# Patient Record
Sex: FEMALE | Race: WHITE | Employment: OTHER | URBAN - NONMETROPOLITAN AREA
[De-identification: names, ages, dates, MRNs, and addresses within clinical notes are randomized per-mention and may not be internally consistent; named-entity substitution may affect disease eponyms.]

---

## 2017-01-03 ENCOUNTER — PATIENT MESSAGE (OUTPATIENT)
Dept: FAMILY MEDICINE CLINIC | Age: 67
End: 2017-01-03

## 2017-01-03 RX ORDER — CLINDAMYCIN HYDROCHLORIDE 300 MG/1
300 CAPSULE ORAL 3 TIMES DAILY
Qty: 30 CAPSULE | Refills: 0 | Status: SHIPPED | OUTPATIENT
Start: 2017-01-03 | End: 2017-01-03 | Stop reason: SDUPTHER

## 2017-01-03 RX ORDER — CLINDAMYCIN HYDROCHLORIDE 300 MG/1
300 CAPSULE ORAL 3 TIMES DAILY
Qty: 30 CAPSULE | Refills: 0 | Status: SHIPPED | OUTPATIENT
Start: 2017-01-03 | End: 2017-01-13

## 2017-01-17 RX ORDER — POTASSIUM CHLORIDE 20 MEQ/1
TABLET, EXTENDED RELEASE ORAL
Qty: 540 TABLET | Refills: 1 | Status: SHIPPED | OUTPATIENT
Start: 2017-01-17 | End: 2017-08-25 | Stop reason: SDUPTHER

## 2017-01-23 RX ORDER — TRIAMTERENE AND HYDROCHLOROTHIAZIDE 37.5; 25 MG/1; MG/1
1 CAPSULE ORAL EVERY MORNING
Qty: 90 CAPSULE | Refills: 1 | Status: SHIPPED | OUTPATIENT
Start: 2017-01-23 | End: 2017-08-25 | Stop reason: SDUPTHER

## 2017-02-07 ENCOUNTER — PATIENT MESSAGE (OUTPATIENT)
Dept: FAMILY MEDICINE CLINIC | Age: 67
End: 2017-02-07

## 2017-02-07 DIAGNOSIS — M54.6 ACUTE THORACIC BACK PAIN, UNSPECIFIED BACK PAIN LATERALITY: ICD-10-CM

## 2017-02-07 DIAGNOSIS — M54.5 ACUTE LOW BACK PAIN, UNSPECIFIED BACK PAIN LATERALITY, WITH SCIATICA PRESENCE UNSPECIFIED: Primary | ICD-10-CM

## 2017-02-10 RX ORDER — ALENDRONATE SODIUM 70 MG/1
TABLET ORAL
Qty: 12 TABLET | Refills: 1 | Status: SHIPPED | OUTPATIENT
Start: 2017-02-10 | End: 2017-05-30

## 2017-03-30 ENCOUNTER — ANTI-COAG VISIT (OUTPATIENT)
Dept: FAMILY MEDICINE CLINIC | Age: 67
End: 2017-03-30

## 2017-03-30 DIAGNOSIS — I48.0 PAROXYSMAL ATRIAL FIBRILLATION (HCC): Primary | ICD-10-CM

## 2017-03-30 DIAGNOSIS — I48.20 CHRONIC ATRIAL FIBRILLATION (HCC): ICD-10-CM

## 2017-04-04 RX ORDER — PROPAFENONE HYDROCHLORIDE 150 MG/1
TABLET, FILM COATED ORAL
Qty: 90 TABLET | Refills: 1 | Status: SHIPPED | OUTPATIENT
Start: 2017-04-04 | End: 2018-06-04 | Stop reason: DRUGHIGH

## 2017-04-04 RX ORDER — ALPRAZOLAM 0.25 MG/1
0.25 TABLET ORAL EVERY 8 HOURS PRN
Qty: 270 TABLET | Refills: 0 | Status: SHIPPED | OUTPATIENT
Start: 2017-04-04 | End: 2017-08-25 | Stop reason: SDUPTHER

## 2017-04-04 RX ORDER — FUROSEMIDE 20 MG/1
20 TABLET ORAL DAILY
Qty: 90 TABLET | Refills: 1 | Status: SHIPPED | OUTPATIENT
Start: 2017-04-04 | End: 2017-08-25 | Stop reason: SDUPTHER

## 2017-04-05 LAB
INR BLD: 3.2
PROTIME: NORMAL SECONDS

## 2017-04-18 ENCOUNTER — ANTI-COAG VISIT (OUTPATIENT)
Dept: FAMILY MEDICINE CLINIC | Age: 67
End: 2017-04-18

## 2017-05-25 ENCOUNTER — ANTI-COAG VISIT (OUTPATIENT)
Dept: FAMILY MEDICINE CLINIC | Age: 67
End: 2017-05-25

## 2017-05-30 ENCOUNTER — OFFICE VISIT (OUTPATIENT)
Dept: FAMILY MEDICINE CLINIC | Age: 67
End: 2017-05-30
Payer: MEDICARE

## 2017-05-30 VITALS
HEART RATE: 64 BPM | HEIGHT: 63 IN | BODY MASS INDEX: 20.38 KG/M2 | SYSTOLIC BLOOD PRESSURE: 120 MMHG | WEIGHT: 115 LBS | RESPIRATION RATE: 16 BRPM | DIASTOLIC BLOOD PRESSURE: 60 MMHG

## 2017-05-30 DIAGNOSIS — R73.01 IMPAIRED FASTING GLUCOSE: ICD-10-CM

## 2017-05-30 DIAGNOSIS — E87.8 HYPOCHLOREMIA: ICD-10-CM

## 2017-05-30 DIAGNOSIS — M54.6 CHRONIC MIDLINE THORACIC BACK PAIN: ICD-10-CM

## 2017-05-30 DIAGNOSIS — E78.2 MIXED HYPERLIPIDEMIA: Primary | ICD-10-CM

## 2017-05-30 DIAGNOSIS — I10 ESSENTIAL HYPERTENSION: ICD-10-CM

## 2017-05-30 DIAGNOSIS — E87.6 HYPOKALEMIA: ICD-10-CM

## 2017-05-30 DIAGNOSIS — G89.29 CHRONIC MIDLINE THORACIC BACK PAIN: ICD-10-CM

## 2017-05-30 DIAGNOSIS — Z12.31 ENCOUNTER FOR SCREENING MAMMOGRAM FOR BREAST CANCER: ICD-10-CM

## 2017-05-30 DIAGNOSIS — Z12.11 SCREENING FOR COLON CANCER: ICD-10-CM

## 2017-05-30 DIAGNOSIS — I48.0 PAROXYSMAL ATRIAL FIBRILLATION (HCC): ICD-10-CM

## 2017-05-30 DIAGNOSIS — E83.42 HYPOMAGNESEMIA: ICD-10-CM

## 2017-05-30 PROCEDURE — 1090F PRES/ABSN URINE INCON ASSESS: CPT | Performed by: FAMILY MEDICINE

## 2017-05-30 PROCEDURE — 3017F COLORECTAL CA SCREEN DOC REV: CPT | Performed by: FAMILY MEDICINE

## 2017-05-30 PROCEDURE — 1123F ACP DISCUSS/DSCN MKR DOCD: CPT | Performed by: FAMILY MEDICINE

## 2017-05-30 PROCEDURE — 1036F TOBACCO NON-USER: CPT | Performed by: FAMILY MEDICINE

## 2017-05-30 PROCEDURE — 4040F PNEUMOC VAC/ADMIN/RCVD: CPT | Performed by: FAMILY MEDICINE

## 2017-05-30 PROCEDURE — 3014F SCREEN MAMMO DOC REV: CPT | Performed by: FAMILY MEDICINE

## 2017-05-30 PROCEDURE — 99214 OFFICE O/P EST MOD 30 MIN: CPT | Performed by: FAMILY MEDICINE

## 2017-05-30 PROCEDURE — G8419 CALC BMI OUT NRM PARAM NOF/U: HCPCS | Performed by: FAMILY MEDICINE

## 2017-05-30 PROCEDURE — G8427 DOCREV CUR MEDS BY ELIG CLIN: HCPCS | Performed by: FAMILY MEDICINE

## 2017-05-30 PROCEDURE — G8399 PT W/DXA RESULTS DOCUMENT: HCPCS | Performed by: FAMILY MEDICINE

## 2017-05-30 RX ORDER — ACETAMINOPHEN AND CODEINE PHOSPHATE 300; 15 MG/1; MG/1
1 TABLET ORAL EVERY 6 HOURS PRN
Qty: 30 TABLET | Refills: 0 | Status: SHIPPED | OUTPATIENT
Start: 2017-05-30 | End: 2017-12-01

## 2017-05-30 RX ORDER — DILTIAZEM HYDROCHLORIDE 240 MG/1
240 CAPSULE, COATED, EXTENDED RELEASE ORAL 2 TIMES DAILY
COMMUNITY
End: 2017-12-01 | Stop reason: SDUPTHER

## 2017-05-30 ASSESSMENT — ENCOUNTER SYMPTOMS
EYE DISCHARGE: 0
DIARRHEA: 0
VOMITING: 0
NAUSEA: 0
SINUS PRESSURE: 0
SORE THROAT: 0
SHORTNESS OF BREATH: 0
COUGH: 0
ABDOMINAL PAIN: 0
RHINORRHEA: 0
WHEEZING: 0
EYE REDNESS: 0
TROUBLE SWALLOWING: 0
CONSTIPATION: 0
BACK PAIN: 1

## 2017-05-30 ASSESSMENT — PATIENT HEALTH QUESTIONNAIRE - PHQ9
2. FEELING DOWN, DEPRESSED OR HOPELESS: 1
SUM OF ALL RESPONSES TO PHQ9 QUESTIONS 1 & 2: 1
1. LITTLE INTEREST OR PLEASURE IN DOING THINGS: 0
SUM OF ALL RESPONSES TO PHQ QUESTIONS 1-9: 1

## 2017-06-15 RX ORDER — DILTIAZEM HYDROCHLORIDE 60 MG/1
TABLET, FILM COATED ORAL
Qty: 270 TABLET | Refills: 1 | Status: SHIPPED | OUTPATIENT
Start: 2017-06-15 | End: 2018-07-17 | Stop reason: SDUPTHER

## 2017-07-20 LAB
INR BLD: NORMAL
PROTIME: 3 SECONDS

## 2017-07-27 LAB
INR BLD: 2.9
PROTIME: NORMAL SECONDS

## 2017-08-02 ENCOUNTER — TELEPHONE (OUTPATIENT)
Dept: PRIMARY CARE CLINIC | Age: 67
End: 2017-08-02

## 2017-08-25 RX ORDER — POTASSIUM CHLORIDE 20 MEQ/1
TABLET, EXTENDED RELEASE ORAL
Qty: 540 TABLET | Refills: 1 | Status: SHIPPED | OUTPATIENT
Start: 2017-08-25 | End: 2017-12-01 | Stop reason: SDUPTHER

## 2017-08-25 RX ORDER — WARFARIN SODIUM 1 MG/1
TABLET ORAL
Qty: 270 TABLET | Refills: 3 | Status: SHIPPED | OUTPATIENT
Start: 2017-08-25 | End: 2017-12-01 | Stop reason: SDUPTHER

## 2017-08-25 RX ORDER — ALPRAZOLAM 0.25 MG/1
0.25 TABLET ORAL EVERY 8 HOURS PRN
Qty: 270 TABLET | Refills: 0 | Status: SHIPPED | OUTPATIENT
Start: 2017-08-25 | End: 2017-12-01 | Stop reason: SDUPTHER

## 2017-08-25 RX ORDER — TRIAMTERENE AND HYDROCHLOROTHIAZIDE 37.5; 25 MG/1; MG/1
CAPSULE ORAL
Qty: 90 CAPSULE | Refills: 1 | Status: SHIPPED | OUTPATIENT
Start: 2017-08-25 | End: 2017-12-01 | Stop reason: SDUPTHER

## 2017-08-25 RX ORDER — FUROSEMIDE 20 MG/1
TABLET ORAL
Qty: 90 TABLET | Refills: 1 | Status: SHIPPED | OUTPATIENT
Start: 2017-08-25 | End: 2017-12-01 | Stop reason: SDUPTHER

## 2017-08-26 ENCOUNTER — OFFICE VISIT (OUTPATIENT)
Dept: PRIMARY CARE CLINIC | Age: 67
End: 2017-08-26
Payer: MEDICARE

## 2017-08-26 ENCOUNTER — HOSPITAL ENCOUNTER (OUTPATIENT)
Dept: GENERAL RADIOLOGY | Age: 67
Discharge: HOME | End: 2017-08-26
Payer: MEDICARE

## 2017-08-26 ENCOUNTER — HOSPITAL ENCOUNTER (OUTPATIENT)
Age: 67
Discharge: HOME | End: 2017-08-26
Payer: MEDICARE

## 2017-08-26 VITALS
OXYGEN SATURATION: 97 % | WEIGHT: 115.8 LBS | BODY MASS INDEX: 20.52 KG/M2 | HEART RATE: 65 BPM | DIASTOLIC BLOOD PRESSURE: 72 MMHG | RESPIRATION RATE: 14 BRPM | SYSTOLIC BLOOD PRESSURE: 134 MMHG | TEMPERATURE: 97 F | HEIGHT: 63 IN

## 2017-08-26 DIAGNOSIS — M79.671 RIGHT FOOT PAIN: Primary | ICD-10-CM

## 2017-08-26 DIAGNOSIS — M79.671 RIGHT FOOT PAIN: ICD-10-CM

## 2017-08-26 PROCEDURE — G8399 PT W/DXA RESULTS DOCUMENT: HCPCS | Performed by: FAMILY MEDICINE

## 2017-08-26 PROCEDURE — 1036F TOBACCO NON-USER: CPT | Performed by: FAMILY MEDICINE

## 2017-08-26 PROCEDURE — G8420 CALC BMI NORM PARAMETERS: HCPCS | Performed by: FAMILY MEDICINE

## 2017-08-26 PROCEDURE — 4040F PNEUMOC VAC/ADMIN/RCVD: CPT | Performed by: FAMILY MEDICINE

## 2017-08-26 PROCEDURE — G8427 DOCREV CUR MEDS BY ELIG CLIN: HCPCS | Performed by: FAMILY MEDICINE

## 2017-08-26 PROCEDURE — 99213 OFFICE O/P EST LOW 20 MIN: CPT | Performed by: FAMILY MEDICINE

## 2017-08-26 PROCEDURE — 73630 X-RAY EXAM OF FOOT: CPT

## 2017-08-26 PROCEDURE — 3017F COLORECTAL CA SCREEN DOC REV: CPT | Performed by: FAMILY MEDICINE

## 2017-08-26 PROCEDURE — 1123F ACP DISCUSS/DSCN MKR DOCD: CPT | Performed by: FAMILY MEDICINE

## 2017-08-26 PROCEDURE — 3014F SCREEN MAMMO DOC REV: CPT | Performed by: FAMILY MEDICINE

## 2017-08-26 PROCEDURE — 1090F PRES/ABSN URINE INCON ASSESS: CPT | Performed by: FAMILY MEDICINE

## 2017-08-26 ASSESSMENT — ENCOUNTER SYMPTOMS
RESPIRATORY NEGATIVE: 1
EYES NEGATIVE: 1
GASTROINTESTINAL NEGATIVE: 1
ALLERGIC/IMMUNOLOGIC NEGATIVE: 1

## 2017-08-31 LAB
INR BLD: 3
PROTIME: NORMAL SECONDS

## 2017-10-12 LAB
INR BLD: 3.5
PROTIME: NORMAL SECONDS

## 2017-10-19 ENCOUNTER — PATIENT MESSAGE (OUTPATIENT)
Dept: FAMILY MEDICINE CLINIC | Age: 67
End: 2017-10-19

## 2017-10-19 LAB
INR BLD: 3.2
PROTIME: NORMAL SECONDS

## 2017-10-20 ENCOUNTER — PATIENT MESSAGE (OUTPATIENT)
Dept: FAMILY MEDICINE CLINIC | Age: 67
End: 2017-10-20

## 2017-10-20 RX ORDER — VALACYCLOVIR HYDROCHLORIDE 1 G/1
TABLET, FILM COATED ORAL
Qty: 4 TABLET | Refills: 2 | Status: SHIPPED | OUTPATIENT
Start: 2017-10-20 | End: 2018-03-02 | Stop reason: ALTCHOICE

## 2017-10-20 NOTE — TELEPHONE ENCOUNTER
Per Epic medication history patient was previously on Valtrex 1 G 2 tab BID for 1 day, last fill 10/7/15. Please advise.

## 2017-11-02 LAB
INR BLD: NORMAL
PROTIME: 3.4 SECONDS

## 2017-11-17 ENCOUNTER — HOSPITAL ENCOUNTER (OUTPATIENT)
Dept: LAB | Age: 67
Setting detail: SPECIMEN
Discharge: HOME | End: 2017-11-17
Payer: MEDICARE

## 2017-11-17 DIAGNOSIS — E83.42 HYPOMAGNESEMIA: ICD-10-CM

## 2017-11-17 DIAGNOSIS — I10 ESSENTIAL HYPERTENSION: ICD-10-CM

## 2017-11-17 DIAGNOSIS — E78.2 MIXED HYPERLIPIDEMIA: ICD-10-CM

## 2017-11-17 DIAGNOSIS — R73.01 IMPAIRED FASTING GLUCOSE: ICD-10-CM

## 2017-11-17 LAB
ABSOLUTE EOS #: 0.2 K/UL (ref 0–0.4)
ABSOLUTE IMMATURE GRANULOCYTE: NORMAL K/UL (ref 0–0.3)
ABSOLUTE LYMPH #: 1.9 K/UL (ref 1–4.8)
ABSOLUTE MONO #: 0.7 K/UL (ref 0.1–1.2)
ALBUMIN SERPL-MCNC: 4.5 G/DL (ref 3.5–5.2)
ALBUMIN/GLOBULIN RATIO: 1.5 (ref 1–2.5)
ALP BLD-CCNC: 81 U/L (ref 35–104)
ALT SERPL-CCNC: 41 U/L (ref 5–33)
ANION GAP SERPL CALCULATED.3IONS-SCNC: 13 MMOL/L (ref 9–17)
AST SERPL-CCNC: 24 U/L
BASOPHILS # BLD: 1 % (ref 0–1)
BASOPHILS ABSOLUTE: 0.1 K/UL (ref 0–0.2)
BILIRUB SERPL-MCNC: 0.42 MG/DL (ref 0.3–1.2)
BUN BLDV-MCNC: 15 MG/DL (ref 8–23)
BUN/CREAT BLD: 20 (ref 9–20)
CALCIUM SERPL-MCNC: 9.5 MG/DL (ref 8.6–10.4)
CHLORIDE BLD-SCNC: 99 MMOL/L (ref 98–107)
CHOLESTEROL/HDL RATIO: 4.2
CHOLESTEROL: 216 MG/DL
CO2: 29 MMOL/L (ref 20–31)
CREAT SERPL-MCNC: 0.74 MG/DL (ref 0.5–0.9)
DIFFERENTIAL TYPE: NORMAL
EOSINOPHILS RELATIVE PERCENT: 3 % (ref 1–7)
GFR AFRICAN AMERICAN: >60 ML/MIN
GFR NON-AFRICAN AMERICAN: >60 ML/MIN
GFR SERPL CREATININE-BSD FRML MDRD: ABNORMAL ML/MIN/{1.73_M2}
GFR SERPL CREATININE-BSD FRML MDRD: ABNORMAL ML/MIN/{1.73_M2}
GLUCOSE BLD-MCNC: 97 MG/DL (ref 70–99)
HCT VFR BLD CALC: 38.7 % (ref 36–46)
HDLC SERPL-MCNC: 51 MG/DL
HEMOGLOBIN: 13 G/DL (ref 12–16)
IMMATURE GRANULOCYTES: NORMAL %
LDL CHOLESTEROL: 138 MG/DL (ref 0–130)
LYMPHOCYTES # BLD: 21 % (ref 16–46)
MAGNESIUM: 2.2 MG/DL (ref 1.6–2.6)
MCH RBC QN AUTO: 31.3 PG (ref 26–34)
MCHC RBC AUTO-ENTMCNC: 33.7 G/DL (ref 31–37)
MCV RBC AUTO: 92.9 FL (ref 80–100)
MONOCYTES # BLD: 8 % (ref 4–11)
PDW BLD-RTO: 12.1 % (ref 11–14.5)
PLATELET # BLD: 333 K/UL (ref 140–450)
PLATELET ESTIMATE: NORMAL
PMV BLD AUTO: 7.5 FL (ref 6–12)
POTASSIUM SERPL-SCNC: 4.6 MMOL/L (ref 3.7–5.3)
RBC # BLD: 4.16 M/UL (ref 4–5.2)
RBC # BLD: NORMAL 10*6/UL
SEG NEUTROPHILS: 67 % (ref 43–77)
SEGMENTED NEUTROPHILS ABSOLUTE COUNT: 6 K/UL (ref 1.8–7.7)
SODIUM BLD-SCNC: 141 MMOL/L (ref 135–144)
TOTAL PROTEIN: 7.6 G/DL (ref 6.4–8.3)
TRIGL SERPL-MCNC: 135 MG/DL
TSH SERPL DL<=0.05 MIU/L-ACNC: 2.47 MIU/L (ref 0.3–5)
VLDLC SERPL CALC-MCNC: ABNORMAL MG/DL (ref 1–30)
WBC # BLD: 8.9 K/UL (ref 3.5–11)
WBC # BLD: NORMAL 10*3/UL

## 2017-11-17 PROCEDURE — 83735 ASSAY OF MAGNESIUM: CPT

## 2017-11-17 PROCEDURE — 80061 LIPID PANEL: CPT

## 2017-11-17 PROCEDURE — 80053 COMPREHEN METABOLIC PANEL: CPT

## 2017-11-17 PROCEDURE — 84443 ASSAY THYROID STIM HORMONE: CPT

## 2017-11-17 PROCEDURE — 36415 COLL VENOUS BLD VENIPUNCTURE: CPT

## 2017-11-17 PROCEDURE — 85025 COMPLETE CBC W/AUTO DIFF WBC: CPT

## 2017-11-24 LAB
INR BLD: NORMAL
PROTIME: 3.5 SECONDS

## 2017-12-01 ENCOUNTER — OFFICE VISIT (OUTPATIENT)
Dept: FAMILY MEDICINE CLINIC | Age: 67
End: 2017-12-01
Payer: MEDICARE

## 2017-12-01 VITALS
WEIGHT: 120 LBS | BODY MASS INDEX: 21.26 KG/M2 | RESPIRATION RATE: 16 BRPM | HEART RATE: 64 BPM | DIASTOLIC BLOOD PRESSURE: 70 MMHG | HEIGHT: 63 IN | SYSTOLIC BLOOD PRESSURE: 120 MMHG | OXYGEN SATURATION: 97 %

## 2017-12-01 DIAGNOSIS — I10 ESSENTIAL HYPERTENSION: ICD-10-CM

## 2017-12-01 DIAGNOSIS — E78.2 MIXED HYPERLIPIDEMIA: ICD-10-CM

## 2017-12-01 DIAGNOSIS — R79.89 ABNORMAL LFTS: ICD-10-CM

## 2017-12-01 DIAGNOSIS — Z00.00 MEDICARE ANNUAL WELLNESS VISIT, INITIAL: ICD-10-CM

## 2017-12-01 DIAGNOSIS — E87.6 HYPOKALEMIA: ICD-10-CM

## 2017-12-01 DIAGNOSIS — I48.0 PAROXYSMAL ATRIAL FIBRILLATION (HCC): ICD-10-CM

## 2017-12-01 DIAGNOSIS — R73.01 IMPAIRED FASTING GLUCOSE: Primary | ICD-10-CM

## 2017-12-01 DIAGNOSIS — E83.42 HYPOMAGNESEMIA: ICD-10-CM

## 2017-12-01 PROCEDURE — 99214 OFFICE O/P EST MOD 30 MIN: CPT | Performed by: FAMILY MEDICINE

## 2017-12-01 PROCEDURE — 1036F TOBACCO NON-USER: CPT | Performed by: FAMILY MEDICINE

## 2017-12-01 PROCEDURE — 1090F PRES/ABSN URINE INCON ASSESS: CPT | Performed by: FAMILY MEDICINE

## 2017-12-01 PROCEDURE — 3014F SCREEN MAMMO DOC REV: CPT | Performed by: FAMILY MEDICINE

## 2017-12-01 PROCEDURE — G8484 FLU IMMUNIZE NO ADMIN: HCPCS | Performed by: FAMILY MEDICINE

## 2017-12-01 PROCEDURE — 4040F PNEUMOC VAC/ADMIN/RCVD: CPT | Performed by: FAMILY MEDICINE

## 2017-12-01 PROCEDURE — G0438 PPPS, INITIAL VISIT: HCPCS | Performed by: FAMILY MEDICINE

## 2017-12-01 PROCEDURE — G8399 PT W/DXA RESULTS DOCUMENT: HCPCS | Performed by: FAMILY MEDICINE

## 2017-12-01 PROCEDURE — G8427 DOCREV CUR MEDS BY ELIG CLIN: HCPCS | Performed by: FAMILY MEDICINE

## 2017-12-01 PROCEDURE — 1123F ACP DISCUSS/DSCN MKR DOCD: CPT | Performed by: FAMILY MEDICINE

## 2017-12-01 PROCEDURE — 3017F COLORECTAL CA SCREEN DOC REV: CPT | Performed by: FAMILY MEDICINE

## 2017-12-01 PROCEDURE — G8420 CALC BMI NORM PARAMETERS: HCPCS | Performed by: FAMILY MEDICINE

## 2017-12-01 RX ORDER — DILTIAZEM HYDROCHLORIDE 240 MG/1
240 CAPSULE, COATED, EXTENDED RELEASE ORAL 2 TIMES DAILY
Qty: 180 CAPSULE | Refills: 1 | Status: SHIPPED | OUTPATIENT
Start: 2017-12-01 | End: 2018-04-25 | Stop reason: DRUGHIGH

## 2017-12-01 RX ORDER — WARFARIN SODIUM 5 MG/1
TABLET ORAL
Qty: 100 TABLET | Refills: 3 | Status: SHIPPED | OUTPATIENT
Start: 2017-12-01 | End: 2018-11-18 | Stop reason: SDUPTHER

## 2017-12-01 RX ORDER — FUROSEMIDE 20 MG/1
TABLET ORAL
Qty: 90 TABLET | Refills: 1 | Status: SHIPPED | OUTPATIENT
Start: 2017-12-01 | End: 2018-12-06 | Stop reason: ALTCHOICE

## 2017-12-01 RX ORDER — TRIAMTERENE AND HYDROCHLOROTHIAZIDE 37.5; 25 MG/1; MG/1
CAPSULE ORAL
Qty: 90 CAPSULE | Refills: 1 | Status: SHIPPED | OUTPATIENT
Start: 2017-12-01 | End: 2018-01-18

## 2017-12-01 RX ORDER — POTASSIUM CHLORIDE 20 MEQ/1
TABLET, EXTENDED RELEASE ORAL
Qty: 540 TABLET | Refills: 1 | Status: SHIPPED | OUTPATIENT
Start: 2017-12-01 | End: 2018-12-06 | Stop reason: ALTCHOICE

## 2017-12-01 RX ORDER — WARFARIN SODIUM 1 MG/1
TABLET ORAL
Qty: 270 TABLET | Refills: 3 | Status: SHIPPED | OUTPATIENT
Start: 2017-12-01 | End: 2019-03-28 | Stop reason: SDUPTHER

## 2017-12-01 RX ORDER — METRONIDAZOLE 10 MG/G
GEL TOPICAL
Refills: 2 | COMMUNITY
Start: 2017-10-03 | End: 2018-12-06

## 2017-12-01 RX ORDER — ALPRAZOLAM 0.25 MG/1
0.25 TABLET ORAL EVERY 8 HOURS PRN
Qty: 270 TABLET | Refills: 0 | Status: SHIPPED | OUTPATIENT
Start: 2017-12-01 | End: 2018-03-15 | Stop reason: SDUPTHER

## 2017-12-01 ASSESSMENT — ENCOUNTER SYMPTOMS
TROUBLE SWALLOWING: 0
EYE REDNESS: 0
NAUSEA: 0
RHINORRHEA: 0
COUGH: 0
VOMITING: 0
SHORTNESS OF BREATH: 0
SINUS PRESSURE: 0
EYE DISCHARGE: 0
WHEEZING: 0
ABDOMINAL PAIN: 0
DIARRHEA: 0
SORE THROAT: 0
CONSTIPATION: 0

## 2017-12-01 ASSESSMENT — PATIENT HEALTH QUESTIONNAIRE - PHQ9: SUM OF ALL RESPONSES TO PHQ QUESTIONS 1-9: 0

## 2017-12-01 ASSESSMENT — ANXIETY QUESTIONNAIRES: GAD7 TOTAL SCORE: 5

## 2017-12-01 NOTE — PROGRESS NOTES
 aspirin 81 MG tablet Take 81 mg by mouth daily      warfarin (COUMADIN) 2 MG tablet 1 po daily as directed 90 tablet 3    magnesium oxide (MAG-OX) 400 MG tablet Take 400 mg by mouth 3 times daily. Past Medical History:   Diagnosis Date    Allergic rhinitis     Anxiety     Atrial fibrillation ([address]     Heart murmur     Valvuloplasty 6/2012    Histoplasmosis     By xray criteria.  History of cardioversion 10 times total: last Dec 2012    Hyperlipidemia     Hypertension     Hypokalemia     Hyponatremia     Impaired fasting glucose     Mass     Benign of neck/chin, status postop,now resolved. 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 Cleatrice Argue    Dr. Sangeetha Mari     Past Surgical History:   Procedure Laterality Date    CARDIAC VALVE SURGERY  June 19,2012    For rheumatic mitral stenosis.  CARDIOVERSION  10 times in past, last 12/12    CYST REMOVAL      Neck    DILATION AND CURETTAGE OF UTERUS  April 13, 1978    Dr. Jose L Beck OTHER SURGICAL HISTORY  Jan. 2013    Cardiac ablation    SALPINGECTOMY Bilateral July 8,1977    Laparoscopic partial(elective sterilization. )The mass was an epidermal inclusion cyst that was ruptured/inflamed.  TUBAL LIGATION      VENTRICULAR ABLATION SURGERY  1/8/2015    cardiac ablation; [facility]; for SVT    WRIST GANGLION EXCISION Right April 24,2002    Rafa     Family History   Problem Relation Age of Onset    Other Mother      Total abdominal hysterectomy.     Kidney Disease Mother     Hypertension Mother     Hypertension Father     Other Father      Prostate disease    Heart Defect Sister      Murmur    Allergies Daughter     Allergies Daughter     Hypertension Brother     Hypertension Sister     Cancer Father     High Blood Pressure Mother     High Blood Pressure Father     High Blood Pressure Brother     High Blood Pressure Sister     High Cholesterol Mother      History   Smoking Status    Former Smoker    Packs/day: 0.25    Years: 20.00    Types: Cigarettes    Quit date: 5/15/2009   Smokeless Tobacco    Never Used     History   Alcohol Use No       HRA is completed and reviewed today. See scanned HRA for review. Consultants:   Patient Care Team:  Madhavi Clemens DO as PCP - General (Family Medicine)  Dr. Echeverria , Cardiology (Valve specialist)  Dr. Matt Jaime, Electrophysiologist  Dermatology [address] [place] [address],     Fall Risk Assessment  Not at risk for falls. Hearing Assessment normal to conversation  Functional Assessment Patient is independent with mobility/ambulation, transfers, ADL's, IADL's. Cognitive Assessment Orientation to: oriented to person, place, and time, remote and recent memory is intact. No significant cognitive deficits are noted. There is no change in cognitive status in the last year duration. Wt Readings from Last 3 Encounters:   12/01/17 120 lb (54.4 kg)   08/26/17 115 lb 12.8 oz (52.5 kg)   05/30/17 115 lb (52.2 kg)     BP Readings from Last 3 Encounters:   12/01/17 120/70   08/26/17 134/72   05/30/17 120/60         Current Health Maintenance Status  Health Maintenance   Topic Date Due    Hepatitis C screen  Recommended, patient declined      DTaP/Tdap/Td vaccine (1 - Tdap) Recommended, patient declined      Pneumococcal low/med risk (1 of 2 - PCV13) Recommended, patient declined      Flu vaccine (1) Recommended, patient declined      Breast cancer screen  Recommended, patient declined      Colon cancer screen fecal DNA test (Cologuard)  08/11/2020    Lipid screen  11/17/2022    Zostavax vaccine  Recommended, patient declined      DEXA (modify frequency per FRAX score)  Addressed         Personalized Preventive Plan   This plan is provided to the patient in written form. Review of Systems   Constitutional: Negative for chills, fatigue and fever.    HENT: Negative Encounter Diagnoses   Name Primary?  Impaired fasting glucose Yes    Mixed hyperlipidemia     Essential hypertension     Paroxysmal atrial fibrillation (HCC)     Hypokalemia     Hypomagnesemia     Abnormal LFTs     Medicare annual wellness visit, initial            Plan:      Orders Placed This Encounter   Medications    warfarin (COUMADIN) 5 MG tablet     Si po daily or as directed     Dispense:  100 tablet     Refill:  3    diltiazem (CARTIA XT) 240 MG extended release capsule     Sig: Take 1 capsule by mouth 2 times daily     Dispense:  180 capsule     Refill:  1    ALPRAZolam (XANAX) 0.25 MG tablet     Sig: Take 1 tablet by mouth every 8 hours as needed for Anxiety . Dispense:  270 tablet     Refill:  0    triamterene-hydrochlorothiazide (DYAZIDE) 37.5-25 MG per capsule     Sig: TAKE 1 CAPSULE EVERY MORNING     Dispense:  90 capsule     Refill:  1    furosemide (LASIX) 20 MG tablet     Sig: TAKE 1 TABLET EVERY DAY     Dispense:  90 tablet     Refill:  1    potassium chloride (KLOR-CON M) 20 MEQ extended release tablet     Sig: TAKE 3 TABLETS EVERY MORNING AND 3 TABLETS EVERY EVENING     Dispense:  540 tablet     Refill:  1    warfarin (COUMADIN) 1 MG tablet     Sig: TAKE 2 TO 3 TABLETS EVERY DAY AS DIRECTED  BY  INR     Dispense:  270 tablet     Refill:  3     Orders Placed This Encounter   Procedures    Comprehensive Metabolic Panel     To be done in 3 months     Standing Status:   Future     Standing Expiration Date:   2018    Magnesium     Standing Status:   Future     Standing Expiration Date:   2018    [place] PPPS, INITIAL VISIT     Preventative measures are reviewed as noted above. Patient is to continue on her current medical regimen. No changes are made. Patient is to follow a low fat/high fiber diet and increase exercise for continued improved cholesterol control.     Patient is to have repeat labs in 3 months to check her LFTs and to make sure that her

## 2017-12-01 NOTE — PROGRESS NOTES
Patient declines hep c screening; flu, pneumonia, and tdap vaccines; mammogram and pap screening at this time.

## 2017-12-07 LAB
INR BLD: NORMAL
PROTIME: 2.5 SECONDS

## 2017-12-14 ENCOUNTER — TELEPHONE (OUTPATIENT)
Dept: FAMILY MEDICINE CLINIC | Age: 67
End: 2017-12-14

## 2017-12-14 LAB
INR BLD: NORMAL
PROTIME: 1.6 SECONDS

## 2017-12-21 ENCOUNTER — TELEPHONE (OUTPATIENT)
Dept: FAMILY MEDICINE CLINIC | Age: 67
End: 2017-12-21

## 2017-12-26 ENCOUNTER — TELEPHONE (OUTPATIENT)
Dept: FAMILY MEDICINE CLINIC | Age: 67
End: 2017-12-26

## 2017-12-26 NOTE — TELEPHONE ENCOUNTER
Called patient to see if I could schedule mammogram that was ordered for her back on 5/30/2017 but patient declined. States she just had a pacemaker implanted and does not want to persue a mammogram anytime soon. She will call us back if/when she wants to schedule.

## 2018-01-18 ENCOUNTER — OFFICE VISIT (OUTPATIENT)
Dept: FAMILY MEDICINE CLINIC | Age: 68
End: 2018-01-18
Payer: MEDICARE

## 2018-01-18 VITALS
SYSTOLIC BLOOD PRESSURE: 136 MMHG | HEART RATE: 64 BPM | BODY MASS INDEX: 21.62 KG/M2 | WEIGHT: 122 LBS | DIASTOLIC BLOOD PRESSURE: 80 MMHG | RESPIRATION RATE: 16 BRPM | HEIGHT: 63 IN

## 2018-01-18 DIAGNOSIS — R59.0 LYMPHADENOPATHY, AXILLARY: Primary | ICD-10-CM

## 2018-01-18 DIAGNOSIS — N64.89 OTHER SPECIFIED DISORDERS OF BREAST (CODE): ICD-10-CM

## 2018-01-18 PROCEDURE — G8420 CALC BMI NORM PARAMETERS: HCPCS | Performed by: FAMILY MEDICINE

## 2018-01-18 PROCEDURE — 3014F SCREEN MAMMO DOC REV: CPT | Performed by: FAMILY MEDICINE

## 2018-01-18 PROCEDURE — 3017F COLORECTAL CA SCREEN DOC REV: CPT | Performed by: FAMILY MEDICINE

## 2018-01-18 PROCEDURE — 4040F PNEUMOC VAC/ADMIN/RCVD: CPT | Performed by: FAMILY MEDICINE

## 2018-01-18 PROCEDURE — 1036F TOBACCO NON-USER: CPT | Performed by: FAMILY MEDICINE

## 2018-01-18 PROCEDURE — G8399 PT W/DXA RESULTS DOCUMENT: HCPCS | Performed by: FAMILY MEDICINE

## 2018-01-18 PROCEDURE — 1090F PRES/ABSN URINE INCON ASSESS: CPT | Performed by: FAMILY MEDICINE

## 2018-01-18 PROCEDURE — G8484 FLU IMMUNIZE NO ADMIN: HCPCS | Performed by: FAMILY MEDICINE

## 2018-01-18 PROCEDURE — 1123F ACP DISCUSS/DSCN MKR DOCD: CPT | Performed by: FAMILY MEDICINE

## 2018-01-18 PROCEDURE — 99214 OFFICE O/P EST MOD 30 MIN: CPT | Performed by: FAMILY MEDICINE

## 2018-01-18 PROCEDURE — G8427 DOCREV CUR MEDS BY ELIG CLIN: HCPCS | Performed by: FAMILY MEDICINE

## 2018-01-18 RX ORDER — CEPHALEXIN 500 MG/1
500 CAPSULE ORAL 3 TIMES DAILY
Qty: 30 CAPSULE | Refills: 0 | Status: SHIPPED | OUTPATIENT
Start: 2018-01-18 | End: 2018-03-02 | Stop reason: ALTCHOICE

## 2018-01-18 ASSESSMENT — ENCOUNTER SYMPTOMS
GASTROINTESTINAL NEGATIVE: 1
RESPIRATORY NEGATIVE: 1
EYES NEGATIVE: 1
BACK PAIN: 0

## 2018-01-18 NOTE — PROGRESS NOTES
Subjective:      Patient ID: Sam Bowman is a 78 y.o. female. Other   This is a new problem. The current episode started 1 to 4 weeks ago (had pacemaker place on 12/20/17 and post op had palpable lymph nodes to the left axilla). The problem occurs constantly. The problem has been gradually worsening (perhaps the largest lymph node has gotten larger). Associated symptoms include chest pain (some discomfort around the incision site), myalgias and neck pain (has had some tension to the neck). Pertinent negatives include no arthralgias, chills, fatigue, fever or joint swelling. Associated symptoms comments: Lymph node developed after surgery. . Treatments tried: was on clindamycin after surgery, but didn't notice any change in her symptoms. Did review patient's med list, allergies, social history,pmhx and pshx today as noted in the record. Review of Systems   Constitutional: Negative for chills, fatigue and fever. HENT: Negative. Eyes: Negative. Respiratory: Negative. Cardiovascular: Positive for chest pain (some discomfort around the incision site). Gastrointestinal: Negative. Musculoskeletal: Positive for myalgias and neck pain (has had some tension to the neck). Negative for arthralgias, back pain, gait problem, joint swelling and neck stiffness. Skin: Negative. Hematological: Positive for adenopathy. Objective:   Physical Exam   Constitutional: She is oriented to person, place, and time. She appears well-developed and well-nourished. No distress. HENT:   Head: Normocephalic and atraumatic. Eyes: Conjunctivae are normal.   Neck: Normal range of motion. Pulmonary/Chest: Effort normal.   Slight pink coloration to the skin surrounding the incision site of the pacemaker. Wound appears appropriately healed. Skin is warm to the touch in comparison to the rest of the chest wall. No drainage noted. Lymphadenopathy:     She has no cervical adenopathy.      She has axillary

## 2018-01-19 ENCOUNTER — HOSPITAL ENCOUNTER (OUTPATIENT)
Dept: ULTRASOUND IMAGING | Age: 68
Discharge: HOME | End: 2018-01-19
Payer: MEDICARE

## 2018-01-19 DIAGNOSIS — R59.0 LYMPHADENOPATHY, AXILLARY: ICD-10-CM

## 2018-01-19 DIAGNOSIS — N64.89 OTHER SPECIFIED DISORDERS OF BREAST (CODE): ICD-10-CM

## 2018-01-19 PROCEDURE — 76642 ULTRASOUND BREAST LIMITED: CPT

## 2018-01-23 ENCOUNTER — PATIENT MESSAGE (OUTPATIENT)
Dept: FAMILY MEDICINE CLINIC | Age: 68
End: 2018-01-23

## 2018-02-01 LAB
INR BLD: NORMAL
PROTIME: 2.7 SECONDS

## 2018-02-08 LAB
INR BLD: NORMAL
PROTIME: 2.9 SECONDS

## 2018-02-15 LAB
INR BLD: NORMAL
PROTIME: 2.7 SECONDS

## 2018-02-18 ENCOUNTER — APPOINTMENT (OUTPATIENT)
Dept: GENERAL RADIOLOGY | Age: 68
End: 2018-02-18
Payer: MEDICARE

## 2018-02-18 ENCOUNTER — HOSPITAL ENCOUNTER (EMERGENCY)
Age: 68
Discharge: HOME | End: 2018-02-18
Attending: EMERGENCY MEDICINE
Payer: MEDICARE

## 2018-02-18 VITALS
HEIGHT: 64 IN | SYSTOLIC BLOOD PRESSURE: 154 MMHG | BODY MASS INDEX: 20.14 KG/M2 | RESPIRATION RATE: 14 BRPM | WEIGHT: 118 LBS | DIASTOLIC BLOOD PRESSURE: 68 MMHG | HEART RATE: 72 BPM | OXYGEN SATURATION: 94 %

## 2018-02-18 DIAGNOSIS — I48.0 PAROXYSMAL ATRIAL FIBRILLATION (HCC): Primary | ICD-10-CM

## 2018-02-18 LAB
ABSOLUTE EOS #: 0.3 K/UL (ref 0–0.4)
ABSOLUTE IMMATURE GRANULOCYTE: NORMAL K/UL (ref 0–0.3)
ABSOLUTE LYMPH #: 3.2 K/UL (ref 1–4.8)
ABSOLUTE MONO #: 0.9 K/UL (ref 0.1–1.2)
ALBUMIN SERPL-MCNC: 4.6 G/DL (ref 3.5–5.2)
ALBUMIN/GLOBULIN RATIO: 1.5 (ref 1–2.5)
ALP BLD-CCNC: 65 U/L (ref 35–104)
ALT SERPL-CCNC: 18 U/L (ref 5–33)
ANION GAP SERPL CALCULATED.3IONS-SCNC: 17 MMOL/L (ref 9–17)
AST SERPL-CCNC: 21 U/L
BASOPHILS # BLD: 1 % (ref 0–1)
BASOPHILS ABSOLUTE: 0.1 K/UL (ref 0–0.2)
BILIRUB SERPL-MCNC: 0.11 MG/DL (ref 0.3–1.2)
BUN BLDV-MCNC: 29 MG/DL (ref 8–23)
BUN/CREAT BLD: 33 (ref 9–20)
CALCIUM SERPL-MCNC: 9.3 MG/DL (ref 8.6–10.4)
CHLORIDE BLD-SCNC: 96 MMOL/L (ref 98–107)
CO2: 25 MMOL/L (ref 20–31)
CREAT SERPL-MCNC: 0.87 MG/DL (ref 0.5–0.9)
DIFFERENTIAL TYPE: NORMAL
EOSINOPHILS RELATIVE PERCENT: 3 % (ref 1–7)
GFR AFRICAN AMERICAN: >60 ML/MIN
GFR NON-AFRICAN AMERICAN: >60 ML/MIN
GFR SERPL CREATININE-BSD FRML MDRD: ABNORMAL ML/MIN/{1.73_M2}
GFR SERPL CREATININE-BSD FRML MDRD: ABNORMAL ML/MIN/{1.73_M2}
GLUCOSE BLD-MCNC: 173 MG/DL (ref 70–99)
HCT VFR BLD CALC: 39.4 % (ref 36–46)
HEMOGLOBIN: 13.4 G/DL (ref 12–16)
IMMATURE GRANULOCYTES: NORMAL %
INR BLD: 2.3
LYMPHOCYTES # BLD: 34 % (ref 16–46)
MAGNESIUM: 2 MG/DL (ref 1.6–2.6)
MCH RBC QN AUTO: 31.8 PG (ref 26–34)
MCHC RBC AUTO-ENTMCNC: 34.1 G/DL (ref 31–37)
MCV RBC AUTO: 93.3 FL (ref 80–100)
MONOCYTES # BLD: 10 % (ref 4–11)
NRBC AUTOMATED: NORMAL PER 100 WBC
PARTIAL THROMBOPLASTIN TIME: 37.9 SEC (ref 27–35)
PDW BLD-RTO: 12.8 % (ref 11–14.5)
PLATELET # BLD: 303 K/UL (ref 140–450)
PLATELET ESTIMATE: NORMAL
PMV BLD AUTO: 7.7 FL (ref 6–12)
POTASSIUM SERPL-SCNC: 4.4 MMOL/L (ref 3.7–5.3)
PROTHROMBIN TIME: 24.8 SEC (ref 9.4–11.3)
RBC # BLD: 4.22 M/UL (ref 4–5.2)
RBC # BLD: NORMAL 10*6/UL
SEG NEUTROPHILS: 52 % (ref 43–77)
SEGMENTED NEUTROPHILS ABSOLUTE COUNT: 4.8 K/UL (ref 1.8–7.7)
SODIUM BLD-SCNC: 138 MMOL/L (ref 135–144)
TOTAL PROTEIN: 7.7 G/DL (ref 6.4–8.3)
TROPONIN INTERP: NORMAL
TROPONIN INTERP: NORMAL
TROPONIN T: <0.03 NG/ML
TROPONIN T: <0.03 NG/ML
WBC # BLD: 9.3 K/UL (ref 3.5–11)
WBC # BLD: NORMAL 10*3/UL

## 2018-02-18 PROCEDURE — 2580000003 HC RX 258: Performed by: EMERGENCY MEDICINE

## 2018-02-18 PROCEDURE — 85730 THROMBOPLASTIN TIME PARTIAL: CPT

## 2018-02-18 PROCEDURE — 36415 COLL VENOUS BLD VENIPUNCTURE: CPT

## 2018-02-18 PROCEDURE — 83735 ASSAY OF MAGNESIUM: CPT

## 2018-02-18 PROCEDURE — 84484 ASSAY OF TROPONIN QUANT: CPT

## 2018-02-18 PROCEDURE — 71046 X-RAY EXAM CHEST 2 VIEWS: CPT

## 2018-02-18 PROCEDURE — 85610 PROTHROMBIN TIME: CPT

## 2018-02-18 PROCEDURE — 93005 ELECTROCARDIOGRAM TRACING: CPT

## 2018-02-18 PROCEDURE — 80053 COMPREHEN METABOLIC PANEL: CPT

## 2018-02-18 PROCEDURE — 99285 EMERGENCY DEPT VISIT HI MDM: CPT

## 2018-02-18 PROCEDURE — 85025 COMPLETE CBC W/AUTO DIFF WBC: CPT

## 2018-02-18 RX ORDER — 0.9 % SODIUM CHLORIDE 0.9 %
1000 INTRAVENOUS SOLUTION INTRAVENOUS ONCE
Status: COMPLETED | OUTPATIENT
Start: 2018-02-18 | End: 2018-02-18

## 2018-02-18 RX ADMIN — SODIUM CHLORIDE 1000 ML: 9 INJECTION, SOLUTION INTRAVENOUS at 20:45

## 2018-02-18 ASSESSMENT — ENCOUNTER SYMPTOMS
EYE PAIN: 0
VOMITING: 0
ABDOMINAL PAIN: 0
RHINORRHEA: 0
BACK PAIN: 0
DIARRHEA: 0
SORE THROAT: 0
SHORTNESS OF BREATH: 0
COUGH: 0
NAUSEA: 0

## 2018-02-19 ENCOUNTER — PATIENT MESSAGE (OUTPATIENT)
Dept: FAMILY MEDICINE CLINIC | Age: 68
End: 2018-02-19

## 2018-02-19 LAB
EKG ATRIAL RATE: 67 BPM
EKG ATRIAL RATE: 91 BPM
EKG P AXIS: 68 DEGREES
EKG P AXIS: 70 DEGREES
EKG P-R INTERVAL: 184 MS
EKG P-R INTERVAL: 202 MS
EKG Q-T INTERVAL: 356 MS
EKG Q-T INTERVAL: 418 MS
EKG QRS DURATION: 80 MS
EKG QRS DURATION: 90 MS
EKG QTC CALCULATION (BAZETT): 437 MS
EKG QTC CALCULATION (BAZETT): 441 MS
EKG R AXIS: 11 DEGREES
EKG R AXIS: 13 DEGREES
EKG T AXIS: -167 DEGREES
EKG T AXIS: 138 DEGREES
EKG VENTRICULAR RATE: 67 BPM
EKG VENTRICULAR RATE: 91 BPM

## 2018-02-19 NOTE — ED PROVIDER NOTES
[facility] ED  [address]  Phone: [phone]    eMERGENCY dEPARTMENT eNCOUnter          Pt Name: iMller Mane  MRN: [id number]  [place] 1950  Date of evaluation: 2/18/2018      CHIEF COMPLAINT       Chief Complaint   Patient presents with    Tachycardia     started around 1800 this evening. Pt states she has been running in the 160's since 1800. HISTORY OF PRESENT ILLNESS              Miller Mane is a 78 y.o. female who presents with palpitations. Patient states this has been a chronic problem for her and is known to have atrial fibrillation. States she sometimes goes into a 2 fibrillation but usually is in sinus rhythm. She does have a pacemaker implanted somewhat recently because she had been having sinus pauses. She denies any chest pain or pressure. Denies any difficulty breathing. States she took her oral Cardizem and it began at 6 PM this evening and has not yet resolved. Patient went into sinus rhythm shortly after arrival and just before the initial ECG was obtained however we did see the atrial fibrillation on the cardiac monitor and her heart rate was initially in the 150s. She tells me she had a shorter episode this morning. The episode before that was February 7 and 14th. States he usually do not last very long and break after she takes her oral Cardizem. Denies any trauma. Has had ablations in the past for this. She denies other concerns. States her cardiologist [facility]. REVIEW OF SYSTEMS         Review of Systems   Constitutional: Negative for chills, fatigue and fever. HENT: Negative for congestion, rhinorrhea and sore throat. Eyes: Negative for pain. Respiratory: Negative for cough and shortness of breath. Cardiovascular: Positive for palpitations. Negative for chest pain. Gastrointestinal: Negative for abdominal pain, diarrhea, nausea and vomiting.    Genitourinary: score is 15. Skin: Skin is warm and dry. She is not diaphoretic. DIFFERENTIAL DIAGNOSIS/ MDM:     Plan of this week to initiate a cardiac workup. Clinically she is nontoxic-appearing. She has broken out of the nature of fibrillation on her own. Denies any symptoms at this time. DIAGNOSTIC RESULTS     EKG: All EKG's are interpreted by the Emergency Department Physician who either signs or Co-signs this chart in the absence of a cardiologist.    Interpreted by Erwin Ricketts DO     Rhythm: normal sinus   Rate: normal  Axis: normal  Ectopy: none  Conduction: Sinus  ST Segments: nonspecific findings  T Waves: nonspecific findings    Clinical Impression: normal sinus rhythm with no acute changes. Nonspecific EKG. No acute infarction/ischemia noted. No obvious acute changes from prior ECGs in our system. RADIOLOGY:   I directly visualized the following  images and reviewed the radiologist interpretations:  XR CHEST STANDARD (2 VW)   Final Result   No acute cardiopulmonary process. Xr Chest Standard (2 Vw)    Result Date: 2/18/2018  EXAMINATION: TWO VIEWS OF THE CHEST 2/18/2018 COMPARISON: Chest 02/11/2011 HISTORY: ORDERING SYSTEM PROVIDED HISTORY: tachycardia TECHNOLOGIST PROVIDED HISTORY: Reason for exam:->tachycardia Ordering Physician Provided Reason for Exam: Tachycardia, hx of pacemaker, cardiac ablation and balloon insertion Acuity: Acute Type of Exam: Initial Relevant Medical/Surgical History: hx of pacemaker, cardiac ablation and balloon insertion FINDINGS: New left internal jugular dual chamber pacemaker with intact leads. Numerous calcified granulomas in both lungs. Otherwise clear lungs. No findings of pneumothorax or pleural effusion. Normal mediastinal and cardiac contours. Mildly prominent hilar contours. Atherosclerotic calcification in the aorta.   Diffuse osseous demineralization consistent with osteoporosis given chronic compression deformities near the

## 2018-03-01 LAB
INR BLD: NORMAL
PROTIME: 2.5 SECONDS

## 2018-03-02 ENCOUNTER — OFFICE VISIT (OUTPATIENT)
Dept: PRIMARY CARE CLINIC | Age: 68
End: 2018-03-02
Payer: MEDICARE

## 2018-03-02 ENCOUNTER — HOSPITAL ENCOUNTER (OUTPATIENT)
Dept: GENERAL RADIOLOGY | Age: 68
Discharge: HOME | End: 2018-03-04
Payer: MEDICARE

## 2018-03-02 VITALS
DIASTOLIC BLOOD PRESSURE: 62 MMHG | TEMPERATURE: 99.6 F | HEART RATE: 74 BPM | SYSTOLIC BLOOD PRESSURE: 120 MMHG | HEIGHT: 63 IN | BODY MASS INDEX: 21.4 KG/M2 | WEIGHT: 120.8 LBS

## 2018-03-02 DIAGNOSIS — I10 ESSENTIAL HYPERTENSION: ICD-10-CM

## 2018-03-02 DIAGNOSIS — J20.9 ACUTE BRONCHITIS, UNSPECIFIED ORGANISM: Primary | ICD-10-CM

## 2018-03-02 DIAGNOSIS — J20.9 ACUTE BRONCHITIS, UNSPECIFIED ORGANISM: ICD-10-CM

## 2018-03-02 PROCEDURE — 1123F ACP DISCUSS/DSCN MKR DOCD: CPT | Performed by: FAMILY MEDICINE

## 2018-03-02 PROCEDURE — G8484 FLU IMMUNIZE NO ADMIN: HCPCS | Performed by: FAMILY MEDICINE

## 2018-03-02 PROCEDURE — 71046 X-RAY EXAM CHEST 2 VIEWS: CPT

## 2018-03-02 PROCEDURE — G8420 CALC BMI NORM PARAMETERS: HCPCS | Performed by: FAMILY MEDICINE

## 2018-03-02 PROCEDURE — 1090F PRES/ABSN URINE INCON ASSESS: CPT | Performed by: FAMILY MEDICINE

## 2018-03-02 PROCEDURE — 1036F TOBACCO NON-USER: CPT | Performed by: FAMILY MEDICINE

## 2018-03-02 PROCEDURE — 3014F SCREEN MAMMO DOC REV: CPT | Performed by: FAMILY MEDICINE

## 2018-03-02 PROCEDURE — 4040F PNEUMOC VAC/ADMIN/RCVD: CPT | Performed by: FAMILY MEDICINE

## 2018-03-02 PROCEDURE — 3017F COLORECTAL CA SCREEN DOC REV: CPT | Performed by: FAMILY MEDICINE

## 2018-03-02 PROCEDURE — 99213 OFFICE O/P EST LOW 20 MIN: CPT | Performed by: FAMILY MEDICINE

## 2018-03-02 PROCEDURE — G8399 PT W/DXA RESULTS DOCUMENT: HCPCS | Performed by: FAMILY MEDICINE

## 2018-03-02 PROCEDURE — G8427 DOCREV CUR MEDS BY ELIG CLIN: HCPCS | Performed by: FAMILY MEDICINE

## 2018-03-02 RX ORDER — LEVOFLOXACIN 500 MG/1
500 TABLET, FILM COATED ORAL DAILY
Qty: 7 TABLET | Refills: 0 | Status: SHIPPED | OUTPATIENT
Start: 2018-03-02 | End: 2018-03-12

## 2018-03-02 RX ORDER — BENZONATATE 100 MG/1
100 CAPSULE ORAL 3 TIMES DAILY PRN
Qty: 30 CAPSULE | Refills: 0 | Status: SHIPPED | OUTPATIENT
Start: 2018-03-02 | End: 2018-03-09

## 2018-03-04 ENCOUNTER — PATIENT MESSAGE (OUTPATIENT)
Dept: FAMILY MEDICINE CLINIC | Age: 68
End: 2018-03-04

## 2018-03-05 ENCOUNTER — TELEPHONE (OUTPATIENT)
Dept: FAMILY MEDICINE CLINIC | Age: 68
End: 2018-03-05

## 2018-03-05 NOTE — TELEPHONE ENCOUNTER
Pt calling stating she was seen in  on Friday and a chest xray was ordered, pt calling for results today, please advise at [phone]

## 2018-03-08 LAB
INR BLD: NORMAL
PROTIME: 3.5 SECONDS

## 2018-03-15 DIAGNOSIS — F41.9 ANXIETY: Primary | ICD-10-CM

## 2018-03-15 LAB
INR BLD: NORMAL
PROTIME: 3.5 SECONDS

## 2018-03-15 RX ORDER — ALPRAZOLAM 0.25 MG/1
0.25 TABLET ORAL EVERY 8 HOURS PRN
Qty: 270 TABLET | Refills: 0 | Status: SHIPPED | OUTPATIENT
Start: 2018-03-15 | End: 2018-06-04 | Stop reason: SDUPTHER

## 2018-03-15 NOTE — TELEPHONE ENCOUNTER
From: Fermin Mcclain  Sent: 3/14/2018 5:48 PM EDT  Subject: Medication Renewal Request    Fermin Mcclain would like a refill of the following medications:     ALPRAZolam Norman Miranda) 0.25 MG tablet GABRIEL Petty    [facility] [address] [facility] [phone] - F [phone]    Comment:

## 2018-03-15 NOTE — TELEPHONE ENCOUNTER
Roger Iraheta called requesting a refill of the below medication which has been pended for you: last filled 12/06/2017 #270    Requested Prescriptions     Pending Prescriptions Disp Refills    ALPRAZolam (XANAX) 0.25 MG tablet 270 tablet 0     Sig: Take 1 tablet by mouth every 8 hours as needed for Anxiety.        Last Appointment Date: 1/18/2018  Next Appointment Date: 6/4/2018    Allergies   Allergen Reactions    Amiodarone Shortness Of Breath     Authoring Clinician or Source System: Irl Furnish    Amoxicillin Anaphylaxis    Beta Adrenergic Blockers Shortness Of Breath    Metoprolol Shortness Of Breath     \"Beta Blockers    Penicillin V Potassium Anaphylaxis    Dronedarone Other (See Comments)     Irregular Heart Beats  Authoring Clinician or Source System: Nehal Schmitt  (Multaq)    Epinephrine Other (See Comments)     dental admin gave her afib    Lisinopril Other (See Comments)     Eyes were beating along with her heart beat    Morphine Nausea Only    Statins     Nexium [Esomeprazole Magnesium] Anxiety     Heart flutters more than normal.

## 2018-03-21 ENCOUNTER — HOSPITAL ENCOUNTER (OUTPATIENT)
Dept: LAB | Age: 68
Setting detail: SPECIMEN
Discharge: HOME | End: 2018-03-21
Payer: MEDICARE

## 2018-03-21 DIAGNOSIS — E83.42 HYPOMAGNESEMIA: ICD-10-CM

## 2018-03-21 DIAGNOSIS — E87.6 HYPOKALEMIA: ICD-10-CM

## 2018-03-21 DIAGNOSIS — R79.89 ABNORMAL LFTS: ICD-10-CM

## 2018-03-21 LAB
ALBUMIN SERPL-MCNC: 4.4 G/DL (ref 3.5–5.2)
ALBUMIN/GLOBULIN RATIO: 1.4 (ref 1–2.5)
ALP BLD-CCNC: 57 U/L (ref 35–104)
ALT SERPL-CCNC: 11 U/L (ref 5–33)
ANION GAP SERPL CALCULATED.3IONS-SCNC: 11 MMOL/L (ref 9–17)
AST SERPL-CCNC: 16 U/L
BILIRUB SERPL-MCNC: 0.34 MG/DL (ref 0.3–1.2)
BUN BLDV-MCNC: 17 MG/DL (ref 8–23)
BUN/CREAT BLD: 25 (ref 9–20)
CALCIUM SERPL-MCNC: 9.3 MG/DL (ref 8.6–10.4)
CHLORIDE BLD-SCNC: 100 MMOL/L (ref 98–107)
CO2: 29 MMOL/L (ref 20–31)
CREAT SERPL-MCNC: 0.68 MG/DL (ref 0.5–0.9)
GFR AFRICAN AMERICAN: >60 ML/MIN
GFR NON-AFRICAN AMERICAN: >60 ML/MIN
GFR SERPL CREATININE-BSD FRML MDRD: ABNORMAL ML/MIN/{1.73_M2}
GFR SERPL CREATININE-BSD FRML MDRD: ABNORMAL ML/MIN/{1.73_M2}
GLUCOSE BLD-MCNC: 92 MG/DL (ref 70–99)
MAGNESIUM: 2.2 MG/DL (ref 1.6–2.6)
POTASSIUM SERPL-SCNC: 5.1 MMOL/L (ref 3.7–5.3)
SODIUM BLD-SCNC: 140 MMOL/L (ref 135–144)
TOTAL PROTEIN: 7.5 G/DL (ref 6.4–8.3)

## 2018-03-21 PROCEDURE — 36415 COLL VENOUS BLD VENIPUNCTURE: CPT

## 2018-03-21 PROCEDURE — 80053 COMPREHEN METABOLIC PANEL: CPT

## 2018-03-21 PROCEDURE — 83735 ASSAY OF MAGNESIUM: CPT

## 2018-03-23 LAB
INR BLD: 2
PROTIME: NORMAL SECONDS

## 2018-04-05 LAB
INR BLD: 3.2
PROTIME: NORMAL SECONDS

## 2018-04-12 PROBLEM — Z00.00 MEDICARE ANNUAL WELLNESS VISIT, INITIAL: Status: RESOLVED | Noted: 2017-12-01 | Resolved: 2018-04-12

## 2018-04-25 ENCOUNTER — PATIENT MESSAGE (OUTPATIENT)
Dept: FAMILY MEDICINE CLINIC | Age: 68
End: 2018-04-25

## 2018-04-25 RX ORDER — DILTIAZEM HYDROCHLORIDE 240 MG/1
240 CAPSULE, COATED, EXTENDED RELEASE ORAL DAILY
Qty: 90 CAPSULE | Refills: 1 | Status: SHIPPED | OUTPATIENT
Start: 2018-04-25 | End: 2018-06-04 | Stop reason: DRUGHIGH

## 2018-04-25 RX ORDER — DILTIAZEM HYDROCHLORIDE 180 MG/1
180 CAPSULE, COATED, EXTENDED RELEASE ORAL DAILY
Qty: 90 CAPSULE | Refills: 1 | Status: SHIPPED | OUTPATIENT
Start: 2018-04-25 | End: 2018-06-04 | Stop reason: DRUGHIGH

## 2018-04-25 RX ORDER — CLINDAMYCIN HYDROCHLORIDE 300 MG/1
CAPSULE ORAL
Qty: 6 CAPSULE | Refills: 0 | Status: SHIPPED | OUTPATIENT
Start: 2018-04-25 | End: 2018-07-10 | Stop reason: SDUPTHER

## 2018-04-25 RX ORDER — DILTIAZEM HYDROCHLORIDE 240 MG/1
240 CAPSULE, COATED, EXTENDED RELEASE ORAL DAILY
COMMUNITY
End: 2018-04-25 | Stop reason: SDUPTHER

## 2018-04-25 RX ORDER — DILTIAZEM HYDROCHLORIDE 180 MG/1
180 CAPSULE, COATED, EXTENDED RELEASE ORAL DAILY
COMMUNITY
End: 2018-04-25 | Stop reason: SDUPTHER

## 2018-04-25 RX ORDER — DILTIAZEM HYDROCHLORIDE 240 MG/1
240 CAPSULE, COATED, EXTENDED RELEASE ORAL 2 TIMES DAILY
Qty: 180 CAPSULE | Refills: 1 | Status: CANCELLED | OUTPATIENT
Start: 2018-04-25

## 2018-05-10 LAB
INR BLD: NORMAL
PROTIME: 2.3 SECONDS

## 2018-05-17 LAB
INR BLD: NORMAL
PROTIME: 2.5 SECONDS

## 2018-05-31 LAB
INR BLD: NORMAL
PROTIME: 2.7 SECONDS

## 2018-06-04 ENCOUNTER — OFFICE VISIT (OUTPATIENT)
Dept: FAMILY MEDICINE CLINIC | Age: 68
End: 2018-06-04
Payer: MEDICARE

## 2018-06-04 VITALS
RESPIRATION RATE: 16 BRPM | SYSTOLIC BLOOD PRESSURE: 120 MMHG | BODY MASS INDEX: 22.82 KG/M2 | HEIGHT: 62 IN | HEART RATE: 88 BPM | DIASTOLIC BLOOD PRESSURE: 70 MMHG | WEIGHT: 124 LBS

## 2018-06-04 DIAGNOSIS — R73.01 IMPAIRED FASTING GLUCOSE: ICD-10-CM

## 2018-06-04 DIAGNOSIS — I48.0 PAROXYSMAL ATRIAL FIBRILLATION (HCC): Primary | ICD-10-CM

## 2018-06-04 DIAGNOSIS — I10 ESSENTIAL HYPERTENSION: ICD-10-CM

## 2018-06-04 DIAGNOSIS — E83.42 HYPOMAGNESEMIA: ICD-10-CM

## 2018-06-04 DIAGNOSIS — F41.9 ANXIETY: ICD-10-CM

## 2018-06-04 DIAGNOSIS — E78.2 MIXED HYPERLIPIDEMIA: ICD-10-CM

## 2018-06-04 DIAGNOSIS — E87.6 HYPOKALEMIA: ICD-10-CM

## 2018-06-04 PROCEDURE — G8399 PT W/DXA RESULTS DOCUMENT: HCPCS | Performed by: FAMILY MEDICINE

## 2018-06-04 PROCEDURE — 1036F TOBACCO NON-USER: CPT | Performed by: FAMILY MEDICINE

## 2018-06-04 PROCEDURE — 1090F PRES/ABSN URINE INCON ASSESS: CPT | Performed by: FAMILY MEDICINE

## 2018-06-04 PROCEDURE — 99214 OFFICE O/P EST MOD 30 MIN: CPT | Performed by: FAMILY MEDICINE

## 2018-06-04 PROCEDURE — G8427 DOCREV CUR MEDS BY ELIG CLIN: HCPCS | Performed by: FAMILY MEDICINE

## 2018-06-04 PROCEDURE — G8420 CALC BMI NORM PARAMETERS: HCPCS | Performed by: FAMILY MEDICINE

## 2018-06-04 PROCEDURE — 1123F ACP DISCUSS/DSCN MKR DOCD: CPT | Performed by: FAMILY MEDICINE

## 2018-06-04 PROCEDURE — 3017F COLORECTAL CA SCREEN DOC REV: CPT | Performed by: FAMILY MEDICINE

## 2018-06-04 PROCEDURE — 4040F PNEUMOC VAC/ADMIN/RCVD: CPT | Performed by: FAMILY MEDICINE

## 2018-06-04 RX ORDER — ALPRAZOLAM 0.25 MG/1
0.25 TABLET ORAL EVERY 8 HOURS PRN
Qty: 270 TABLET | Refills: 0 | Status: SHIPPED | OUTPATIENT
Start: 2018-06-18 | End: 2018-09-06 | Stop reason: SDUPTHER

## 2018-06-04 RX ORDER — DILTIAZEM HYDROCHLORIDE 240 MG/1
240 CAPSULE, COATED, EXTENDED RELEASE ORAL DAILY
COMMUNITY
End: 2018-09-06

## 2018-06-04 RX ORDER — DILTIAZEM HYDROCHLORIDE 240 MG/1
240 CAPSULE, COATED, EXTENDED RELEASE ORAL
COMMUNITY
Start: 2018-04-05 | End: 2018-06-04 | Stop reason: DRUGHIGH

## 2018-06-04 RX ORDER — PROPAFENONE HYDROCHLORIDE 225 MG/1
225 CAPSULE, EXTENDED RELEASE ORAL 2 TIMES DAILY
Qty: 60 CAPSULE | Refills: 5 | Status: SHIPPED | OUTPATIENT
Start: 2018-06-04 | End: 2018-06-29 | Stop reason: DRUGHIGH

## 2018-06-04 RX ORDER — LOSARTAN POTASSIUM 25 MG/1
12.5 TABLET ORAL DAILY
Qty: 30 TABLET | Refills: 5 | Status: SHIPPED | OUTPATIENT
Start: 2018-06-04 | End: 2018-08-06 | Stop reason: SINTOL

## 2018-06-04 RX ORDER — PROPAFENONE HYDROCHLORIDE 225 MG/1
225 CAPSULE, EXTENDED RELEASE ORAL 2 TIMES DAILY
COMMUNITY
End: 2018-06-04 | Stop reason: SDUPTHER

## 2018-06-04 ASSESSMENT — ENCOUNTER SYMPTOMS
SINUS PRESSURE: 0
NAUSEA: 0
RHINORRHEA: 0
EYE DISCHARGE: 0
VOMITING: 0
EYE REDNESS: 0
SHORTNESS OF BREATH: 0
ABDOMINAL PAIN: 0
COUGH: 0
CONSTIPATION: 0
WHEEZING: 0
DIARRHEA: 0
TROUBLE SWALLOWING: 0
SORE THROAT: 0

## 2018-06-21 LAB
INR BLD: NORMAL
PROTIME: 2.7 SECONDS

## 2018-06-29 RX ORDER — PROPAFENONE HYDROCHLORIDE 150 MG/1
75 TABLET, FILM COATED ORAL 3 TIMES DAILY
Qty: 135 TABLET | Refills: 1 | Status: SHIPPED | OUTPATIENT
Start: 2018-06-29 | End: 2019-12-13 | Stop reason: ALTCHOICE

## 2018-07-05 ENCOUNTER — ANTI-COAG VISIT (OUTPATIENT)
Dept: FAMILY MEDICINE CLINIC | Age: 68
End: 2018-07-05

## 2018-07-10 RX ORDER — CLINDAMYCIN HYDROCHLORIDE 300 MG/1
CAPSULE ORAL
Qty: 6 CAPSULE | Refills: 0 | Status: SHIPPED | OUTPATIENT
Start: 2018-07-10 | End: 2019-06-06 | Stop reason: SDUPTHER

## 2018-07-10 NOTE — TELEPHONE ENCOUNTER
Hoyt Romberg called requesting a refill of the below medication which has been pended for you:     Requested Prescriptions     Pending Prescriptions Disp Refills    clindamycin (CLEOCIN) 300 MG capsule 6 capsule 0     Sig: Take 600 mg 1 hr prior to dental procedures       Last Appointment Date: 6/4/2018  Next Appointment Date: 12/6/2018    Allergies   Allergen Reactions    Amiodarone Shortness Of Breath     Authoring Clinician or Source System: Yoana Grewal    Amoxicillin Anaphylaxis    Beta Adrenergic Blockers Shortness Of Breath    Metoprolol Shortness Of Breath     \"Beta Blockers    Penicillin V Potassium Anaphylaxis    Dronedarone Other (See Comments)     Irregular Heart Beats  Authoring Clinician or Source System: Kyara Schmitt  (Multaq)    Epinephrine Other (See Comments)     dental admin gave her afib    Lisinopril Other (See Comments)     Eyes were beating along with her heart beat    Morphine Nausea Only    Statins     Nexium [Esomeprazole Magnesium] Anxiety     Heart flutters more than normal.

## 2018-07-12 LAB
INR BLD: NORMAL
PROTIME: 3.6 SECONDS

## 2018-07-17 RX ORDER — DILTIAZEM HYDROCHLORIDE 60 MG/1
TABLET, FILM COATED ORAL
Qty: 270 TABLET | Refills: 0 | Status: SHIPPED | OUTPATIENT
Start: 2018-07-17 | End: 2019-02-04 | Stop reason: SDUPTHER

## 2018-07-19 ENCOUNTER — TELEPHONE (OUTPATIENT)
Dept: FAMILY MEDICINE CLINIC | Age: 68
End: 2018-07-19

## 2018-07-24 ENCOUNTER — HOSPITAL ENCOUNTER (OUTPATIENT)
Dept: LAB | Age: 68
Setting detail: SPECIMEN
Discharge: HOME | End: 2018-07-24
Payer: MEDICARE

## 2018-07-24 DIAGNOSIS — E83.42 HYPOMAGNESEMIA: ICD-10-CM

## 2018-07-24 DIAGNOSIS — E87.6 HYPOKALEMIA: ICD-10-CM

## 2018-07-24 LAB
ANION GAP SERPL CALCULATED.3IONS-SCNC: 11 MMOL/L (ref 9–17)
BUN BLDV-MCNC: 13 MG/DL (ref 8–23)
BUN/CREAT BLD: 16 (ref 9–20)
CALCIUM SERPL-MCNC: 9.8 MG/DL (ref 8.6–10.4)
CHLORIDE BLD-SCNC: 100 MMOL/L (ref 98–107)
CO2: 30 MMOL/L (ref 20–31)
CREAT SERPL-MCNC: 0.83 MG/DL (ref 0.5–0.9)
GFR AFRICAN AMERICAN: >60 ML/MIN
GFR NON-AFRICAN AMERICAN: >60 ML/MIN
GFR SERPL CREATININE-BSD FRML MDRD: NORMAL ML/MIN/{1.73_M2}
GFR SERPL CREATININE-BSD FRML MDRD: NORMAL ML/MIN/{1.73_M2}
GLUCOSE BLD-MCNC: 95 MG/DL (ref 70–99)
MAGNESIUM: 2 MG/DL (ref 1.6–2.6)
POTASSIUM SERPL-SCNC: 4.7 MMOL/L (ref 3.7–5.3)
SODIUM BLD-SCNC: 141 MMOL/L (ref 135–144)

## 2018-07-24 PROCEDURE — 83735 ASSAY OF MAGNESIUM: CPT

## 2018-07-24 PROCEDURE — 36415 COLL VENOUS BLD VENIPUNCTURE: CPT

## 2018-07-24 PROCEDURE — 80048 BASIC METABOLIC PNL TOTAL CA: CPT

## 2018-08-02 LAB
INR BLD: 2.9
PROTIME: NORMAL SECONDS

## 2018-08-03 ENCOUNTER — HOSPITAL ENCOUNTER (OUTPATIENT)
Dept: LAB | Age: 68
Setting detail: SPECIMEN
Discharge: HOME | End: 2018-08-03
Payer: MEDICARE

## 2018-08-03 LAB
INR BLD: 2.7
PROTHROMBIN TIME: 26.8 SEC (ref 9.4–11.3)

## 2018-08-03 PROCEDURE — 85610 PROTHROMBIN TIME: CPT

## 2018-08-03 PROCEDURE — 36415 COLL VENOUS BLD VENIPUNCTURE: CPT

## 2018-08-05 ENCOUNTER — PATIENT MESSAGE (OUTPATIENT)
Dept: FAMILY MEDICINE CLINIC | Age: 68
End: 2018-08-05

## 2018-08-06 ENCOUNTER — PATIENT MESSAGE (OUTPATIENT)
Dept: FAMILY MEDICINE CLINIC | Age: 68
End: 2018-08-06

## 2018-08-06 RX ORDER — LISINOPRIL 5 MG/1
5 TABLET ORAL DAILY
Qty: 90 TABLET | Refills: 1 | Status: SHIPPED | OUTPATIENT
Start: 2018-08-06 | End: 2018-10-02

## 2018-08-08 ENCOUNTER — HOSPITAL ENCOUNTER (OUTPATIENT)
Dept: LAB | Age: 68
Setting detail: SPECIMEN
Discharge: HOME | End: 2018-08-08
Payer: MEDICARE

## 2018-08-08 ENCOUNTER — PATIENT MESSAGE (OUTPATIENT)
Dept: FAMILY MEDICINE CLINIC | Age: 68
End: 2018-08-08

## 2018-08-08 LAB
ABSOLUTE EOS #: 0.2 K/UL (ref 0–0.4)
ABSOLUTE IMMATURE GRANULOCYTE: NORMAL K/UL (ref 0–0.3)
ABSOLUTE LYMPH #: 1.7 K/UL (ref 1–4.8)
ABSOLUTE MONO #: 0.7 K/UL (ref 0.1–1.2)
ANION GAP SERPL CALCULATED.3IONS-SCNC: 10 MMOL/L (ref 9–17)
BASOPHILS # BLD: 1 % (ref 0–1)
BASOPHILS ABSOLUTE: 0 K/UL (ref 0–0.2)
BUN BLDV-MCNC: 15 MG/DL (ref 8–23)
BUN/CREAT BLD: 18 (ref 9–20)
CALCIUM SERPL-MCNC: 9.4 MG/DL (ref 8.6–10.4)
CHLORIDE BLD-SCNC: 100 MMOL/L (ref 98–107)
CO2: 27 MMOL/L (ref 20–31)
CREAT SERPL-MCNC: 0.82 MG/DL (ref 0.5–0.9)
DIFFERENTIAL TYPE: NORMAL
EOSINOPHILS RELATIVE PERCENT: 3 % (ref 1–7)
GFR AFRICAN AMERICAN: >60 ML/MIN
GFR NON-AFRICAN AMERICAN: >60 ML/MIN
GFR SERPL CREATININE-BSD FRML MDRD: ABNORMAL ML/MIN/{1.73_M2}
GFR SERPL CREATININE-BSD FRML MDRD: ABNORMAL ML/MIN/{1.73_M2}
GLUCOSE BLD-MCNC: 101 MG/DL (ref 70–99)
HCT VFR BLD CALC: 42 % (ref 36–46)
HEMOGLOBIN: 14.2 G/DL (ref 12–16)
IMMATURE GRANULOCYTES: NORMAL %
INR BLD: 2.8
LYMPHOCYTES # BLD: 27 % (ref 16–46)
MCH RBC QN AUTO: 32.3 PG (ref 26–34)
MCHC RBC AUTO-ENTMCNC: 33.7 G/DL (ref 31–37)
MCV RBC AUTO: 95.7 FL (ref 80–100)
MONOCYTES # BLD: 10 % (ref 4–11)
NRBC AUTOMATED: NORMAL PER 100 WBC
PDW BLD-RTO: 12.2 % (ref 11–14.5)
PLATELET # BLD: 296 K/UL (ref 140–450)
PLATELET ESTIMATE: NORMAL
PMV BLD AUTO: 8 FL (ref 6–12)
POTASSIUM SERPL-SCNC: 4.4 MMOL/L (ref 3.7–5.3)
PROTHROMBIN TIME: 28 SEC (ref 9.4–11.3)
RBC # BLD: 4.39 M/UL (ref 4–5.2)
RBC # BLD: NORMAL 10*6/UL
SEG NEUTROPHILS: 59 % (ref 43–77)
SEGMENTED NEUTROPHILS ABSOLUTE COUNT: 3.7 K/UL (ref 1.8–7.7)
SODIUM BLD-SCNC: 137 MMOL/L (ref 135–144)
WBC # BLD: 6.3 K/UL (ref 3.5–11)
WBC # BLD: NORMAL 10*3/UL

## 2018-08-08 PROCEDURE — 36415 COLL VENOUS BLD VENIPUNCTURE: CPT

## 2018-08-08 PROCEDURE — 80048 BASIC METABOLIC PNL TOTAL CA: CPT

## 2018-08-08 PROCEDURE — 85610 PROTHROMBIN TIME: CPT

## 2018-08-08 PROCEDURE — 85025 COMPLETE CBC W/AUTO DIFF WBC: CPT

## 2018-08-08 NOTE — TELEPHONE ENCOUNTER
From: Chiki May  To: Adam Cheek DO  Sent: 8/8/2018 12:53 PM EDT  Subject: Test Results Question    Hi Dr. Yasmany Freeman,     If you have, or can access the results from my blood work on 8/3, (INR), and from today, 8/8, would you please post them on my chart? No one has called me from [facility], I am having an ablation Friday, and need to know what the blood work results are, especially my INR. The only thing I can think of as to why I have not heard from them is, there is a particular nurse in charge this week that chooses to make my life a living hell. She even went so far as to delete one of my appointments up there, and I caught it. Thank you very much!   Cynthia Moralez

## 2018-08-09 LAB
INR BLD: NORMAL
PROTIME: 3 SECONDS

## 2018-08-16 LAB
INR BLD: 2.8
PROTIME: NORMAL SECONDS

## 2018-09-06 ENCOUNTER — PATIENT MESSAGE (OUTPATIENT)
Dept: FAMILY MEDICINE CLINIC | Age: 68
End: 2018-09-06

## 2018-09-06 DIAGNOSIS — F41.9 ANXIETY: ICD-10-CM

## 2018-09-06 LAB
INR BLD: 3.1
PROTIME: NORMAL SECONDS

## 2018-09-06 RX ORDER — DILTIAZEM HYDROCHLORIDE 240 MG/1
240 CAPSULE, COATED, EXTENDED RELEASE ORAL 2 TIMES DAILY
Qty: 180 CAPSULE | Refills: 1 | Status: SHIPPED | OUTPATIENT
Start: 2018-09-06 | End: 2019-02-04 | Stop reason: SDUPTHER

## 2018-09-06 RX ORDER — ALPRAZOLAM 0.25 MG/1
0.25 TABLET ORAL EVERY 8 HOURS PRN
Qty: 270 TABLET | Refills: 0 | Status: SHIPPED | OUTPATIENT
Start: 2018-09-06 | End: 2019-01-07 | Stop reason: SDUPTHER

## 2018-09-06 NOTE — TELEPHONE ENCOUNTER
From: Eric Roberts  To: Rose Goldman DO  Sent: 9/6/2018 1:15 PM EDT  Subject: Prescription Question    Hello,     I need a refill on 'Diltiazem 240 MG extended release capsule, CARTIA  mg', which I take 2x's a day, 3 month supply, 180 caps. Please send that into [facility] in [address]. I cannot request a refill on my chart as it is not listed in the request refill section for some reason.     Thank you very much,   Ilda Ochoa

## 2018-09-06 NOTE — TELEPHONE ENCOUNTER
From: Morris Brizuela  Sent: 9/6/2018 1:03 PM EDT  Subject: Medication Renewal Request    Morris Brizuela would like a refill of the following medications:     ALPRAZolam Clydjeff Carrera) 0.25 MG tablet Nu Peralta DO]    Preferred pharmacy: [address] [place] [address] [phone] Abbyteresa Lau [phone]

## 2018-09-25 ENCOUNTER — HOSPITAL ENCOUNTER (OUTPATIENT)
Dept: LAB | Age: 68
Setting detail: SPECIMEN
Discharge: HOME | End: 2018-09-25
Payer: MEDICARE

## 2018-09-25 DIAGNOSIS — E83.42 HYPOMAGNESEMIA: ICD-10-CM

## 2018-09-25 DIAGNOSIS — E87.6 HYPOKALEMIA: ICD-10-CM

## 2018-09-25 LAB
ANION GAP SERPL CALCULATED.3IONS-SCNC: 13 MMOL/L (ref 9–17)
BUN BLDV-MCNC: 10 MG/DL (ref 8–23)
BUN/CREAT BLD: 14 (ref 9–20)
CALCIUM SERPL-MCNC: 9.7 MG/DL (ref 8.6–10.4)
CHLORIDE BLD-SCNC: 99 MMOL/L (ref 98–107)
CO2: 27 MMOL/L (ref 20–31)
CREAT SERPL-MCNC: 0.7 MG/DL (ref 0.5–0.9)
GFR AFRICAN AMERICAN: >60 ML/MIN
GFR NON-AFRICAN AMERICAN: >60 ML/MIN
GFR SERPL CREATININE-BSD FRML MDRD: NORMAL ML/MIN/{1.73_M2}
GFR SERPL CREATININE-BSD FRML MDRD: NORMAL ML/MIN/{1.73_M2}
GLUCOSE BLD-MCNC: 98 MG/DL (ref 70–99)
MAGNESIUM: 2.1 MG/DL (ref 1.6–2.6)
POTASSIUM SERPL-SCNC: 4.4 MMOL/L (ref 3.7–5.3)
SODIUM BLD-SCNC: 139 MMOL/L (ref 135–144)

## 2018-09-25 PROCEDURE — 83735 ASSAY OF MAGNESIUM: CPT

## 2018-09-25 PROCEDURE — 80048 BASIC METABOLIC PNL TOTAL CA: CPT

## 2018-09-25 PROCEDURE — 36415 COLL VENOUS BLD VENIPUNCTURE: CPT

## 2018-09-27 LAB
INR BLD: NORMAL
PROTIME: 3.5 SECONDS

## 2018-10-01 ENCOUNTER — PATIENT MESSAGE (OUTPATIENT)
Dept: FAMILY MEDICINE CLINIC | Age: 68
End: 2018-10-01

## 2018-10-01 DIAGNOSIS — I48.0 PAROXYSMAL ATRIAL FIBRILLATION (HCC): Primary | ICD-10-CM

## 2018-10-02 RX ORDER — LISINOPRIL 5 MG/1
5 TABLET ORAL DAILY
Qty: 90 TABLET | Refills: 1 | Status: CANCELLED | OUTPATIENT
Start: 2018-10-02

## 2018-10-02 RX ORDER — LISINOPRIL 10 MG/1
10 TABLET ORAL 2 TIMES DAILY
Qty: 180 TABLET | Refills: 1 | Status: SHIPPED | OUTPATIENT
Start: 2018-10-02 | End: 2018-12-06 | Stop reason: ALTCHOICE

## 2018-10-02 NOTE — TELEPHONE ENCOUNTER
From: Karolina Arriaga  Sent: 10/2/2018 1:18 PM EDT  Subject: Medication Renewal Request    Karolina Arriaga would like a refill of the following medications:     lisinopril (PRINIVIL;ZESTRIL) 5 MG tablet Isabelle Joaquin, DO]   Patient Comment: Cardiologist said to take up to 15-20mg which I have been doing and it seems to help. I tried to refill this and its too early. May I please have a new script for 10mg, 2x's a day, that way I can cut tabs for 15mg if needed. Thank you.      Preferred pharmacy: [facility] [address] - [place], [address] - F [phone]

## 2018-10-04 LAB
INR BLD: 2.7
PROTIME: NORMAL SECONDS

## 2018-10-11 LAB
INR BLD: 2.9
PROTIME: NORMAL SECONDS

## 2018-10-18 LAB
INR BLD: 3.2
PROTIME: NORMAL SECONDS

## 2018-10-19 ENCOUNTER — TELEPHONE (OUTPATIENT)
Dept: FAMILY MEDICINE CLINIC | Age: 68
End: 2018-10-19

## 2018-10-19 ENCOUNTER — HOSPITAL ENCOUNTER (OUTPATIENT)
Dept: LAB | Age: 68
Discharge: HOME | End: 2018-10-19
Payer: MEDICARE

## 2018-10-19 ENCOUNTER — ANTI-COAG VISIT (OUTPATIENT)
Dept: FAMILY MEDICINE CLINIC | Age: 68
End: 2018-10-19

## 2018-10-19 DIAGNOSIS — I48.0 PAROXYSMAL ATRIAL FIBRILLATION (HCC): ICD-10-CM

## 2018-10-19 DIAGNOSIS — E87.6 HYPOKALEMIA: ICD-10-CM

## 2018-10-19 DIAGNOSIS — E83.42 HYPOMAGNESEMIA: ICD-10-CM

## 2018-10-19 LAB
ANION GAP SERPL CALCULATED.3IONS-SCNC: 10 MMOL/L (ref 9–17)
BUN BLDV-MCNC: 13 MG/DL (ref 8–23)
BUN/CREAT BLD: 17 (ref 9–20)
CALCIUM SERPL-MCNC: 9.7 MG/DL (ref 8.6–10.4)
CHLORIDE BLD-SCNC: 100 MMOL/L (ref 98–107)
CO2: 27 MMOL/L (ref 20–31)
CREAT SERPL-MCNC: 0.78 MG/DL (ref 0.5–0.9)
GFR AFRICAN AMERICAN: >60 ML/MIN
GFR NON-AFRICAN AMERICAN: >60 ML/MIN
GFR SERPL CREATININE-BSD FRML MDRD: NORMAL ML/MIN/{1.73_M2}
GFR SERPL CREATININE-BSD FRML MDRD: NORMAL ML/MIN/{1.73_M2}
GLUCOSE BLD-MCNC: 94 MG/DL (ref 70–99)
INR BLD: 3
MAGNESIUM: 2.2 MG/DL (ref 1.6–2.6)
POTASSIUM SERPL-SCNC: 4.6 MMOL/L (ref 3.7–5.3)
PROTHROMBIN TIME: 29.7 SEC (ref 9.4–11.3)
SODIUM BLD-SCNC: 137 MMOL/L (ref 135–144)

## 2018-10-19 PROCEDURE — 83735 ASSAY OF MAGNESIUM: CPT

## 2018-10-19 PROCEDURE — 36415 COLL VENOUS BLD VENIPUNCTURE: CPT

## 2018-10-19 PROCEDURE — 85610 PROTHROMBIN TIME: CPT

## 2018-10-19 PROCEDURE — 80048 BASIC METABOLIC PNL TOTAL CA: CPT

## 2018-10-19 NOTE — PROGRESS NOTES
Patient had INR drawn at clinic which showed 3.0. 10/18/19 had done a home test and was 3.2. Company had told patient that the test strips were defective but would not send new strips until she was due.  Patient aware of results and is being managed by cardiologist.

## 2018-11-07 ENCOUNTER — PATIENT MESSAGE (OUTPATIENT)
Dept: FAMILY MEDICINE CLINIC | Age: 68
End: 2018-11-07

## 2018-11-07 RX ORDER — VALACYCLOVIR HYDROCHLORIDE 1 G/1
TABLET, FILM COATED ORAL
Qty: 4 TABLET | Refills: 0 | Status: SHIPPED | OUTPATIENT
Start: 2018-11-07 | End: 2019-02-13 | Stop reason: SDUPTHER

## 2018-11-08 LAB
INR BLD: 2.7
PROTIME: NORMAL SECONDS

## 2018-11-15 ENCOUNTER — HOSPITAL ENCOUNTER (EMERGENCY)
Age: 68
Discharge: HOME | End: 2018-11-15
Attending: EMERGENCY MEDICINE
Payer: MEDICARE

## 2018-11-15 ENCOUNTER — PATIENT MESSAGE (OUTPATIENT)
Dept: FAMILY MEDICINE CLINIC | Age: 68
End: 2018-11-15

## 2018-11-15 ENCOUNTER — APPOINTMENT (OUTPATIENT)
Dept: GENERAL RADIOLOGY | Age: 68
End: 2018-11-15
Payer: MEDICARE

## 2018-11-15 VITALS
TEMPERATURE: 97.6 F | HEART RATE: 63 BPM | SYSTOLIC BLOOD PRESSURE: 147 MMHG | DIASTOLIC BLOOD PRESSURE: 67 MMHG | RESPIRATION RATE: 16 BRPM | WEIGHT: 124 LBS | BODY MASS INDEX: 22.68 KG/M2 | OXYGEN SATURATION: 93 %

## 2018-11-15 DIAGNOSIS — I10 HYPERTENSION, UNSPECIFIED TYPE: Primary | ICD-10-CM

## 2018-11-15 LAB
ABSOLUTE EOS #: 0 K/UL (ref 0–0.4)
ABSOLUTE IMMATURE GRANULOCYTE: ABNORMAL K/UL (ref 0–0.3)
ABSOLUTE LYMPH #: 1 K/UL (ref 1–4.8)
ABSOLUTE MONO #: 0.6 K/UL (ref 0.1–1.2)
ALBUMIN SERPL-MCNC: 4.4 G/DL (ref 3.5–5.2)
ALBUMIN/GLOBULIN RATIO: 1.2 (ref 1–2.5)
ALP BLD-CCNC: 69 U/L (ref 35–104)
ALT SERPL-CCNC: 30 U/L (ref 5–33)
AMYLASE: 34 U/L (ref 28–100)
ANION GAP SERPL CALCULATED.3IONS-SCNC: 10 MMOL/L (ref 9–17)
AST SERPL-CCNC: 30 U/L
BASOPHILS # BLD: 0 % (ref 0–1)
BASOPHILS ABSOLUTE: 0 K/UL (ref 0–0.2)
BILIRUB SERPL-MCNC: 0.39 MG/DL (ref 0.3–1.2)
BILIRUBIN DIRECT: 0.09 MG/DL
BILIRUBIN, INDIRECT: 0.3 MG/DL (ref 0–1)
BUN BLDV-MCNC: 14 MG/DL (ref 8–23)
BUN/CREAT BLD: 15 (ref 9–20)
CALCIUM SERPL-MCNC: 9.9 MG/DL (ref 8.6–10.4)
CHLORIDE BLD-SCNC: 99 MMOL/L (ref 98–107)
CO2: 29 MMOL/L (ref 20–31)
CREAT SERPL-MCNC: 0.95 MG/DL (ref 0.5–0.9)
DIFFERENTIAL TYPE: ABNORMAL
EKG ATRIAL RATE: 65 BPM
EKG P AXIS: 63 DEGREES
EKG P-R INTERVAL: 222 MS
EKG Q-T INTERVAL: 428 MS
EKG QRS DURATION: 86 MS
EKG QTC CALCULATION (BAZETT): 445 MS
EKG R AXIS: 21 DEGREES
EKG T AXIS: -130 DEGREES
EKG VENTRICULAR RATE: 65 BPM
EOSINOPHILS RELATIVE PERCENT: 1 % (ref 1–7)
GFR AFRICAN AMERICAN: >60 ML/MIN
GFR NON-AFRICAN AMERICAN: 59 ML/MIN
GFR SERPL CREATININE-BSD FRML MDRD: ABNORMAL ML/MIN/{1.73_M2}
GFR SERPL CREATININE-BSD FRML MDRD: ABNORMAL ML/MIN/{1.73_M2}
GLOBULIN: 3.8 G/DL (ref 1.5–3.8)
GLUCOSE BLD-MCNC: 108 MG/DL (ref 70–99)
HCT VFR BLD CALC: 40.2 % (ref 36–46)
HEMOGLOBIN: 13.3 G/DL (ref 12–16)
IMMATURE GRANULOCYTES: ABNORMAL %
LIPASE: 40 U/L (ref 13–60)
LYMPHOCYTES # BLD: 13 % (ref 16–46)
MCH RBC QN AUTO: 31.2 PG (ref 26–34)
MCHC RBC AUTO-ENTMCNC: 33.2 G/DL (ref 31–37)
MCV RBC AUTO: 94.1 FL (ref 80–100)
MONOCYTES # BLD: 7 % (ref 4–11)
NRBC AUTOMATED: ABNORMAL PER 100 WBC
PDW BLD-RTO: 12.5 % (ref 11–14.5)
PLATELET # BLD: 359 K/UL (ref 140–450)
PLATELET ESTIMATE: ABNORMAL
PMV BLD AUTO: 7.4 FL (ref 6–12)
POTASSIUM SERPL-SCNC: 4.6 MMOL/L (ref 3.7–5.3)
RBC # BLD: 4.27 M/UL (ref 4–5.2)
RBC # BLD: ABNORMAL 10*6/UL
SEG NEUTROPHILS: 79 % (ref 43–77)
SEGMENTED NEUTROPHILS ABSOLUTE COUNT: 6.3 K/UL (ref 1.8–7.7)
SODIUM BLD-SCNC: 138 MMOL/L (ref 135–144)
TOTAL PROTEIN: 8.2 G/DL (ref 6.4–8.3)
TROPONIN INTERP: NORMAL
TROPONIN T: <0.03 NG/ML
WBC # BLD: 8 K/UL (ref 3.5–11)
WBC # BLD: ABNORMAL 10*3/UL

## 2018-11-15 PROCEDURE — 82150 ASSAY OF AMYLASE: CPT

## 2018-11-15 PROCEDURE — 99284 EMERGENCY DEPT VISIT MOD MDM: CPT

## 2018-11-15 PROCEDURE — 80048 BASIC METABOLIC PNL TOTAL CA: CPT

## 2018-11-15 PROCEDURE — 84484 ASSAY OF TROPONIN QUANT: CPT

## 2018-11-15 PROCEDURE — 93005 ELECTROCARDIOGRAM TRACING: CPT

## 2018-11-15 PROCEDURE — 83690 ASSAY OF LIPASE: CPT

## 2018-11-15 PROCEDURE — 85025 COMPLETE CBC W/AUTO DIFF WBC: CPT

## 2018-11-15 PROCEDURE — 71045 X-RAY EXAM CHEST 1 VIEW: CPT

## 2018-11-15 PROCEDURE — 80076 HEPATIC FUNCTION PANEL: CPT

## 2018-11-15 ASSESSMENT — PAIN SCALES - GENERAL
PAINLEVEL_OUTOF10: 5
PAINLEVEL_OUTOF10: 2

## 2018-11-15 ASSESSMENT — PAIN DESCRIPTION - LOCATION: LOCATION: HEAD

## 2018-11-19 ENCOUNTER — PATIENT MESSAGE (OUTPATIENT)
Dept: FAMILY MEDICINE CLINIC | Age: 68
End: 2018-11-19

## 2018-11-19 RX ORDER — WARFARIN SODIUM 5 MG/1
TABLET ORAL
Qty: 90 TABLET | Refills: 3 | Status: SHIPPED | OUTPATIENT
Start: 2018-11-19 | End: 2020-06-15 | Stop reason: ALTCHOICE

## 2018-11-23 ENCOUNTER — HOSPITAL ENCOUNTER (OUTPATIENT)
Dept: LAB | Age: 68
Discharge: HOME | End: 2018-11-23
Payer: MEDICARE

## 2018-11-23 LAB
ANION GAP SERPL CALCULATED.3IONS-SCNC: 11 MMOL/L (ref 9–17)
BUN BLDV-MCNC: 21 MG/DL (ref 8–23)
BUN/CREAT BLD: 26 (ref 9–20)
CALCIUM SERPL-MCNC: 9.3 MG/DL (ref 8.6–10.4)
CHLORIDE BLD-SCNC: 101 MMOL/L (ref 98–107)
CO2: 27 MMOL/L (ref 20–31)
CREAT SERPL-MCNC: 0.81 MG/DL (ref 0.5–0.9)
GFR AFRICAN AMERICAN: >60 ML/MIN
GFR NON-AFRICAN AMERICAN: >60 ML/MIN
GFR SERPL CREATININE-BSD FRML MDRD: ABNORMAL ML/MIN/{1.73_M2}
GFR SERPL CREATININE-BSD FRML MDRD: ABNORMAL ML/MIN/{1.73_M2}
GLUCOSE BLD-MCNC: 94 MG/DL (ref 70–99)
POTASSIUM SERPL-SCNC: 4.7 MMOL/L (ref 3.7–5.3)
SODIUM BLD-SCNC: 139 MMOL/L (ref 135–144)

## 2018-11-23 PROCEDURE — 36415 COLL VENOUS BLD VENIPUNCTURE: CPT

## 2018-11-23 PROCEDURE — 80048 BASIC METABOLIC PNL TOTAL CA: CPT

## 2018-11-26 ENCOUNTER — PATIENT MESSAGE (OUTPATIENT)
Dept: FAMILY MEDICINE CLINIC | Age: 68
End: 2018-11-26

## 2018-11-26 NOTE — TELEPHONE ENCOUNTER
From: Xiomara Echavarria  To: Armando Cordero DO  Sent: 11/26/2018 12:27 PM EST  Subject: Test Results Question    Elly Jones,    My cardiologist at [facility] ordered the [facility] that I had done on Friday. Lab was supposed to fax the results to him. I am glad you called to let me know what they were because his nurse never checks the portal nor uses it. It will be interesting to see if they call me about it or not. Cardio is trying to help control my high b/p and thought my electrolytes were off that's why he faxed the order. I have to repeat it this friday. He asked me why my PCP was writing my scripts for B/P meds, my response was, \"because she is a lot easier to get ahold of than you are. \"     Thanks again, hope you had a good Thanksgiving!   Bryon Zaidi

## 2018-11-30 ENCOUNTER — HOSPITAL ENCOUNTER (OUTPATIENT)
Dept: LAB | Age: 68
Discharge: HOME | End: 2018-11-30
Payer: MEDICARE

## 2018-11-30 LAB
ANION GAP SERPL CALCULATED.3IONS-SCNC: 12 MMOL/L (ref 9–17)
BUN BLDV-MCNC: 18 MG/DL (ref 8–23)
BUN/CREAT BLD: 25 (ref 9–20)
CALCIUM SERPL-MCNC: 9.5 MG/DL (ref 8.6–10.4)
CHLORIDE BLD-SCNC: 99 MMOL/L (ref 98–107)
CO2: 27 MMOL/L (ref 20–31)
CREAT SERPL-MCNC: 0.72 MG/DL (ref 0.5–0.9)
GFR AFRICAN AMERICAN: >60 ML/MIN
GFR NON-AFRICAN AMERICAN: >60 ML/MIN
GFR SERPL CREATININE-BSD FRML MDRD: ABNORMAL ML/MIN/{1.73_M2}
GFR SERPL CREATININE-BSD FRML MDRD: ABNORMAL ML/MIN/{1.73_M2}
GLUCOSE BLD-MCNC: 100 MG/DL (ref 70–99)
POTASSIUM SERPL-SCNC: 4.6 MMOL/L (ref 3.7–5.3)
SODIUM BLD-SCNC: 138 MMOL/L (ref 135–144)

## 2018-11-30 PROCEDURE — 36415 COLL VENOUS BLD VENIPUNCTURE: CPT

## 2018-11-30 PROCEDURE — 80048 BASIC METABOLIC PNL TOTAL CA: CPT

## 2018-12-05 ENCOUNTER — HOSPITAL ENCOUNTER (OUTPATIENT)
Dept: LAB | Age: 68
Setting detail: SPECIMEN
Discharge: HOME | End: 2018-12-05
Payer: MEDICARE

## 2018-12-05 DIAGNOSIS — E83.42 HYPOMAGNESEMIA: ICD-10-CM

## 2018-12-05 DIAGNOSIS — I10 ESSENTIAL HYPERTENSION: ICD-10-CM

## 2018-12-05 DIAGNOSIS — R73.01 IMPAIRED FASTING GLUCOSE: ICD-10-CM

## 2018-12-05 DIAGNOSIS — E78.2 MIXED HYPERLIPIDEMIA: ICD-10-CM

## 2018-12-05 LAB
ABSOLUTE EOS #: 0.2 K/UL (ref 0–0.4)
ABSOLUTE IMMATURE GRANULOCYTE: NORMAL K/UL (ref 0–0.3)
ABSOLUTE LYMPH #: 1.7 K/UL (ref 1–4.8)
ABSOLUTE MONO #: 0.8 K/UL (ref 0.1–1.2)
ALBUMIN SERPL-MCNC: 4.8 G/DL (ref 3.5–5.2)
ALBUMIN/GLOBULIN RATIO: 1.4 (ref 1–2.5)
ALP BLD-CCNC: 70 U/L (ref 35–104)
ALT SERPL-CCNC: 12 U/L (ref 5–33)
ANION GAP SERPL CALCULATED.3IONS-SCNC: 12 MMOL/L (ref 9–17)
AST SERPL-CCNC: 19 U/L
BASOPHILS # BLD: 1 % (ref 0–1)
BASOPHILS ABSOLUTE: 0 K/UL (ref 0–0.2)
BILIRUB SERPL-MCNC: 0.52 MG/DL (ref 0.3–1.2)
BUN BLDV-MCNC: 12 MG/DL (ref 8–23)
BUN/CREAT BLD: 17 (ref 9–20)
CALCIUM SERPL-MCNC: 9.7 MG/DL (ref 8.6–10.4)
CHLORIDE BLD-SCNC: 100 MMOL/L (ref 98–107)
CHOLESTEROL/HDL RATIO: 4.5
CHOLESTEROL: 247 MG/DL
CO2: 26 MMOL/L (ref 20–31)
CREAT SERPL-MCNC: 0.7 MG/DL (ref 0.5–0.9)
DIFFERENTIAL TYPE: NORMAL
EOSINOPHILS RELATIVE PERCENT: 2 % (ref 1–7)
ESTIMATED AVERAGE GLUCOSE: 111 MG/DL
GFR AFRICAN AMERICAN: >60 ML/MIN
GFR NON-AFRICAN AMERICAN: >60 ML/MIN
GFR SERPL CREATININE-BSD FRML MDRD: ABNORMAL ML/MIN/{1.73_M2}
GFR SERPL CREATININE-BSD FRML MDRD: ABNORMAL ML/MIN/{1.73_M2}
GLUCOSE BLD-MCNC: 109 MG/DL (ref 70–99)
HBA1C MFR BLD: 5.5 % (ref 4.8–5.9)
HCT VFR BLD CALC: 40.4 % (ref 36–46)
HDLC SERPL-MCNC: 55 MG/DL
HEMOGLOBIN: 13.5 G/DL (ref 12–16)
IMMATURE GRANULOCYTES: NORMAL %
LDL CHOLESTEROL: 162 MG/DL (ref 0–130)
LYMPHOCYTES # BLD: 21 % (ref 16–46)
MAGNESIUM: 2 MG/DL (ref 1.6–2.6)
MCH RBC QN AUTO: 31.6 PG (ref 26–34)
MCHC RBC AUTO-ENTMCNC: 33.5 G/DL (ref 31–37)
MCV RBC AUTO: 94.3 FL (ref 80–100)
MONOCYTES # BLD: 9 % (ref 4–11)
NRBC AUTOMATED: NORMAL PER 100 WBC
PDW BLD-RTO: 12.4 % (ref 11–14.5)
PLATELET # BLD: 346 K/UL (ref 140–450)
PLATELET ESTIMATE: NORMAL
PMV BLD AUTO: 7.7 FL (ref 6–12)
POTASSIUM SERPL-SCNC: 4.5 MMOL/L (ref 3.7–5.3)
RBC # BLD: 4.28 M/UL (ref 4–5.2)
RBC # BLD: NORMAL 10*6/UL
SEG NEUTROPHILS: 67 % (ref 43–77)
SEGMENTED NEUTROPHILS ABSOLUTE COUNT: 5.4 K/UL (ref 1.8–7.7)
SODIUM BLD-SCNC: 138 MMOL/L (ref 135–144)
TOTAL PROTEIN: 8.2 G/DL (ref 6.4–8.3)
TRIGL SERPL-MCNC: 151 MG/DL
VLDLC SERPL CALC-MCNC: ABNORMAL MG/DL (ref 1–30)
WBC # BLD: 8.1 K/UL (ref 3.5–11)
WBC # BLD: NORMAL 10*3/UL

## 2018-12-05 PROCEDURE — 85025 COMPLETE CBC W/AUTO DIFF WBC: CPT

## 2018-12-05 PROCEDURE — 36415 COLL VENOUS BLD VENIPUNCTURE: CPT

## 2018-12-05 PROCEDURE — 83735 ASSAY OF MAGNESIUM: CPT

## 2018-12-05 PROCEDURE — 83036 HEMOGLOBIN GLYCOSYLATED A1C: CPT

## 2018-12-05 PROCEDURE — 80053 COMPREHEN METABOLIC PANEL: CPT

## 2018-12-05 PROCEDURE — 80061 LIPID PANEL: CPT

## 2018-12-06 ENCOUNTER — OFFICE VISIT (OUTPATIENT)
Dept: FAMILY MEDICINE CLINIC | Age: 68
End: 2018-12-06
Payer: MEDICARE

## 2018-12-06 VITALS
HEIGHT: 63 IN | BODY MASS INDEX: 22.32 KG/M2 | HEART RATE: 60 BPM | SYSTOLIC BLOOD PRESSURE: 130 MMHG | RESPIRATION RATE: 16 BRPM | WEIGHT: 126 LBS | DIASTOLIC BLOOD PRESSURE: 70 MMHG

## 2018-12-06 DIAGNOSIS — R73.01 IMPAIRED FASTING GLUCOSE: ICD-10-CM

## 2018-12-06 DIAGNOSIS — E87.6 HYPOKALEMIA: ICD-10-CM

## 2018-12-06 DIAGNOSIS — I48.0 PAROXYSMAL ATRIAL FIBRILLATION (HCC): ICD-10-CM

## 2018-12-06 DIAGNOSIS — F41.9 ANXIETY: ICD-10-CM

## 2018-12-06 DIAGNOSIS — E83.42 HYPOMAGNESEMIA: ICD-10-CM

## 2018-12-06 DIAGNOSIS — I10 ESSENTIAL HYPERTENSION: Primary | ICD-10-CM

## 2018-12-06 DIAGNOSIS — E78.2 MIXED HYPERLIPIDEMIA: ICD-10-CM

## 2018-12-06 LAB
INR BLD: 2.6
PROTIME: NORMAL SECONDS

## 2018-12-06 PROCEDURE — 1101F PT FALLS ASSESS-DOCD LE1/YR: CPT | Performed by: FAMILY MEDICINE

## 2018-12-06 PROCEDURE — G8484 FLU IMMUNIZE NO ADMIN: HCPCS | Performed by: FAMILY MEDICINE

## 2018-12-06 PROCEDURE — 1090F PRES/ABSN URINE INCON ASSESS: CPT | Performed by: FAMILY MEDICINE

## 2018-12-06 PROCEDURE — G8420 CALC BMI NORM PARAMETERS: HCPCS | Performed by: FAMILY MEDICINE

## 2018-12-06 PROCEDURE — G8399 PT W/DXA RESULTS DOCUMENT: HCPCS | Performed by: FAMILY MEDICINE

## 2018-12-06 PROCEDURE — 1123F ACP DISCUSS/DSCN MKR DOCD: CPT | Performed by: FAMILY MEDICINE

## 2018-12-06 PROCEDURE — 1036F TOBACCO NON-USER: CPT | Performed by: FAMILY MEDICINE

## 2018-12-06 PROCEDURE — 99214 OFFICE O/P EST MOD 30 MIN: CPT | Performed by: FAMILY MEDICINE

## 2018-12-06 PROCEDURE — G8427 DOCREV CUR MEDS BY ELIG CLIN: HCPCS | Performed by: FAMILY MEDICINE

## 2018-12-06 PROCEDURE — 4040F PNEUMOC VAC/ADMIN/RCVD: CPT | Performed by: FAMILY MEDICINE

## 2018-12-06 PROCEDURE — 3017F COLORECTAL CA SCREEN DOC REV: CPT | Performed by: FAMILY MEDICINE

## 2018-12-06 RX ORDER — PAROXETINE 10 MG/1
10 TABLET, FILM COATED ORAL DAILY
Qty: 90 TABLET | Refills: 1 | Status: SHIPPED | OUTPATIENT
Start: 2018-12-06 | End: 2019-01-07

## 2018-12-06 RX ORDER — DILTIAZEM HYDROCHLORIDE 180 MG/1
180 CAPSULE, COATED, EXTENDED RELEASE ORAL DAILY
COMMUNITY
End: 2018-12-06 | Stop reason: SDUPTHER

## 2018-12-06 RX ORDER — DILTIAZEM HYDROCHLORIDE 180 MG/1
180 CAPSULE, COATED, EXTENDED RELEASE ORAL DAILY
Qty: 90 CAPSULE | Refills: 1 | Status: SHIPPED | OUTPATIENT
Start: 2018-12-06 | End: 2019-06-06 | Stop reason: SDUPTHER

## 2018-12-06 ASSESSMENT — PATIENT HEALTH QUESTIONNAIRE - PHQ9
SUM OF ALL RESPONSES TO PHQ QUESTIONS 1-9: 0
SUM OF ALL RESPONSES TO PHQ9 QUESTIONS 1 & 2: 0
SUM OF ALL RESPONSES TO PHQ QUESTIONS 1-9: 0
2. FEELING DOWN, DEPRESSED OR HOPELESS: 0
1. LITTLE INTEREST OR PLEASURE IN DOING THINGS: 0

## 2018-12-11 ASSESSMENT — ENCOUNTER SYMPTOMS
ABDOMINAL PAIN: 0
WHEEZING: 0
CONSTIPATION: 0
VOMITING: 0
SORE THROAT: 0
RHINORRHEA: 0
TROUBLE SWALLOWING: 0
COUGH: 0
SINUS PRESSURE: 0
NAUSEA: 0
SHORTNESS OF BREATH: 0
EYE REDNESS: 0
DIARRHEA: 0
EYE DISCHARGE: 0

## 2018-12-11 NOTE — PROGRESS NOTES
2018     Brendon Thompson (:  1950) is a 80 y.o. female, here for evaluation of the following medical concerns:    HPI  Patient comes in today for follow up of chronic health issues   Chief Complaint   Patient presents with    Medicare AWV     too early last 17; patient declines further mawv's    Hypertension     6 mo f/u    Hyperlipidemia     6 mo f/u    Blood Sugar Problem     IFG; 6 mo f/u    Discuss Labs     drawn 18   . Patient Has had increased situational stress dealing with her 's ongoing chronic health issues. She feels very stressed and anxious. She has also had her own health issues noting that her blood pressure has been elevating quite a bit she's been trying to work with the cardiologist at [facility] in order to stabilize her blood pressure. We did talk about adding medication to help with her anxiety and situational stress and she seems agreeable with this plan. I do feel Paxil would be beneficial for her. She seems agreeable. Blood pressure today is stable and controlled with her current medical regimen. She does have a known history of hyperlipidemia and her cholesterol levels remain elevated but she does note that she is not going to take statin medication due to side effects and potential interactions with her medications. She is working on continued dietary efforts and regular exercise in order to keep this reasonably well-controlled. Has a known history of paroxysmal atrial fibrillation and continues to have intermittent issues with this. Again is working with the cardiologist try to get this under better control and does note that they have made adjustments to her Cardizem dose to try to keep her rate stable. She notes she may consider going to the [facility] for second opinion regarding further medical recommendations to help control her symptoms.   She does have a known history of impaired fasting glucose and her blood sugar AST 19 <32 U/L    Total Bilirubin 0.52 0.3 - 1.2 mg/dL    Total Protein 8.2 6.4 - 8.3 g/dL    Alb 4.8 3.5 - 5.2 g/dL    Albumin/Globulin Ratio 1.4 1.0 - 2.5    GFR Non-African American >60 >60 mL/min    GFR African American >60 >60 mL/min    GFR Comment          GFR Staging NOT REPORTED    Lipid Panel   Result Value Ref Range    Cholesterol 247 (H) <200 mg/dL    HDL 55 >40 mg/dL    LDL Cholesterol 162 (H) 0 - 130 mg/dL    Chol/HDL Ratio 4.5 <5    Triglycerides 151 (H) <150 mg/dL    VLDL NOT REPORTED 1 - 30 mg/dL   Magnesium   Result Value Ref Range    Magnesium 2.0 1.6 - 2.6 mg/dL   Hemoglobin A1C   Result Value Ref Range    Hemoglobin A1C 5.5 4.8 - 5.9 %    Estimated Avg Glucose 111 mg/dL     Did discuss dietary modification and increased exercise to keep cholesterol and blood sugar under good control. Other review of systems are as noted below.;    Did review patient's med list, allergies, social history, fam history, pmhx and pshx today as noted in the record. Preventative measures are reviewed today. See health maintenance section for complete preventative plan of care. Review of Systems   Constitutional: Negative for chills, fatigue and fever. HENT: Negative for congestion, ear pain, postnasal drip, rhinorrhea, sinus pressure, sore throat and trouble swallowing. Eyes: Negative for discharge and redness. Respiratory: Negative for cough, shortness of breath and wheezing. Cardiovascular: Positive for palpitations (at times). Negative for chest pain. Gastrointestinal: Negative for abdominal pain, constipation, diarrhea, nausea and vomiting. Musculoskeletal: Negative for arthralgias, myalgias and neck pain. Skin: Negative for rash and wound. Allergic/Immunologic: Negative for environmental allergies. Neurological: Negative for dizziness, weakness, light-headedness and headaches. Hematological: Negative for adenopathy. Psychiatric/Behavioral: Negative for dysphoric mood.  The patient is

## 2018-12-20 LAB
INR BLD: 3.5
PROTIME: NORMAL SECONDS

## 2018-12-28 LAB
INR BLD: 2.7
PROTIME: NORMAL SECONDS

## 2019-01-05 LAB
INR BLD: 2.8
PROTIME: NORMAL SECONDS

## 2019-01-06 ENCOUNTER — PATIENT MESSAGE (OUTPATIENT)
Dept: FAMILY MEDICINE CLINIC | Age: 69
End: 2019-01-06

## 2019-01-06 DIAGNOSIS — F41.9 ANXIETY: ICD-10-CM

## 2019-01-07 RX ORDER — ALPRAZOLAM 0.25 MG/1
0.25 TABLET ORAL EVERY 8 HOURS PRN
Qty: 270 TABLET | Refills: 0 | Status: SHIPPED | OUTPATIENT
Start: 2019-01-07 | End: 2019-06-06 | Stop reason: SDUPTHER

## 2019-01-10 LAB
INR BLD: 2.9
PROTIME: NORMAL SECONDS

## 2019-01-24 LAB
INR BLD: 2.7
PROTIME: NORMAL SECONDS

## 2019-01-31 LAB
INR BLD: 3.1
PROTIME: NORMAL SECONDS

## 2019-02-04 RX ORDER — DILTIAZEM HYDROCHLORIDE 240 MG/1
240 CAPSULE, COATED, EXTENDED RELEASE ORAL NIGHTLY
COMMUNITY
End: 2019-02-05 | Stop reason: SDUPTHER

## 2019-02-04 RX ORDER — DILTIAZEM HYDROCHLORIDE 60 MG/1
TABLET, FILM COATED ORAL
Qty: 270 TABLET | Refills: 0 | Status: SHIPPED | OUTPATIENT
Start: 2019-02-04 | End: 2019-05-06 | Stop reason: SDUPTHER

## 2019-02-05 RX ORDER — DILTIAZEM HYDROCHLORIDE 240 MG/1
240 CAPSULE, COATED, EXTENDED RELEASE ORAL NIGHTLY
Qty: 90 CAPSULE | Refills: 1 | Status: SHIPPED | OUTPATIENT
Start: 2019-02-05 | End: 2019-08-12 | Stop reason: SDUPTHER

## 2019-02-13 ENCOUNTER — TELEPHONE (OUTPATIENT)
Dept: FAMILY MEDICINE CLINIC | Age: 69
End: 2019-02-13

## 2019-02-13 RX ORDER — VALACYCLOVIR HYDROCHLORIDE 500 MG/1
TABLET, FILM COATED ORAL
Qty: 6 TABLET | Refills: 2 | Status: SHIPPED | OUTPATIENT
Start: 2019-02-13 | End: 2021-02-26 | Stop reason: SDUPTHER

## 2019-02-13 RX ORDER — VALACYCLOVIR HYDROCHLORIDE 1 G/1
TABLET, FILM COATED ORAL
Qty: 4 TABLET | Refills: 0 | Status: CANCELLED | OUTPATIENT
Start: 2019-02-13

## 2019-02-28 LAB
INR BLD: 2.5
PROTIME: NORMAL SECONDS

## 2019-03-14 LAB
INR BLD: 2.5
PROTIME: NORMAL SECONDS

## 2019-03-21 LAB
INR BLD: 2.6
PROTIME: NORMAL SECONDS

## 2019-03-28 LAB
INR BLD: 2.3
PROTIME: NORMAL SECONDS

## 2019-03-28 RX ORDER — WARFARIN SODIUM 1 MG/1
TABLET ORAL
Qty: 270 TABLET | Refills: 0 | Status: SHIPPED | OUTPATIENT
Start: 2019-03-28 | End: 2019-04-01 | Stop reason: SDUPTHER

## 2019-03-31 ENCOUNTER — PATIENT MESSAGE (OUTPATIENT)
Dept: FAMILY MEDICINE CLINIC | Age: 69
End: 2019-03-31

## 2019-04-01 RX ORDER — WARFARIN SODIUM 1 MG/1
TABLET ORAL
Qty: 270 TABLET | Refills: 3 | Status: SHIPPED | OUTPATIENT
Start: 2019-04-01 | End: 2020-06-15 | Stop reason: ALTCHOICE

## 2019-04-01 NOTE — TELEPHONE ENCOUNTER
Kathy Srivastava called requesting a refill of the below medication which has been pended for you:     Requested Prescriptions     Pending Prescriptions Disp Refills    warfarin (COUMADIN) 1 MG tablet 270 tablet 3     Sig: TAKE 2 TO 3 TABLETS EVERY DAY AS DIRECTED  BY  INR (rotates with 5 mg)       Last Appointment Date: 12/6/2018  Next Appointment Date: 6/6/2019    Allergies   Allergen Reactions    Amiodarone Shortness Of Breath     Authoring Clinician or Source System: Ulises Ramonly    Amoxicillin Anaphylaxis    Beta Adrenergic Blockers Shortness Of Breath    Metoprolol Shortness Of Breath     \"Beta Blockers    Penicillin V Potassium Anaphylaxis    Dronedarone Other (See Comments)     Irregular Heart Beats  Authoring Clinician or Source System: Kyara Schmitt  (Multaq)    Epinephrine Other (See Comments)     dental admin gave her afib    Lisinopril Other (See Comments)     Eyes were beating along with her heart beat    Morphine Nausea Only    Statins     Nexium [Esomeprazole Magnesium] Anxiety     Heart flutters more than normal.    Proton Pump Inhibitors Nausea And Vomiting

## 2019-04-01 NOTE — TELEPHONE ENCOUNTER
From: Jacky Jenkins  To: Maggy Gallegos DO  Sent: 3/31/2019 6:16 PM EDT  Subject: Prescription Question    Good morning Dr. Cindy Tejada,     I requested 1mg Coumadin script last week, which was sent to [facility]. My insurance will not cover it as I have 5mg Coumadin on file. It was $49.10, so I declined to pick it up. I called [facility] and they said because I have 5mg Coumadin, they need YOUR authorization on the 1mg Coumadin script before they will pay for it. Would you please send a new script for 1mg Coumadin, 90 days/3 daily to [address] [place] [address] Pharmacy\" for me ? Thank you very much!   Laurita Jung

## 2019-04-02 ENCOUNTER — HOSPITAL ENCOUNTER (OUTPATIENT)
Dept: LAB | Age: 69
Discharge: HOME | End: 2019-04-02
Payer: MEDICARE

## 2019-04-02 DIAGNOSIS — E87.6 HYPOKALEMIA: ICD-10-CM

## 2019-04-02 DIAGNOSIS — E83.42 HYPOMAGNESEMIA: ICD-10-CM

## 2019-04-02 LAB
ANION GAP SERPL CALCULATED.3IONS-SCNC: 9 MMOL/L (ref 9–17)
BUN BLDV-MCNC: 14 MG/DL (ref 8–23)
BUN/CREAT BLD: 20 (ref 9–20)
CALCIUM SERPL-MCNC: 9.2 MG/DL (ref 8.6–10.4)
CHLORIDE BLD-SCNC: 100 MMOL/L (ref 98–107)
CO2: 31 MMOL/L (ref 20–31)
CREAT SERPL-MCNC: 0.71 MG/DL (ref 0.5–0.9)
GFR AFRICAN AMERICAN: >60 ML/MIN
GFR NON-AFRICAN AMERICAN: >60 ML/MIN
GFR SERPL CREATININE-BSD FRML MDRD: NORMAL ML/MIN/{1.73_M2}
GFR SERPL CREATININE-BSD FRML MDRD: NORMAL ML/MIN/{1.73_M2}
GLUCOSE BLD-MCNC: 94 MG/DL (ref 70–99)
MAGNESIUM: 2 MG/DL (ref 1.6–2.6)
POTASSIUM SERPL-SCNC: 4.3 MMOL/L (ref 3.7–5.3)
SODIUM BLD-SCNC: 140 MMOL/L (ref 135–144)

## 2019-04-02 PROCEDURE — 83735 ASSAY OF MAGNESIUM: CPT

## 2019-04-02 PROCEDURE — 36415 COLL VENOUS BLD VENIPUNCTURE: CPT

## 2019-04-02 PROCEDURE — 80048 BASIC METABOLIC PNL TOTAL CA: CPT

## 2019-04-11 LAB
INR BLD: 3.3
PROTIME: NORMAL SECONDS

## 2019-04-18 LAB
INR BLD: 3.4
PROTIME: NORMAL SECONDS

## 2019-05-06 ENCOUNTER — OFFICE VISIT (OUTPATIENT)
Dept: PRIMARY CARE CLINIC | Age: 69
End: 2019-05-06
Payer: MEDICARE

## 2019-05-06 VITALS
TEMPERATURE: 98 F | BODY MASS INDEX: 23.92 KG/M2 | RESPIRATION RATE: 16 BRPM | HEIGHT: 62 IN | HEART RATE: 64 BPM | DIASTOLIC BLOOD PRESSURE: 68 MMHG | SYSTOLIC BLOOD PRESSURE: 138 MMHG | WEIGHT: 130 LBS | OXYGEN SATURATION: 96 %

## 2019-05-06 DIAGNOSIS — D49.2 NEOPLASM OF UNSPECIFIED BEHAVIOR OF BONE, SOFT TISSUE, AND SKIN: ICD-10-CM

## 2019-05-06 DIAGNOSIS — R22.32 AXILLARY MASS, LEFT: Primary | ICD-10-CM

## 2019-05-06 DIAGNOSIS — I10 ESSENTIAL HYPERTENSION: ICD-10-CM

## 2019-05-06 PROCEDURE — G8420 CALC BMI NORM PARAMETERS: HCPCS | Performed by: FAMILY MEDICINE

## 2019-05-06 PROCEDURE — 1090F PRES/ABSN URINE INCON ASSESS: CPT | Performed by: FAMILY MEDICINE

## 2019-05-06 PROCEDURE — 4040F PNEUMOC VAC/ADMIN/RCVD: CPT | Performed by: FAMILY MEDICINE

## 2019-05-06 PROCEDURE — G8399 PT W/DXA RESULTS DOCUMENT: HCPCS | Performed by: FAMILY MEDICINE

## 2019-05-06 PROCEDURE — 1123F ACP DISCUSS/DSCN MKR DOCD: CPT | Performed by: FAMILY MEDICINE

## 2019-05-06 PROCEDURE — 3017F COLORECTAL CA SCREEN DOC REV: CPT | Performed by: FAMILY MEDICINE

## 2019-05-06 PROCEDURE — 99214 OFFICE O/P EST MOD 30 MIN: CPT | Performed by: FAMILY MEDICINE

## 2019-05-06 PROCEDURE — G8427 DOCREV CUR MEDS BY ELIG CLIN: HCPCS | Performed by: FAMILY MEDICINE

## 2019-05-06 PROCEDURE — 1036F TOBACCO NON-USER: CPT | Performed by: FAMILY MEDICINE

## 2019-05-06 RX ORDER — DILTIAZEM HYDROCHLORIDE 60 MG/1
TABLET, FILM COATED ORAL
Qty: 270 TABLET | Refills: 0 | Status: ON HOLD | OUTPATIENT
Start: 2019-05-06 | End: 2019-10-01 | Stop reason: HOSPADM

## 2019-05-06 RX ORDER — TRIAMTERENE AND HYDROCHLOROTHIAZIDE 37.5; 25 MG/1; MG/1
1 CAPSULE ORAL DAILY
Qty: 30 CAPSULE | Refills: 5 | Status: ON HOLD | OUTPATIENT
Start: 2019-05-06 | End: 2019-10-01 | Stop reason: HOSPADM

## 2019-05-06 ASSESSMENT — ENCOUNTER SYMPTOMS
RESPIRATORY NEGATIVE: 1
GASTROINTESTINAL NEGATIVE: 1
EYES NEGATIVE: 1

## 2019-05-06 ASSESSMENT — PATIENT HEALTH QUESTIONNAIRE - PHQ9
SUM OF ALL RESPONSES TO PHQ QUESTIONS 1-9: 0
2. FEELING DOWN, DEPRESSED OR HOPELESS: 0
SUM OF ALL RESPONSES TO PHQ QUESTIONS 1-9: 0
1. LITTLE INTEREST OR PLEASURE IN DOING THINGS: 0
SUM OF ALL RESPONSES TO PHQ9 QUESTIONS 1 & 2: 0

## 2019-05-06 NOTE — PROGRESS NOTES
2019     Robson Meeks (:  1950) is a 77 y.o. female, here for evaluation of the following medical concerns:    Other   This is a chronic (has had a chronic lump to the left axilla since she had pacemaker placed in 2017. Had normal ultrasound of lesion in 2018, but now lesion has enlarged and is becoming more tender) problem. The current episode started more than 1 year ago. The problem occurs constantly. The problem has been gradually worsening. Pertinent negatives include no chills, fatigue or fever. Associated symptoms comments: Notes that she is getting tenderness to the left shoulder region as well and she believes this to be related to the mass. No breast lesions. No symptoms of illness. Also notes that her blood pressure  Has been running high. In the past had been on dyazide and did tolerate this. Would recommend perhaps trying this as an option for getting her blood pressure under better control. Has taken losartan and lisinopril without any improvement in her blood pressure. .     Did review patient's med list, allergies, social history,pmhx and pshx today as noted in the record. Review of Systems   Constitutional: Negative for chills, fatigue and fever. HENT: Negative. Eyes: Negative. Respiratory: Negative. Cardiovascular: Negative. Gastrointestinal: Negative. Musculoskeletal: Negative. Skin: Negative. Hematological: Positive for adenopathy. Prior to Visit Medications    Medication Sig Taking?  Authorizing Provider   triamterene-hydrochlorothiazide (DYAZIDE) 37.5-25 MG per capsule Take 1 capsule by mouth daily Yes Endy Mary DO   warfarin (COUMADIN) 1 MG tablet TAKE 2 TO 3 TABLETS EVERY DAY AS DIRECTED  BY  INR (rotates with 5 mg) Yes Endy Mary DO   diltiazem (CARDIZEM CD) 240 MG extended release capsule Take 1 capsule by mouth nightly Yes Endy Mary, DO   diltiazem (CARDIZEM CD) 180 MG extended release capsule Take 1 capsule by mouth daily  Patient taking differently: Take 180 mg by mouth every morning (before breakfast)  Yes [facility], DO   warfarin (COUMADIN) 5 MG tablet TAKE 1 TABLET EVERY DAY  OR AS DIRECTED Yes Luz Maria Purvis DO   propafenone (RYTHMOL) 150 MG tablet Take 0.5 tablets by mouth 3 times daily  Patient taking differently: Take 75 mg by mouth 3 times daily as needed  Yes [facility], DO   Handicap Placard MISC by Does not apply route Expires 5 years Yes [facility], DO   acetaminophen (TYLENOL) 500 MG tablet Take 500 mg by mouth 2 times daily as needed for Pain Yes Historical Provider, MD   aspirin 81 MG tablet Take 81 mg by mouth daily Yes Historical Provider, MD   warfarin (COUMADIN) 2 MG tablet 1 po daily as directed Yes [facility], DO   magnesium oxide (MAG-OX) 400 MG tablet Take 400 mg by mouth 3 times daily. Yes Historical Provider, MD   diltiazem (CARDIZEM) 60 MG tablet TAKE 1 TABLET BY MOUTH UP TO THREE TIMES DAILY AS NEEDED FOR A-FIB  [facility], [facility]   valACYclovir (VALTREX) 500 MG tablet 1 tab bid  Luz Maria Purvis DO   clindamycin (CLEOCIN) 300 MG capsule Take 600 mg 1 hr prior to dental procedures  [facility], DO        Social History     Tobacco Use    Smoking status: Former Smoker     Packs/day: 0.25     Years: 20.00     Pack years: 5.00     Types: Cigarettes     Last attempt to quit: 5/15/2009     Years since quittin.9    Smokeless tobacco: Never Used   Substance Use Topics    Alcohol use: No        Vitals:    19 1452   BP: 138/68   Site: Left Upper Arm   Position: Sitting   Cuff Size: Medium Adult   Pulse: 64   Resp: 16   Temp: 98 °F (36.7 °C)   TempSrc: Tympanic   SpO2: 96%   Weight: 130 lb (59 kg)   Height: 5' 2\" (1.575 m)     Estimated body mass index is 23.78 kg/m² as calculated from the following:    Height as of this encounter: 5' 2\" (1.575 m). Weight as of this encounter: 130 lb (59 kg).     Physical Exam Constitutional: She is oriented to person, place, and time. She appears well-developed and well-nourished. No distress. HENT:   Head: Normocephalic and atraumatic. Eyes: Conjunctivae are normal.   Neck: Normal range of motion. Pulmonary/Chest: Effort normal.   Neurological: She is alert and oriented to person, place, and time. Skin: Skin is warm and dry. No rash noted. She is not diaphoretic. No erythema. No pallor. Psychiatric: She has a normal mood and affect. Her behavior is normal. Judgment and thought content normal.   Nursing note and vitals reviewed. Palpable mass to the left axilla, medial aspect of the axilla. Mass is fairly large and firm. Approximately 5 cm in length and 3 cm in width. Mobile. No palpable mass into the lateral breast tissue. ASSESSMENT/PLAN:  Encounter Diagnoses   Name Primary?  Axillary mass, left Yes    Neoplasm of unspecified behavior of bone, soft tissue, and skin      Essential hypertension      Orders Placed This Encounter   Medications    triamterene-hydrochlorothiazide (DYAZIDE) 37.5-25 MG per capsule     Sig: Take 1 capsule by mouth daily     Dispense:  30 capsule     Refill:  5     Orders Placed This Encounter   Procedures    US BREAST COMPLETE LEFT     Standing Status:   Future     Standing Expiration Date:   5/6/2020     Order Specific Question:   Reason for exam:     Answer:   left axillary mass    CARMELLA DIGITAL DIAGNOSTIC W OR WO CAD BILATERAL     Standing Status:   Future     Standing Expiration Date:   7/6/2020     Order Specific Question:   Reason for exam:     Answer:   bilateral diagnostic mammogram, left axillary mass     Will order bilateral mammogram and ultrasound of the left breast for further opinion. Will make further intervention based on testing reports. Will give dyazide as she had taken this before and did well with this medical therapy. Return  if no improvement in symptoms or if any further symptoms arise.       No follow-ups on file. An electronic signature was used to authenticate this note.     --Maggy Gallegos DO on 5/6/2019 at 3:35 PM

## 2019-05-06 NOTE — TELEPHONE ENCOUNTER
Lottie Bell called requesting a refill of the below medication which has been pended for you:     Requested Prescriptions     Pending Prescriptions Disp Refills    diltiazem (CARDIZEM) 60 MG tablet [Pharmacy Med Name: DILTIAZEM 60MG TABLETS] 270 tablet 0     Sig: TAKE 1 TABLET BY MOUTH UP TO THREE TIMES DAILY AS NEEDED FOR A-FIB       Last Appointment Date: 12/6/2018  Next Appointment Date: 6/6/2019    Allergies   Allergen Reactions    Amiodarone Shortness Of Breath     Authoring Clinician or Source System: Kevin Paula    Amoxicillin Anaphylaxis    Beta Adrenergic Blockers Shortness Of Breath    Metoprolol Shortness Of Breath     \"Beta Blockers    Penicillin V Potassium Anaphylaxis    Dronedarone Other (See Comments)     Irregular Heart Beats  Authoring Clinician or Source System: Kyara Schmitt  ([employer])    Epinephrine Other (See Comments)     dental admin gave her afib    Lisinopril Other (See Comments)     Eyes were beating along with her heart beat    Morphine Nausea Only    Statins     Nexium [Esomeprazole Magnesium] Anxiety     Heart flutters more than normal.    Proton Pump Inhibitors Nausea And Vomiting

## 2019-05-10 ENCOUNTER — HOSPITAL ENCOUNTER (OUTPATIENT)
Dept: MAMMOGRAPHY | Age: 69
Discharge: HOME | End: 2019-05-12
Payer: MEDICARE

## 2019-05-10 ENCOUNTER — HOSPITAL ENCOUNTER (OUTPATIENT)
Dept: ULTRASOUND IMAGING | Age: 69
Discharge: HOME | End: 2019-05-12
Payer: MEDICARE

## 2019-05-10 DIAGNOSIS — D49.2 NEOPLASM OF UNSPECIFIED BEHAVIOR OF BONE, SOFT TISSUE, AND SKIN: ICD-10-CM

## 2019-05-10 DIAGNOSIS — R22.32 AXILLARY MASS, LEFT: ICD-10-CM

## 2019-05-10 PROCEDURE — G0279 TOMOSYNTHESIS, MAMMO: HCPCS

## 2019-05-10 PROCEDURE — 76642 ULTRASOUND BREAST LIMITED: CPT

## 2019-05-23 ENCOUNTER — PATIENT MESSAGE (OUTPATIENT)
Dept: FAMILY MEDICINE CLINIC | Age: 69
End: 2019-05-23

## 2019-05-23 NOTE — TELEPHONE ENCOUNTER
From: Nancy Martinez  To: Tato Melgoza DO  Sent: 5/23/2019 12:24 PM EDT  Subject: Non-Urgent Medical Question    Hi Dr. Emile Ortiz,     I have a question. I started taking [facility]" with plant sterols and stanols. My INR has gone up. Could this be the reason? I searched the internet for interactions before taking it and found absolutely nothing and thought since it was OTC that it wouldn't be as strong as a Statin. I have blood work in a couple of weeks and wanted to see if this had any effect on my cholesterol that is always high. Thank you and have a great Holiday Weekend !   Juan C Earl

## 2019-05-31 ENCOUNTER — PATIENT MESSAGE (OUTPATIENT)
Dept: FAMILY MEDICINE CLINIC | Age: 69
End: 2019-05-31

## 2019-05-31 PROBLEM — S39.012A LOW BACK STRAIN: Status: ACTIVE | Noted: 2019-05-31

## 2019-05-31 RX ORDER — METAXALONE 800 MG/1
800 TABLET ORAL 3 TIMES DAILY
Qty: 30 TABLET | Refills: 0 | Status: SHIPPED | OUTPATIENT
Start: 2019-05-31 | End: 2019-06-10

## 2019-05-31 NOTE — TELEPHONE ENCOUNTER
From: Fabiola Davis  To: Jaimee Quispe DO  Sent: 5/31/2019 10:07 AM EDT  Subject: Prescription Question    Good Morning,     I hurt my back last week, went to chiro 2x's and that seems to have made it worse, I am stiff as a board in the lumbar region. Since I am on coumadin, would it be ok to take advil, aleve, etc. just for a couple of days? Or would it be better to take a script, like Skelaxin which I have had before? Tramadol makes me sick as do opiates. If a script is best, please call into [facility]. Thank you!   Mikhail Broussard

## 2019-06-03 ENCOUNTER — HOSPITAL ENCOUNTER (OUTPATIENT)
Dept: LAB | Age: 69
Discharge: HOME | End: 2019-06-03
Payer: MEDICARE

## 2019-06-03 DIAGNOSIS — R73.01 IMPAIRED FASTING GLUCOSE: ICD-10-CM

## 2019-06-03 DIAGNOSIS — I10 ESSENTIAL HYPERTENSION: ICD-10-CM

## 2019-06-03 DIAGNOSIS — E78.2 MIXED HYPERLIPIDEMIA: ICD-10-CM

## 2019-06-03 DIAGNOSIS — E83.42 HYPOMAGNESEMIA: ICD-10-CM

## 2019-06-03 LAB
ABSOLUTE EOS #: 0.1 K/UL (ref 0–0.4)
ABSOLUTE IMMATURE GRANULOCYTE: NORMAL K/UL (ref 0–0.3)
ABSOLUTE LYMPH #: 1.4 K/UL (ref 1–4.8)
ABSOLUTE MONO #: 0.7 K/UL (ref 0.1–1.2)
ALBUMIN SERPL-MCNC: 4.7 G/DL (ref 3.5–5.2)
ALBUMIN/GLOBULIN RATIO: 1.3 (ref 1–2.5)
ALP BLD-CCNC: 81 U/L (ref 35–104)
ALT SERPL-CCNC: 14 U/L (ref 5–33)
ANION GAP SERPL CALCULATED.3IONS-SCNC: 11 MMOL/L (ref 9–17)
AST SERPL-CCNC: 19 U/L
BASOPHILS # BLD: 1 % (ref 0–1)
BASOPHILS ABSOLUTE: 0 K/UL (ref 0–0.2)
BILIRUB SERPL-MCNC: 0.33 MG/DL (ref 0.3–1.2)
BUN BLDV-MCNC: 9 MG/DL (ref 8–23)
BUN/CREAT BLD: 14 (ref 9–20)
CALCIUM SERPL-MCNC: 10 MG/DL (ref 8.6–10.4)
CHLORIDE BLD-SCNC: 100 MMOL/L (ref 98–107)
CHOLESTEROL/HDL RATIO: 3.6
CHOLESTEROL: 194 MG/DL
CO2: 28 MMOL/L (ref 20–31)
CREAT SERPL-MCNC: 0.63 MG/DL (ref 0.5–0.9)
DIFFERENTIAL TYPE: NORMAL
EOSINOPHILS RELATIVE PERCENT: 1 % (ref 1–7)
ESTIMATED AVERAGE GLUCOSE: 111 MG/DL
GFR AFRICAN AMERICAN: >60 ML/MIN
GFR NON-AFRICAN AMERICAN: >60 ML/MIN
GFR SERPL CREATININE-BSD FRML MDRD: ABNORMAL ML/MIN/{1.73_M2}
GFR SERPL CREATININE-BSD FRML MDRD: ABNORMAL ML/MIN/{1.73_M2}
GLUCOSE BLD-MCNC: 108 MG/DL (ref 70–99)
HBA1C MFR BLD: 5.5 % (ref 4.8–5.9)
HCT VFR BLD CALC: 42 % (ref 36–46)
HDLC SERPL-MCNC: 54 MG/DL
HEMOGLOBIN: 14.1 G/DL (ref 12–16)
IMMATURE GRANULOCYTES: NORMAL %
LDL CHOLESTEROL: 116 MG/DL (ref 0–130)
LYMPHOCYTES # BLD: 21 % (ref 16–46)
MAGNESIUM: 2 MG/DL (ref 1.6–2.6)
MCH RBC QN AUTO: 31.8 PG (ref 26–34)
MCHC RBC AUTO-ENTMCNC: 33.5 G/DL (ref 31–37)
MCV RBC AUTO: 94.8 FL (ref 80–100)
MONOCYTES # BLD: 10 % (ref 4–11)
NRBC AUTOMATED: NORMAL PER 100 WBC
PDW BLD-RTO: 12.9 % (ref 11–14.5)
PLATELET # BLD: 374 K/UL (ref 140–450)
PLATELET ESTIMATE: NORMAL
PMV BLD AUTO: 7.5 FL (ref 6–12)
POTASSIUM SERPL-SCNC: 4.4 MMOL/L (ref 3.7–5.3)
RBC # BLD: 4.43 M/UL (ref 4–5.2)
RBC # BLD: NORMAL 10*6/UL
SEG NEUTROPHILS: 67 % (ref 43–77)
SEGMENTED NEUTROPHILS ABSOLUTE COUNT: 4.5 K/UL (ref 1.8–7.7)
SODIUM BLD-SCNC: 139 MMOL/L (ref 135–144)
TOTAL PROTEIN: 8.3 G/DL (ref 6.4–8.3)
TRIGL SERPL-MCNC: 118 MG/DL
TSH SERPL DL<=0.05 MIU/L-ACNC: 2.17 MIU/L (ref 0.3–5)
VLDLC SERPL CALC-MCNC: NORMAL MG/DL (ref 1–30)
WBC # BLD: 6.7 K/UL (ref 3.5–11)
WBC # BLD: NORMAL 10*3/UL

## 2019-06-03 PROCEDURE — 84443 ASSAY THYROID STIM HORMONE: CPT

## 2019-06-03 PROCEDURE — 83036 HEMOGLOBIN GLYCOSYLATED A1C: CPT

## 2019-06-03 PROCEDURE — 85025 COMPLETE CBC W/AUTO DIFF WBC: CPT

## 2019-06-03 PROCEDURE — 80053 COMPREHEN METABOLIC PANEL: CPT

## 2019-06-03 PROCEDURE — 80061 LIPID PANEL: CPT

## 2019-06-03 PROCEDURE — 83735 ASSAY OF MAGNESIUM: CPT

## 2019-06-03 PROCEDURE — 36415 COLL VENOUS BLD VENIPUNCTURE: CPT

## 2019-06-06 ENCOUNTER — TELEPHONE (OUTPATIENT)
Dept: FAMILY MEDICINE CLINIC | Age: 69
End: 2019-06-06

## 2019-06-06 ENCOUNTER — OFFICE VISIT (OUTPATIENT)
Dept: FAMILY MEDICINE CLINIC | Age: 69
End: 2019-06-06
Payer: MEDICARE

## 2019-06-06 ENCOUNTER — HOSPITAL ENCOUNTER (OUTPATIENT)
Dept: GENERAL RADIOLOGY | Age: 69
Discharge: HOME | End: 2019-06-08
Payer: MEDICARE

## 2019-06-06 VITALS
HEIGHT: 62 IN | RESPIRATION RATE: 16 BRPM | DIASTOLIC BLOOD PRESSURE: 64 MMHG | BODY MASS INDEX: 23.74 KG/M2 | WEIGHT: 129 LBS | HEART RATE: 64 BPM | SYSTOLIC BLOOD PRESSURE: 146 MMHG

## 2019-06-06 DIAGNOSIS — R73.01 IMPAIRED FASTING GLUCOSE: ICD-10-CM

## 2019-06-06 DIAGNOSIS — Z00.00 MEDICARE ANNUAL WELLNESS VISIT, SUBSEQUENT: ICD-10-CM

## 2019-06-06 DIAGNOSIS — M79.671 RIGHT FOOT PAIN: ICD-10-CM

## 2019-06-06 DIAGNOSIS — E83.42 HYPOMAGNESEMIA: ICD-10-CM

## 2019-06-06 DIAGNOSIS — E78.2 MIXED HYPERLIPIDEMIA: ICD-10-CM

## 2019-06-06 DIAGNOSIS — M54.42 ACUTE BILATERAL LOW BACK PAIN WITH BILATERAL SCIATICA: ICD-10-CM

## 2019-06-06 DIAGNOSIS — I48.0 PAROXYSMAL ATRIAL FIBRILLATION (HCC): ICD-10-CM

## 2019-06-06 DIAGNOSIS — M54.41 ACUTE BILATERAL LOW BACK PAIN WITH BILATERAL SCIATICA: ICD-10-CM

## 2019-06-06 DIAGNOSIS — I10 ESSENTIAL HYPERTENSION: Primary | ICD-10-CM

## 2019-06-06 DIAGNOSIS — F41.9 ANXIETY: ICD-10-CM

## 2019-06-06 PROCEDURE — 1036F TOBACCO NON-USER: CPT | Performed by: FAMILY MEDICINE

## 2019-06-06 PROCEDURE — 1123F ACP DISCUSS/DSCN MKR DOCD: CPT | Performed by: FAMILY MEDICINE

## 2019-06-06 PROCEDURE — G8427 DOCREV CUR MEDS BY ELIG CLIN: HCPCS | Performed by: FAMILY MEDICINE

## 2019-06-06 PROCEDURE — 3017F COLORECTAL CA SCREEN DOC REV: CPT | Performed by: FAMILY MEDICINE

## 2019-06-06 PROCEDURE — 99214 OFFICE O/P EST MOD 30 MIN: CPT | Performed by: FAMILY MEDICINE

## 2019-06-06 PROCEDURE — G0439 PPPS, SUBSEQ VISIT: HCPCS | Performed by: FAMILY MEDICINE

## 2019-06-06 PROCEDURE — 1090F PRES/ABSN URINE INCON ASSESS: CPT | Performed by: FAMILY MEDICINE

## 2019-06-06 PROCEDURE — 4040F PNEUMOC VAC/ADMIN/RCVD: CPT | Performed by: FAMILY MEDICINE

## 2019-06-06 PROCEDURE — 73630 X-RAY EXAM OF FOOT: CPT

## 2019-06-06 PROCEDURE — G8420 CALC BMI NORM PARAMETERS: HCPCS | Performed by: FAMILY MEDICINE

## 2019-06-06 PROCEDURE — G8399 PT W/DXA RESULTS DOCUMENT: HCPCS | Performed by: FAMILY MEDICINE

## 2019-06-06 RX ORDER — ALPRAZOLAM 0.25 MG/1
0.25 TABLET ORAL EVERY 8 HOURS PRN
Qty: 90 TABLET | Refills: 2 | Status: SHIPPED | OUTPATIENT
Start: 2019-06-06 | End: 2019-10-02 | Stop reason: SDUPTHER

## 2019-06-06 RX ORDER — DILTIAZEM HYDROCHLORIDE 180 MG/1
180 CAPSULE, COATED, EXTENDED RELEASE ORAL DAILY
Qty: 90 CAPSULE | Refills: 1 | Status: ON HOLD | OUTPATIENT
Start: 2019-06-06 | End: 2019-10-01 | Stop reason: HOSPADM

## 2019-06-06 RX ORDER — CLINDAMYCIN HYDROCHLORIDE 300 MG/1
CAPSULE ORAL
Qty: 6 CAPSULE | Refills: 1 | Status: SHIPPED | OUTPATIENT
Start: 2019-06-06 | End: 2020-03-17 | Stop reason: SDUPTHER

## 2019-06-06 ASSESSMENT — LIFESTYLE VARIABLES: HOW OFTEN DO YOU HAVE A DRINK CONTAINING ALCOHOL: 0

## 2019-06-06 ASSESSMENT — ANXIETY QUESTIONNAIRES: GAD7 TOTAL SCORE: 0

## 2019-06-06 ASSESSMENT — PATIENT HEALTH QUESTIONNAIRE - PHQ9
SUM OF ALL RESPONSES TO PHQ QUESTIONS 1-9: 0
SUM OF ALL RESPONSES TO PHQ QUESTIONS 1-9: 0

## 2019-06-06 NOTE — PATIENT INSTRUCTIONS
Hospital Outpatient Visit on 06/03/2019   Component Date Value Ref Range Status    Magnesium 06/03/2019 2.0  1.6 - 2.6 mg/dL Final    TSH 06/03/2019 2.17  0.30 - 5.00 mIU/L Final    Hemoglobin A1C 06/03/2019 5.5  4.8 - 5.9 % Final    Estimated Avg Glucose 06/03/2019 111  mg/dL Final    Cholesterol 06/03/2019 194  <200 mg/dL Final    Comment:    Cholesterol Guidelines:      <200  Desirable   200-240  Borderline      >240  Undesirable         HDL 06/03/2019 54  >40 mg/dL Final    Comment:    HDL Guidelines:    <40     Undesirable   40-59    Borderline    >59     Desirable         LDL Cholesterol 06/03/2019 116  0 - 130 mg/dL Final    Comment:    LDL Guidelines:     <100    Desirable   100-129   Near to/above Desirable   130-159   Borderline      >159   Undesirable     Direct (measured) LDL and calculated LDL are not interchangeable tests.  Chol/HDL Ratio 06/03/2019 3.6  <5 Final            Triglycerides 06/03/2019 118  <150 mg/dL Final    Comment:    Triglyceride Guidelines:     <150   Desirable   150-199  Borderline   200-499  High     >499   Very high   Based on AHA Guidelines for fasting triglyceride, October 2012.          VLDL 06/03/2019 NOT REPORTED  1 - 30 mg/dL Final    Glucose 06/03/2019 108* 70 - 99 mg/dL Final    BUN 06/03/2019 9  8 - 23 mg/dL Final    CREATININE 06/03/2019 0.63  0.50 - 0.90 mg/dL Final    Bun/Cre Ratio 06/03/2019 14  9 - 20 Final    Calcium 06/03/2019 10.0  8.6 - 10.4 mg/dL Final    Sodium 06/03/2019 139  135 - 144 mmol/L Final    Potassium 06/03/2019 4.4  3.7 - 5.3 mmol/L Final    Chloride 06/03/2019 100  98 - 107 mmol/L Final    CO2 06/03/2019 28  20 - 31 mmol/L Final    Anion Gap 06/03/2019 11  9 - 17 mmol/L Final    Alkaline Phosphatase 06/03/2019 81  35 - 104 U/L Final    ALT 06/03/2019 14  5 - 33 U/L Final    AST 06/03/2019 19  <32 U/L Final    Total Bilirubin 06/03/2019 0.33  0.3 - 1.2 mg/dL Final    Total Protein 06/03/2019 8.3  6.4 - 8.3 g/dL Final    Alb 06/03/2019 4.7  3.5 - 5.2 g/dL Final    Albumin/Globulin Ratio 06/03/2019 1.3  1.0 - 2.5 Final    GFR Non- 06/03/2019 >60  >60 mL/min Final    GFR  06/03/2019 >60  >60 mL/min Final    GFR Comment 06/03/2019        Final    Comment: Average GFR for 61-76 years old:   80 mL/min/1.73sq m  Chronic Kidney Disease:   <60 mL/min/1.73sq m  Kidney failure:   <15 mL/min/1.73sq m              eGFR calculated using average adult body mass.  Additional eGFR calculator available at:        [email or web address]            GFR Staging 06/03/2019 NOT REPORTED   Final    WBC 06/03/2019 6.7  3.5 - 11.0 k/uL Final    RBC 06/03/2019 4.43  4.0 - 5.2 m/uL Final    Hemoglobin 06/03/2019 14.1  12.0 - 16.0 g/dL Final    Hematocrit 06/03/2019 42.0  36 - 46 % Final    MCV 06/03/2019 94.8  80 - 100 fL Final    MCH 06/03/2019 31.8  26 - 34 pg Final    MCHC 06/03/2019 33.5  31 - 37 g/dL Final    RDW 06/03/2019 12.9  11.0 - 14.5 % Final    Platelets 87/78/4094 374  140 - 450 k/uL Final    MPV 06/03/2019 7.5  6.0 - 12.0 fL Final    NRBC Automated 06/03/2019 NOT REPORTED  per 100 WBC Final    Differential Type 06/03/2019 NOT REPORTED   Final    Immature Granulocytes 06/03/2019 NOT REPORTED  0 % Final    Absolute Immature Granulocyte 06/03/2019 NOT REPORTED  0.00 - 0.30 k/uL Final    WBC Morphology 06/03/2019 NOT REPORTED   Final    RBC Morphology 06/03/2019 NOT REPORTED   Final    Platelet Estimate 84/04/7697 NOT REPORTED   Final    Seg Neutrophils 06/03/2019 67  43 - 77 % Final    Lymphocytes 06/03/2019 21  16 - 46 % Final    Monocytes 06/03/2019 10  4 - 11 % Final    Eosinophils % 06/03/2019 1  1 - 7 % Final    Basophils 06/03/2019 1  0 - 1 % Final    Segs Absolute 06/03/2019 4.50  1.8 - 7.7 k/uL Final    Absolute Lymph # 06/03/2019 1.40  1.0 - 4.8 k/uL Final    Absolute Mono # 06/03/2019 0.70  0.1 - 1.2 k/uL Final    Absolute Eos # 06/03/2019 0.10  0.0 -

## 2019-06-06 NOTE — PROGRESS NOTES
Medicare Annual Wellness Visit  Name: Win Chairez Date: 2019   MRN: [id number] Sex: Female   Age: 77 y.o. Ethnicity: Non-/Non    : 1950 Race: Ronen Smith is here for Medicare AWV (subsequent; last 17); Hypertension (6 mo f/u); Hyperlipidemia (6 mo f/u); Blood Sugar Problem (IFG; 6 mo f/u); Anxiety (6 mo f/u); Atrial Fibrillation (6 mo f/u); Discuss Labs (drawn 6/3/19); Lower Back Pain (really bothering-saw chiropractor then couldn't get off the table); Foot Pain (right foot-ran over it with sweeper on 19); and Shoulder Pain (left shoulder)    Screenings for behavioral, psychosocial and functional/safety risks, and cognitive dysfunction are all negative except as indicated below. These results, as well as other patient data from the [email or web address] form, are documented in Flowsheets linked to this Encounter. Allergies   Allergen Reactions    Amiodarone Shortness Of Breath     Authoring Clinician or Source System: Suma Lobstein    Amoxicillin Anaphylaxis    Beta Adrenergic Blockers Shortness Of Breath    Metoprolol Shortness Of Breath     \"Beta Blockers    Penicillin V Potassium Anaphylaxis    Dronedarone Other (See Comments)     Irregular Heart Beats  Authoring Clinician or Source System: Thom Schmitt  ([employer])    Epinephrine Other (See Comments)     dental admin gave her afib    Lisinopril Other (See Comments)     Eyes were beating along with her heart beat    Morphine Nausea Only    Statins     Nexium [Esomeprazole Magnesium] Anxiety     Heart flutters more than normal.    Proton Pump Inhibitors Nausea And Vomiting     Prior to Visit Medications    Medication Sig Taking?  Authorizing Provider   diltiazem (CARDIZEM CD) 180 MG extended release capsule Take 1 capsule by mouth daily Yes Ivory Da Silva DO   clindamycin (CLEOCIN) 300 MG capsule Take 600 mg 1 hr prior to dental procedures Yes Ivory Da Silva DO   ALPRAZolam (XANAX) 0.25 MG tablet Take 1 tablet by mouth every 8 hours as needed for Anxiety for up to 90 days. Yes Jean Carlos Morley DO   metaxalone (SKELAXIN) 800 MG tablet Take 1 tablet by mouth 3 times daily for 10 days Yes Jean Carlos Morley DO   diltiazem (CARDIZEM) 60 MG tablet TAKE 1 TABLET BY MOUTH UP TO THREE TIMES DAILY AS NEEDED FOR A-FIB Yes Jean Carlos Morley DO   triamterene-hydrochlorothiazide (DYAZIDE) 37.5-25 MG per capsule Take 1 capsule by mouth daily Yes Jean Carlos Morley DO   warfarin (COUMADIN) 1 MG tablet TAKE 2 TO 3 TABLETS EVERY DAY AS DIRECTED  BY  INR (rotates with 5 mg) Yes Jean Carlos Morley DO   diltiazem (CARDIZEM CD) 240 MG extended release capsule Take 1 capsule by mouth nightly Yes Jean Carlos Morley DO   warfarin (COUMADIN) 5 MG tablet TAKE 1 TABLET EVERY DAY  OR AS DIRECTED Yes Luz Maria Purvis DO   propafenone (RYTHMOL) 150 MG tablet Take 0.5 tablets by mouth 3 times daily  Patient taking differently: Take 75 mg by mouth 3 times daily as needed  Yes Jean Carlos Morley DO   Handicap Placard MISC by Does not apply route Expires 5 years Yes Jean Carlos Morley DO   acetaminophen (TYLENOL) 500 MG tablet Take 500 mg by mouth 2 times daily as needed for Pain Yes Historical Provider, MD   aspirin 81 MG tablet Take 81 mg by mouth daily Yes Historical Provider, MD   warfarin (COUMADIN) 2 MG tablet 1 po daily as directed Yes Jean Carlos Morley DO   magnesium oxide (MAG-OX) 400 MG tablet Take 400 mg by mouth 3 times daily. Yes Historical Provider, MD   valACYclovir (VALTREX) 500 MG tablet 1 tab bid  Jean Carlos Morley DO     Past Medical History:   Diagnosis Date    Allergic rhinitis     Anxiety     Atrial fibrillation ([address]     Heart murmur     Valvuloplasty 6/2012    Histoplasmosis     By xray criteria.     History of cardioversion 10 times total: last Dec 2012    Hyperlipidemia     Hypertension     Hypokalemia     Hyponatremia     Impaired fasting glucose     Mass     Benign of neck/chin, status postop,now resolved. 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 Jonah Miss    Dr. Johanny Pryor     Past Surgical History:   Procedure Laterality Date    CARDIAC PACEMAKER PLACEMENT  12/12/2017    dual chamber pacemaker primary implant and primary lead insertion done at [facility] Dr Inez Nelson  June 19,2012    For rheumatic mitral stenosis.  CARDIOVERSION  10 times in past, last 12/12    CYST REMOVAL      Neck    DILATION AND CURETTAGE OF UTERUS  April 13, 1978    Dr. Eliezer Swift OTHER SURGICAL HISTORY  Jan. 2013    Cardiac ablation    PACEMAKER INSERTION Left 12/2017    SALPINGECTOMY Bilateral July 8,1977    Laparoscopic partial(elective sterilization. )The mass was an epidermal inclusion cyst that was ruptured/inflamed.  TUBAL LIGATION      VENTRICULAR ABLATION SURGERY  1/8/2015    cardiac ablation; [facility]; for SVT    WRIST GANGLION EXCISION Right April 24,2002    Rafa     Family History   Problem Relation Age of Onset    Other Mother         Total abdominal hysterectomy.     Kidney Disease Mother     Hypertension Mother     High Blood Pressure Mother     High Cholesterol Mother     Hypertension Father     Other Father         Prostate disease    Cancer Father     High Blood Pressure Father     Heart Defect Sister         Murmur    High Blood Pressure Sister     Allergies Daughter     Allergies Daughter     Hypertension Brother     High Blood Pressure Brother     Hypertension Sister        CareTeam (Including outside providers/suppliers regularly involved in providing care):   Patient Care Team:  Hakeem Quispe DO as PCP - General (Family Medicine)  Hakeem Quispe DO as PCP - [facility] Empaneled Provider    Wt Readings from Last 3 Encounters:   06/06/19 129 lb (58.5 kg)   05/06/19 130 lb (59 kg)   12/06/18 126 lb (57.2 kg)     Vitals: 06/06/19 1312 06/06/19 1314   BP: (!) 150/64 (!) 146/64   Site: Left Upper Arm Left Upper Arm   Position: Sitting Sitting   Cuff Size: Medium Adult Medium Adult   Pulse: 64    Resp: 16    Weight: 129 lb (58.5 kg)    Height: 5' 2\" (1.575 m)      Body mass index is 23.59 kg/m². Based upon direct observation of the patient, evaluation of cognition reveals recent and remote memory intact. Patient's complete Health Risk Assessment and screening values have been reviewed and are found in Flowsheets. The following problems were reviewed today and where indicated follow up appointments were made and/or referrals ordered. Positive Risk Factor Screenings with Interventions:     General Health:  General  In general, how would you say your health is?: Very Good  In the past 7 days, have you experienced any of the following? New or Increased Pain, New or Increased Fatigue, Loneliness, Social Isolation, Stress or Anger?: (!) New or Increased Pain  Do you get the social and emotional support that you need?: Yes  Do you have a Living Will?: Yes  General Health Risk Interventions:  · Pain issues: discussed right foot pain, low back pain and left shoulder pain.   Have a plan for further workup    Hearing/Vision:  Hearing/Vision  Do you or your family notice any trouble with your hearing?: No  Do you have difficulty driving, watching TV, or doing any of your daily activities because of your eyesight?: No  Have you had an eye exam within the past year?: (!) No  Hearing/Vision Interventions:  · Vision concerns:  patient encouraged to make appointment with his/her eye specialist    Safety:  Safety  Do you have working smoke detectors?: Yes  Have all throw rugs been removed or fastened?: (!) No  Do you have non-slip mats in all bathtubs?: (!) No  Do all of your stairways have a railing or banister?: Yes  Are your doorways, halls and stairs free of clutter?: Yes  Do you always fasten your seatbelt when you are in a car?: Yes  Safety Interventions:  · Home safety tips provided    Personalized Preventive Plan   Current Health Maintenance Status  Immunization History   Administered Date(s) Administered    Tetanus 04/04/2000        Health Maintenance   Topic Date Due    DTaP/Tdap/Td vaccine (1 - Tdap) Recommended, patient declined      Shingles Vaccine (1 of 2) Recommended, patient declined      Pneumococcal 65+ years Vaccine (1 of 2 - PCV13) Recommended, patient declined      Flu vaccine (Season Ended) 09/01/2019    A1C test (Diabetic or Prediabetic)  06/03/2020    Potassium monitoring  06/03/2020    Creatinine monitoring  06/03/2020    Colon cancer screen fecal DNA test (Cologuard)  08/11/2020    Breast cancer screen  05/10/2021    Lipid screen  06/03/2024    DEXA (modify frequency per FRAX score)  Addressed    Hepatitis C screen  Discontinued     Recommendations for Preventive Services Due: see orders and patient instructions/AVS.  .   Recommended screening schedule for the next 5-10 years is provided to the patient in written form: see Patient Instructions/AVS.

## 2019-06-07 ENCOUNTER — PATIENT MESSAGE (OUTPATIENT)
Dept: FAMILY MEDICINE CLINIC | Age: 69
End: 2019-06-07

## 2019-06-07 DIAGNOSIS — S92.324A CLOSED NONDISPLACED FRACTURE OF SECOND METATARSAL BONE OF RIGHT FOOT, INITIAL ENCOUNTER: Primary | ICD-10-CM

## 2019-06-07 NOTE — TELEPHONE ENCOUNTER
From: Jean-Pierre Wade  To: Milagros Chicas DO  Sent: 6/7/2019 2:19 PM EDT  Subject: Visit Ricky Hinson,     I saw the XR results on my foot, and wonder if you can explain 'sclerosis' for me. Also, my foot, which is getting more swollen over time, and now my leg below the knee was swollen last night. Today the pain from my foot is radiating up next to my shin bone and my calf muscle in back feels tight almost like a cramp. I feel like I am walking around with and elephant stump for a leg. Curious as to why it keeps getting bigger. Thank you !   ELY

## 2019-06-09 ASSESSMENT — ENCOUNTER SYMPTOMS
COUGH: 0
EYE DISCHARGE: 0
SORE THROAT: 0
DIARRHEA: 0
RHINORRHEA: 0
WHEEZING: 0
CONSTIPATION: 0
BACK PAIN: 1
NAUSEA: 0
TROUBLE SWALLOWING: 0
SINUS PRESSURE: 0
VOMITING: 0
SHORTNESS OF BREATH: 0
ABDOMINAL PAIN: 0
EYE REDNESS: 0

## 2019-06-10 NOTE — PROGRESS NOTES
2019     Candice Gregory (:  1950) is a 77 y.o. female, here for evaluation of the following medical concerns:    HPI  Patient comes in today for follow up of chronic health issues   Chief Complaint   Patient presents with    Medicare AWV     subsequent; last 17    Hypertension     6 mo f/u    Hyperlipidemia     6 mo f/u    Blood Sugar Problem     IFG; 6 mo f/u    Anxiety     6 mo f/u    Atrial Fibrillation     6 mo f/u    Discuss Labs     drawn 6/3/19    Lower Back Pain     really bothering-saw chiropractor then couldn't get off the table    Foot Pain     right foot-ran over it with sweeper on 19    Shoulder Pain     left shoulder   . Patient did note that she ran a vacuum  over her right foot on May 18. Since that time she has had pain to the mid forefoot with swelling. She doesn't think that she broke it but is still having ongoing pain issues. Also notes that she has been having some pain to the lower back region. She is seen a chiropractor and after second treatment by him could hardly get off of the table. Has been using some topical muscle cream to the area which has helped slightly but she still has pain. Unable to tolerate oral steroids so this is not an option for her. I did suggest perhaps trying topical Voltaren gel as this may provide her with some benefit without interfering with her Coumadin therapy. She is aware that she should just use this sparingly. I did make her aware that this can be absorbed systemic Anise Laming she should just use the lowest effective dose in order to control her pain. Has a known history of hypertension which is stable and controlled on her current medical therapy. Has a known history of hyperlipidemia and her cholesterol levels have significantly improved. She's been unable to tolerate prescription medication but did find that plants they're all movement has actually helped with her cholesterol.   Did discuss with her continuing to follow a lipid lowering diet keep this optimally controlled. Has a known history of impaired fasting glucose and her blood sugar level is just slightly elevated. Did discuss low-carb/low sugar diet and routine exercise in order to keep this optimally controlled. Has a known history of anxiety which is stable and controlled on her current medical therapy. As a known history of chronic atrial fibrillation and is planning to go see the specialist at [facility] as a second opinion from her [facility] specialist.  She is getting differing opinions from 2 cardiologist at [facility] regarding whether or not she should undergo further surgical intervention to try to help with the atrial fibrillation so she would like another opinion. Has had some ongoing pain to her left shoulder as well. We did do an ultrasound to a nodule under her left axilla that looked to be more of a benign lymph node. She still has an ache to the area. Her magnesium levels are adequately supplemented on her current magnesium supplemental dose. Otherwise no other acute medical concerns. .  Patient's recent lab reports are as follows:    Results for orders placed or performed during the hospital encounter of 06/03/19   Magnesium   Result Value Ref Range    Magnesium 2.0 1.6 - 2.6 mg/dL   TSH without Reflex   Result Value Ref Range    TSH 2.17 0.30 - 5.00 mIU/L   Hemoglobin A1C   Result Value Ref Range    Hemoglobin A1C 5.5 4.8 - 5.9 %    Estimated Avg Glucose 111 mg/dL   Lipid Panel   Result Value Ref Range    Cholesterol 194 <200 mg/dL    HDL 54 >40 mg/dL    LDL Cholesterol 116 0 - 130 mg/dL    Chol/HDL Ratio 3.6 <5    Triglycerides 118 <150 mg/dL    VLDL NOT REPORTED 1 - 30 mg/dL   Comprehensive Metabolic Panel   Result Value Ref Range    Glucose 108 (H) 70 - 99 mg/dL    BUN 9 8 - 23 mg/dL    CREATININE 0.63 0.50 - 0.90 mg/dL    Bun/Cre Ratio 14 9 - 20    Calcium 10.0 8.6 - 10.4 mg/dL    Sodium 139 135 - 144 mmol/L    Potassium 4.4 3.7 - 5.3 mmol/L    Chloride 100 98 - 107 mmol/L    CO2 28 20 - 31 mmol/L    Anion Gap 11 9 - 17 mmol/L    Alkaline Phosphatase 81 35 - 104 U/L    ALT 14 5 - 33 U/L    AST 19 <32 U/L    Total Bilirubin 0.33 0.3 - 1.2 mg/dL    Total Protein 8.3 6.4 - 8.3 g/dL    Alb 4.7 3.5 - 5.2 g/dL    Albumin/Globulin Ratio 1.3 1.0 - 2.5    GFR Non-African American >60 >60 mL/min    GFR African American >60 >60 mL/min    GFR Comment          GFR Staging NOT REPORTED    CBC Auto Differential   Result Value Ref Range    WBC 6.7 3.5 - 11.0 k/uL    RBC 4.43 4.0 - 5.2 m/uL    Hemoglobin 14.1 12.0 - 16.0 g/dL    Hematocrit 42.0 36 - 46 %    MCV 94.8 80 - 100 fL    MCH 31.8 26 - 34 pg    MCHC 33.5 31 - 37 g/dL    RDW 12.9 11.0 - 14.5 %    Platelets 668 326 - 892 k/uL    MPV 7.5 6.0 - 12.0 fL    NRBC Automated NOT REPORTED per 100 WBC    Differential Type NOT REPORTED     Immature Granulocytes NOT REPORTED 0 %    Absolute Immature Granulocyte NOT REPORTED 0.00 - 0.30 k/uL    WBC Morphology NOT REPORTED     RBC Morphology NOT REPORTED     Platelet Estimate NOT REPORTED     Seg Neutrophils 67 43 - 77 %    Lymphocytes 21 16 - 46 %    Monocytes 10 4 - 11 %    Eosinophils % 1 1 - 7 %    Basophils 1 0 - 1 %    Segs Absolute 4.50 1.8 - 7.7 k/uL    Absolute Lymph # 1.40 1.0 - 4.8 k/uL    Absolute Mono # 0.70 0.1 - 1.2 k/uL    Absolute Eos # 0.10 0.0 - 0.4 k/uL    Basophils # 0.00 0.0 - 0.2 k/uL     Did discuss dietary modification and increased exercise to keep cholesterol and blood sugar under good control. Other review of systems are as noted below. Did review patient's med list, allergies, social history, fam history, pmhx and pshx today as noted in the record. Preventative measures are reviewed today. See health maintenance section for complete preventative plan of care. Did review patient's med list, allergies, social history, fam history, pmhx and pshx today as noted in the record.     Preventative measures are reviewed today. See health maintenance section for complete preventative plan of care. Review of Systems   Constitutional: Negative for chills, fatigue and fever. HENT: Negative for congestion, ear pain, postnasal drip, rhinorrhea, sinus pressure, sore throat and trouble swallowing. Eyes: Negative for discharge and redness. Respiratory: Negative for cough, shortness of breath and wheezing. Cardiovascular: Negative for chest pain. Gastrointestinal: Negative for abdominal pain, constipation, diarrhea, nausea and vomiting. Musculoskeletal: Positive for arthralgias, back pain, gait problem, joint swelling and myalgias. Negative for neck pain. Skin: Negative for rash and wound. Allergic/Immunologic: Negative for environmental allergies. Neurological: Negative for dizziness, weakness, light-headedness and headaches. Hematological: Negative for adenopathy. Psychiatric/Behavioral: Negative. Prior to Visit Medications    Medication Sig Taking? Authorizing Provider   diltiazem (CARDIZEM CD) 180 MG extended release capsule Take 1 capsule by mouth daily Yes Mia Deshpande DO   clindamycin (CLEOCIN) 300 MG capsule Take 600 mg 1 hr prior to dental procedures Yes Mia Deshpande DO   ALPRAZolam Colie Baca) 0.25 MG tablet Take 1 tablet by mouth every 8 hours as needed for Anxiety for up to 90 days.  Yes Mia Deshpande DO   diclofenac sodium 1 % GEL Apply 4 g topically 4 times daily as needed for Pain Yes Mia Deshpande DO   metaxalone (SKELAXIN) 800 MG tablet Take 1 tablet by mouth 3 times daily for 10 days Yes Mia Deshpande DO   diltiazem (CARDIZEM) 60 MG tablet TAKE 1 TABLET BY MOUTH UP TO THREE TIMES DAILY AS NEEDED FOR A-FIB Yes Mia Deshpande DO   triamterene-hydrochlorothiazide (DYAZIDE) 37.5-25 MG per capsule Take 1 capsule by mouth daily Yes Mia Deshpande DO   warfarin (COUMADIN) 1 MG tablet TAKE 2 TO 3 TABLETS EVERY DAY AS DIRECTED  BY  INR (rotates with 5 mg) Yes Markus Jorge DO   diltiazem (CARDIZEM CD) 240 MG extended release capsule Take 1 capsule by mouth nightly Yes Markus Jorge DO   warfarin (COUMADIN) 5 MG tablet TAKE 1 TABLET EVERY DAY  OR AS DIRECTED Yes Luz Maria Purvis DO   propafenone (RYTHMOL) 150 MG tablet Take 0.5 tablets by mouth 3 times daily  Patient taking differently: Take 75 mg by mouth 3 times daily as needed  Yes Markus Jorge DO   Handicap Placard MISC by Does not apply route Expires 5 years Yes Markus Jorge DO   acetaminophen (TYLENOL) 500 MG tablet Take 500 mg by mouth 2 times daily as needed for Pain Yes Historical Provider, MD   aspirin 81 MG tablet Take 81 mg by mouth daily Yes Historical Provider, MD   warfarin (COUMADIN) 2 MG tablet 1 po daily as directed Yes Markus Jorge DO   magnesium oxide (MAG-OX) 400 MG tablet Take 400 mg by mouth 3 times daily. Yes Historical Provider, MD   valACYclovir (VALTREX) 500 MG tablet 1 tab bid  Markus Jorge DO        Social History     Tobacco Use    Smoking status: Former Smoker     Packs/day: 0.25     Years: 20.00     Pack years: 5.00     Types: Cigarettes     Last attempt to quit: 5/15/2009     Years since quitting: 10.0    Smokeless tobacco: Never Used   Substance Use Topics    Alcohol use: No        Vitals:    06/06/19 1312 06/06/19 1314   BP: (!) 150/64 (!) 146/64   Site: Left Upper Arm Left Upper Arm   Position: Sitting Sitting   Cuff Size: Medium Adult Medium Adult   Pulse: 64    Resp: 16    Weight: 129 lb (58.5 kg)    Height: 5' 2\" (1.575 m)      Estimated body mass index is 23.59 kg/m² as calculated from the following:    Height as of this encounter: 5' 2\" (1.575 m). Weight as of this encounter: 129 lb (58.5 kg). Physical Exam   Constitutional: She is oriented to person, place, and time. She appears well-developed and well-nourished. No distress. HENT:   Head: Normocephalic and atraumatic.    Right Ear: External ear normal.   Left Ear: External ear normal.   Nose: Nose normal.   Mouth/Throat: Oropharynx is clear and moist. No oropharyngeal exudate. Eyes: Pupils are equal, round, and reactive to light. Conjunctivae and EOM are normal. Right eye exhibits no discharge. Left eye exhibits no discharge. Neck: Normal range of motion. Neck supple. No thyromegaly present. Cardiovascular: Normal rate, regular rhythm and normal heart sounds. Pulmonary/Chest: Effort normal and breath sounds normal. She has no wheezes. She has no rales. Abdominal: Soft. Bowel sounds are normal.   Musculoskeletal: She exhibits edema and tenderness. Swelling to the dorsum of the right foot with pain with palpation to the 2nd, 3rd and 4th metatarsals. Moves the foot and ankle in a normal range of motion. Neurovascularly intact. Increased paravertebral muscular tension and tenderness with palpation to the  lumbosacral spine. Pain is most prominent to the sacroiliac region. Lymphadenopathy:     She has no cervical adenopathy. Neurological: She is alert and oriented to person, place, and time. Skin: Skin is warm and dry. No rash noted. She is not diaphoretic. Psychiatric: She has a normal mood and affect. Her behavior is normal. Judgment and thought content normal.   Nursing note and vitals reviewed. ASSESSMENT/PLAN:  Encounter Diagnoses   Name Primary?     Essential hypertension Yes    Mixed hyperlipidemia     Impaired fasting glucose     Paroxysmal atrial fibrillation (HCC)     Anxiety     Hypokalemia     Hypomagnesemia     Right foot pain     Acute bilateral low back pain with bilateral sciatica     Medicare annual wellness visit, subsequent      Orders Placed This Encounter   Procedures    XR FOOT RIGHT (MIN 3 VIEWS)     Standing Status:   Future     Number of Occurrences:   1     Standing Expiration Date:   6/5/2020     Order Specific Question:   Reason for exam:     Answer:   foot pain    CBC Auto Differential     To be done in 6 months     Standing Status:   Future     Standing Expiration Date:   6/5/2020    Comprehensive Metabolic Panel     To be done in 6 months     Standing Status:   Future     Standing Expiration Date:   6/5/2020    Lipid Panel     To be done in 6 months     Standing Status:   Future     Standing Expiration Date:   6/5/2020     Order Specific Question:   Is Patient Fasting?/# of Hours     Answer:   12 hours    Hemoglobin A1C     To be done in 6 months     Standing Status:   Future     Standing Expiration Date:   6/5/2020    TSH without Reflex     To be done in 6 months     Standing Status:   Future     Standing Expiration Date:   6/5/2020    Magnesium     Standing Status:   Future     Standing Expiration Date:   6/5/2020   [address] [facility] Physical Therapy - [place]     Referral Priority:   Routine     Referral Type:   Eval and Treat     Referral Reason:   Specialty Services Required     Requested Specialty:   Physical Therapy     Number of Visits Requested:   1     Orders Placed This Encounter   Medications    diltiazem (CARDIZEM CD) 180 MG extended release capsule     Sig: Take 1 capsule by mouth daily     Dispense:  90 capsule     Refill:  1    clindamycin (CLEOCIN) 300 MG capsule     Sig: Take 600 mg 1 hr prior to dental procedures     Dispense:  6 capsule     Refill:  1    ALPRAZolam (XANAX) 0.25 MG tablet     Sig: Take 1 tablet by mouth every 8 hours as needed for Anxiety for up to 90 days. Dispense:  90 tablet     Refill:  2    diclofenac sodium 1 % GEL     Sig: Apply 4 g topically 4 times daily as needed for Pain     Dispense:  100 g     Refill:  0     We'll give her diclofenac gel for the low back pain and refer her to physical therapy for further opinion. We'll check an x-ray of her right foot to rule out metatarsal fracture. We will make further intervention once testing report is available. Patient is to continue with the rest for current medical therapy. No additional changes are made at this time.     Patient is to continue to follow a low-carb/low sugar/low fat diet and increase exercise for optimal blood sugar and cholesterol control. Controlled Substance Monitoring:    Acute and Chronic Pain Monitoring:   RX Monitoring 6/6/2019   Attestation -   Periodic Controlled Substance Monitoring Possible medication side effects, risk of tolerance/dependence & alternative treatments discussed. ;No signs of potential drug abuse or diversion identified. Chronic Pain > 80 MEDD Obtained or confirmed \"Medication Contract\" on file. Patient is to return to my office in 6 months duration or sooner if any further problems or symptoms arise. (Please note that portions of this note were completed with a voice recognition program. Efforts were made to edit the dictations but occasionally words are mis-transcribed.)      Return in about 6 months (around 12/6/2019). An electronic signature was used to authenticate this note.     --Endy Mary DO on 6/9/2019 at 9:36 PM

## 2019-06-12 ENCOUNTER — HOSPITAL ENCOUNTER (OUTPATIENT)
Dept: PHYSICAL THERAPY | Age: 69
Setting detail: THERAPIES SERIES
Discharge: HOME | End: 2019-06-12
Payer: MEDICARE

## 2019-06-12 PROCEDURE — 97110 THERAPEUTIC EXERCISES: CPT | Performed by: PHYSICAL THERAPIST

## 2019-06-12 PROCEDURE — 97161 PT EVAL LOW COMPLEX 20 MIN: CPT | Performed by: PHYSICAL THERAPIST

## 2019-06-12 ASSESSMENT — PAIN DESCRIPTION - LOCATION: LOCATION: BACK;BUTTOCKS;LEG

## 2019-06-12 ASSESSMENT — PAIN DESCRIPTION - PAIN TYPE: TYPE: CHRONIC PAIN

## 2019-06-12 ASSESSMENT — PAIN SCALES - GENERAL: PAINLEVEL_OUTOF10: 7

## 2019-06-12 ASSESSMENT — PAIN - FUNCTIONAL ASSESSMENT: PAIN_FUNCTIONAL_ASSESSMENT: PREVENTS OR INTERFERES SOME ACTIVE ACTIVITIES AND ADLS

## 2019-06-12 ASSESSMENT — PAIN DESCRIPTION - DESCRIPTORS: DESCRIPTORS: ACHING;SHARP

## 2019-06-12 ASSESSMENT — PAIN DESCRIPTION - ONSET: ONSET: ON-GOING

## 2019-06-12 ASSESSMENT — PAIN DESCRIPTION - ORIENTATION: ORIENTATION: LEFT

## 2019-06-12 ASSESSMENT — PAIN DESCRIPTION - FREQUENCY: FREQUENCY: CONTINUOUS

## 2019-06-12 ASSESSMENT — PAIN DESCRIPTION - PROGRESSION: CLINICAL_PROGRESSION: GRADUALLY WORSENING

## 2019-06-12 NOTE — PLAN OF CARE
[facility]    [x] [place]  Phone: [phone]  Fax: [phone]      [] [place]  Phone: [phone]  Fax: [phone]        To: Referring Practitioner: Rick Hickey      Patient: Ana Laura Pozo  : 1950   MRN: [id number]  Evaluation Date: 2019      Diagnosis Information:  · Diagnosis: M54.42, M 54.41 B LBP, B sciatica   · Treatment Diagnosis: M54.42 LBP, L sciatica, complicated by 3 level compression FX     Physical Therapy Certification Form  Dear Veto Schultz  The following patient has been evaluated for physical therapy services and for therapy to continue, Medicare requires monthly physician review of the treatment plan. Please review the attached evaluation and/or summary of the patient's plan of care, and verify that you agree therapy should continue by signing the attached document and sending it back to our office. Plan of Care/Treatment to date:  [x] Therapeutic Exercise    [] Modalities:  [] Therapeutic Activity     [] Ultrasound  [] Electrical Stimulation  [] Gait Training      [] Cervical Traction [] Lumbar Traction  [] Neuromuscular Re-education    [] Cold/hotpack [] Iontophoresis   [x] Instruction in HEP     Other:  [] Manual Therapy      [x]      Dry needling       [x] Aquatic Therapy      []           ? Goals:  Short term goals  Time Frame for Short term goals: 1 week  Short term goal 1: Start low impact HEP  Short term goal 2: Consider aquatic ex for spinal unloading    Long term goals  Time Frame for Long term goals : 4 weeks  Long term goal 1: Pain controlled 3/10 with L sciatica resolved  Long term goal 2: Able to sit 45 min for driving needs  Long term goal 3: Able to stand, walk 30 min    Frequency/Duration:19 - 19  # Days per week: [] 1 day # Weeks: [] 1 week [] 5 weeks     [x] 2 days?    [] 2 weeks [] 6 weeks     [] 3 days   [] 3 weeks [] 7 weeks     [] 4 days   [x] 4 weeks [] 8 weeks    Rehab Potential: []

## 2019-06-12 NOTE — PROGRESS NOTES
Physical Therapy  Initial Assessment  Date: 2019  Patient Name: Valeria Coughlin  MRN: [id number]  : 1950     Treatment Diagnosis: M54.42 LBP, L sciatica, complicated by 3 level compression FX    Restrictions  Restrictions/Precautions  Implants present? : Pacemaker    Subjective   General  Chart Reviewed: Yes  Patient assessed for rehabilitation services?: Yes  Additional Pertinent Hx: Pacemaker since . A-fib. T12 - L2 comp Fx   Response To Previous Treatment: Not applicable  Family / Caregiver Present: No  Referring Practitioner: Rylan Hassan  Referral Date : 19  Diagnosis: M54.42, M 54.41 B LBP, B sciatica  Follows Commands: Within Functional Limits  General Comment  Comments: Had chiropractic procedure with severe pain increase. PT Visit Information  PT Insurance Information: Medicare  Subjective  Subjective: Comp Fx's , unable to have kyphoplasty . Having LBP L>R. Has L sciatica as far as the knee. Pain Screening  Patient Currently in Pain: Yes  Pain Assessment  Pain Assessment: 0-10  Pain Level: 7  Patient's Stated Pain Goal: 2  Pain Type: Chronic pain  Pain Location: Back;Buttocks;Leg  Pain Orientation: Left  Pain Radiating Towards: Back to L LE as far as knee. Pain Descriptors: Aching; Sharp  Pain Frequency: Continuous  Pain Onset: On-going  Clinical Progression: Gradually worsening  Functional Pain Assessment: Prevents or interferes some active activities and ADLs  Vital Signs  Patient Currently in Pain: Yes           Orientation  Orientation  Overall Orientation Status: Within Normal Limits    Social/Functional History  Social/Functional History  Lives With: Spouse  Type of Home: House  Home Layout: Two level  ADL Assistance: Independent  Homemaking Assistance: Needs assistance  Ambulation Assistance: Independent  Transfer Assistance: Independent  Active : Yes  Mode of Transportation: Car  Occupation: Retired    Objective     Observation/Palpation  Posture: Minutes 50                 Selena Brito, [address]

## 2019-06-13 ENCOUNTER — TELEPHONE (OUTPATIENT)
Dept: FAMILY MEDICINE CLINIC | Age: 69
End: 2019-06-13

## 2019-06-13 ENCOUNTER — APPOINTMENT (OUTPATIENT)
Dept: PHYSICAL THERAPY | Age: 69
End: 2019-06-13
Payer: MEDICARE

## 2019-06-14 ENCOUNTER — PATIENT MESSAGE (OUTPATIENT)
Dept: FAMILY MEDICINE CLINIC | Age: 69
End: 2019-06-14

## 2019-06-14 NOTE — TELEPHONE ENCOUNTER
Patient is followed by cardiology for INR. Left message for patient to return call confirming cardiology was contacted.

## 2019-06-14 NOTE — TELEPHONE ENCOUNTER
From: Josias Ortega  To: Berry Neville DO  Sent: 6/14/2019 10:14 AM EDT  Subject: Non-Urgent Medical Question    Good Morning Joyce Denney,  Thank you for calling this morning about my INR. I am taking 2000 tylenol for my back and the edu handling my INR doesnt seem to want to reduce it by very much. I eat some sort of greens everyday and that isnt helping. I have been taking coumadin 5mg everyday but thursday and that is 2.5. Yesterday he reduced it to 2.5mg M-Thur-Sat, and 5 the rest of the week. I was taking a multivitamin with Vitamin K in it but there is something else in it that makes my heart go whacky so I quit taking it. I bought just Vitamin K, 83% daily dosage, and he said not to take that. Will you please ask Dr. Gabriella Myrick for a second opinion on what she thinks I should dose coumadin at and whether or not to take the vitamin K? Thanks!   ELY

## 2019-07-10 ENCOUNTER — PATIENT MESSAGE (OUTPATIENT)
Dept: FAMILY MEDICINE CLINIC | Age: 69
End: 2019-07-10

## 2019-07-10 DIAGNOSIS — M54.2 NECK PAIN: ICD-10-CM

## 2019-07-10 DIAGNOSIS — G89.29 CHRONIC LEFT SHOULDER PAIN: Primary | ICD-10-CM

## 2019-07-10 DIAGNOSIS — M25.512 CHRONIC LEFT SHOULDER PAIN: Primary | ICD-10-CM

## 2019-07-10 DIAGNOSIS — M54.6 CHRONIC LEFT-SIDED THORACIC BACK PAIN: ICD-10-CM

## 2019-07-10 DIAGNOSIS — G89.29 CHRONIC LEFT-SIDED THORACIC BACK PAIN: ICD-10-CM

## 2019-07-11 ENCOUNTER — PATIENT MESSAGE (OUTPATIENT)
Dept: FAMILY MEDICINE CLINIC | Age: 69
End: 2019-07-11

## 2019-07-11 ENCOUNTER — HOSPITAL ENCOUNTER (OUTPATIENT)
Dept: GENERAL RADIOLOGY | Age: 69
Discharge: HOME | End: 2019-07-13
Payer: MEDICARE

## 2019-07-11 DIAGNOSIS — M25.512 CHRONIC LEFT SHOULDER PAIN: ICD-10-CM

## 2019-07-11 DIAGNOSIS — G89.29 CHRONIC BILATERAL LOW BACK PAIN, WITH SCIATICA PRESENCE UNSPECIFIED: Primary | ICD-10-CM

## 2019-07-11 DIAGNOSIS — M54.6 CHRONIC LEFT-SIDED THORACIC BACK PAIN: ICD-10-CM

## 2019-07-11 DIAGNOSIS — M54.2 NECK PAIN: ICD-10-CM

## 2019-07-11 DIAGNOSIS — G89.29 CHRONIC BILATERAL LOW BACK PAIN, WITH SCIATICA PRESENCE UNSPECIFIED: ICD-10-CM

## 2019-07-11 DIAGNOSIS — G89.29 CHRONIC LEFT SHOULDER PAIN: ICD-10-CM

## 2019-07-11 DIAGNOSIS — G89.29 CHRONIC LEFT-SIDED THORACIC BACK PAIN: ICD-10-CM

## 2019-07-11 DIAGNOSIS — M54.5 CHRONIC BILATERAL LOW BACK PAIN, WITH SCIATICA PRESENCE UNSPECIFIED: ICD-10-CM

## 2019-07-11 DIAGNOSIS — M54.5 CHRONIC BILATERAL LOW BACK PAIN, WITH SCIATICA PRESENCE UNSPECIFIED: Primary | ICD-10-CM

## 2019-07-11 PROCEDURE — 73030 X-RAY EXAM OF SHOULDER: CPT

## 2019-07-11 PROCEDURE — 72100 X-RAY EXAM L-S SPINE 2/3 VWS: CPT

## 2019-07-11 PROCEDURE — 72072 X-RAY EXAM THORAC SPINE 3VWS: CPT

## 2019-07-11 PROCEDURE — 72040 X-RAY EXAM NECK SPINE 2-3 VW: CPT

## 2019-07-14 ENCOUNTER — PATIENT MESSAGE (OUTPATIENT)
Dept: FAMILY MEDICINE CLINIC | Age: 69
End: 2019-07-14

## 2019-07-18 ENCOUNTER — TELEPHONE (OUTPATIENT)
Dept: FAMILY MEDICINE CLINIC | Age: 69
End: 2019-07-18

## 2019-07-19 ENCOUNTER — PATIENT MESSAGE (OUTPATIENT)
Dept: FAMILY MEDICINE CLINIC | Age: 69
End: 2019-07-19

## 2019-07-19 RX ORDER — CEPHALEXIN 500 MG/1
500 CAPSULE ORAL 3 TIMES DAILY
Qty: 30 CAPSULE | Refills: 0 | Status: SHIPPED | OUTPATIENT
Start: 2019-07-19 | End: 2019-09-06 | Stop reason: SDUPTHER

## 2019-07-29 ENCOUNTER — PATIENT MESSAGE (OUTPATIENT)
Dept: FAMILY MEDICINE CLINIC | Age: 69
End: 2019-07-29

## 2019-07-29 DIAGNOSIS — G89.29 CHRONIC BILATERAL LOW BACK PAIN, WITH SCIATICA PRESENCE UNSPECIFIED: Primary | ICD-10-CM

## 2019-07-29 DIAGNOSIS — M54.5 CHRONIC BILATERAL LOW BACK PAIN, WITH SCIATICA PRESENCE UNSPECIFIED: Primary | ICD-10-CM

## 2019-07-29 RX ORDER — PREDNISONE 1 MG/1
5 TABLET ORAL DAILY
Qty: 10 TABLET | Refills: 0 | Status: SHIPPED | OUTPATIENT
Start: 2019-07-29 | End: 2019-08-08

## 2019-07-31 ENCOUNTER — HOSPITAL ENCOUNTER (OUTPATIENT)
Dept: CT IMAGING | Age: 69
Discharge: HOME | End: 2019-08-02
Payer: MEDICARE

## 2019-07-31 DIAGNOSIS — G89.29 CHRONIC BILATERAL LOW BACK PAIN, WITH SCIATICA PRESENCE UNSPECIFIED: ICD-10-CM

## 2019-07-31 DIAGNOSIS — M54.5 CHRONIC BILATERAL LOW BACK PAIN, WITH SCIATICA PRESENCE UNSPECIFIED: ICD-10-CM

## 2019-07-31 PROCEDURE — 72131 CT LUMBAR SPINE W/O DYE: CPT

## 2019-08-06 NOTE — DISCHARGE SUMMARY
[facility]    [x] [place]  Phone: [phone]  Fax: [phone]      [] [place]  Phone: [phone]  Fax: [phone]    Physical Therapy Discharge Note  Date: 2019        Patient Name:  David Baker    :  1950  MRN: [id number]  Restrictions/Precautions:  Restrictions/Precautions  Implants present? : Pacemaker   Medical/Treatment Diagnosis Information:  · Diagnosis: M54.42, M 54.41 B LBP, B sciatica  · Treatment Diagnosis: M54.42 LBP, L sciatica, complicated by 3 level compression FX  Insurance/Certification information:  PT Insurance Information: Medicare  Physician Information:  Referring Practitioner: Dawson Ganser of care signed (Y/N): y  Visit# / total visits:  1  Pain level:                       ?        Plan:    d/c, only attended 1 visit       Goals:  Short term goals  Time Frame for Short term goals: 1 week  Short term goal 1: Start low impact HEP  Short term goal 2: Consider aquatic ex for spinal unloading    Long term goals  Time Frame for Long term goals : 4 weeks  Long term goal 1: Pain controlled 3/10 with L sciatica resolved  Long term goal 2: Able to sit 45 min for driving needs  Long term goal 3: Able to stand, walk 30 min           Discharge Prognosis: [] Excellent [] Good [] Fair  [x] Poor     Goal Status:  [] Achieved [] Partially Achieved  [x] Not Achieved       Electronically signed by:  Berny Farfan, PT

## 2019-08-08 ENCOUNTER — PATIENT MESSAGE (OUTPATIENT)
Dept: FAMILY MEDICINE CLINIC | Age: 69
End: 2019-08-08

## 2019-08-12 RX ORDER — DILTIAZEM HYDROCHLORIDE 240 MG/1
240 CAPSULE, COATED, EXTENDED RELEASE ORAL NIGHTLY
Qty: 90 CAPSULE | Refills: 1 | Status: ON HOLD | OUTPATIENT
Start: 2019-08-12 | End: 2019-10-01 | Stop reason: HOSPADM

## 2019-08-25 ENCOUNTER — HOSPITAL ENCOUNTER (OUTPATIENT)
Age: 69
Discharge: HOME | End: 2019-08-27
Payer: MEDICARE

## 2019-08-25 ENCOUNTER — OFFICE VISIT (OUTPATIENT)
Dept: PRIMARY CARE CLINIC | Age: 69
End: 2019-08-25
Payer: MEDICARE

## 2019-08-25 ENCOUNTER — HOSPITAL ENCOUNTER (OUTPATIENT)
Dept: GENERAL RADIOLOGY | Age: 69
Discharge: HOME | End: 2019-08-27
Payer: MEDICARE

## 2019-08-25 VITALS
WEIGHT: 125.6 LBS | TEMPERATURE: 98 F | SYSTOLIC BLOOD PRESSURE: 130 MMHG | HEART RATE: 74 BPM | BODY MASS INDEX: 22.97 KG/M2 | OXYGEN SATURATION: 98 % | DIASTOLIC BLOOD PRESSURE: 80 MMHG

## 2019-08-25 DIAGNOSIS — M25.532 WRIST PAIN, ACUTE, LEFT: ICD-10-CM

## 2019-08-25 DIAGNOSIS — M25.532 WRIST PAIN, ACUTE, LEFT: Primary | ICD-10-CM

## 2019-08-25 PROCEDURE — 3017F COLORECTAL CA SCREEN DOC REV: CPT | Performed by: FAMILY MEDICINE

## 2019-08-25 PROCEDURE — 99214 OFFICE O/P EST MOD 30 MIN: CPT | Performed by: FAMILY MEDICINE

## 2019-08-25 PROCEDURE — 1090F PRES/ABSN URINE INCON ASSESS: CPT | Performed by: FAMILY MEDICINE

## 2019-08-25 PROCEDURE — 1036F TOBACCO NON-USER: CPT | Performed by: FAMILY MEDICINE

## 2019-08-25 PROCEDURE — 4040F PNEUMOC VAC/ADMIN/RCVD: CPT | Performed by: FAMILY MEDICINE

## 2019-08-25 PROCEDURE — 1123F ACP DISCUSS/DSCN MKR DOCD: CPT | Performed by: FAMILY MEDICINE

## 2019-08-25 PROCEDURE — G8399 PT W/DXA RESULTS DOCUMENT: HCPCS | Performed by: FAMILY MEDICINE

## 2019-08-25 PROCEDURE — 73110 X-RAY EXAM OF WRIST: CPT

## 2019-08-25 PROCEDURE — G8427 DOCREV CUR MEDS BY ELIG CLIN: HCPCS | Performed by: FAMILY MEDICINE

## 2019-08-25 PROCEDURE — L3908 WHO COCK-UP NONMOLDE PRE OTS: HCPCS | Performed by: FAMILY MEDICINE

## 2019-08-25 PROCEDURE — G8420 CALC BMI NORM PARAMETERS: HCPCS | Performed by: FAMILY MEDICINE

## 2019-08-25 RX ORDER — PREDNISONE 10 MG/1
10 TABLET ORAL DAILY
Qty: 5 TABLET | Refills: 0 | Status: SHIPPED | OUTPATIENT
Start: 2019-08-25 | End: 2019-08-30

## 2019-08-25 ASSESSMENT — PATIENT HEALTH QUESTIONNAIRE - PHQ9
1. LITTLE INTEREST OR PLEASURE IN DOING THINGS: 0
SUM OF ALL RESPONSES TO PHQ QUESTIONS 1-9: 0
2. FEELING DOWN, DEPRESSED OR HOPELESS: 0
SUM OF ALL RESPONSES TO PHQ QUESTIONS 1-9: 0
SUM OF ALL RESPONSES TO PHQ9 QUESTIONS 1 & 2: 0

## 2019-09-02 ENCOUNTER — PATIENT MESSAGE (OUTPATIENT)
Dept: FAMILY MEDICINE CLINIC | Age: 69
End: 2019-09-02

## 2019-09-02 DIAGNOSIS — D72.829 LEUKOCYTOSIS, UNSPECIFIED TYPE: Primary | ICD-10-CM

## 2019-09-05 ENCOUNTER — HOSPITAL ENCOUNTER (OUTPATIENT)
Dept: LAB | Age: 69
Discharge: HOME | End: 2019-09-05
Payer: MEDICARE

## 2019-09-05 DIAGNOSIS — D72.829 LEUKOCYTOSIS, UNSPECIFIED TYPE: ICD-10-CM

## 2019-09-05 LAB
ABSOLUTE EOS #: 0.1 K/UL (ref 0–0.4)
ABSOLUTE IMMATURE GRANULOCYTE: ABNORMAL K/UL (ref 0–0.3)
ABSOLUTE LYMPH #: 1.5 K/UL (ref 1–4.8)
ABSOLUTE MONO #: 0.9 K/UL (ref 0.1–1.2)
BASOPHILS # BLD: 1 % (ref 0–1)
BASOPHILS ABSOLUTE: 0.1 K/UL (ref 0–0.2)
DIFFERENTIAL TYPE: ABNORMAL
EOSINOPHILS RELATIVE PERCENT: 1 % (ref 1–7)
HCT VFR BLD CALC: 40.6 % (ref 36–46)
HEMOGLOBIN: 14 G/DL (ref 12–16)
IMMATURE GRANULOCYTES: ABNORMAL %
LYMPHOCYTES # BLD: 13 % (ref 16–46)
MCH RBC QN AUTO: 32.7 PG (ref 26–34)
MCHC RBC AUTO-ENTMCNC: 34.5 G/DL (ref 31–37)
MCV RBC AUTO: 94.7 FL (ref 80–100)
MONOCYTES # BLD: 8 % (ref 4–11)
NRBC AUTOMATED: ABNORMAL PER 100 WBC
PDW BLD-RTO: 12.7 % (ref 11–14.5)
PLATELET # BLD: 417 K/UL (ref 140–450)
PLATELET ESTIMATE: ABNORMAL
PMV BLD AUTO: 7.2 FL (ref 6–12)
RBC # BLD: 4.28 M/UL (ref 4–5.2)
RBC # BLD: ABNORMAL 10*6/UL
SEG NEUTROPHILS: 77 % (ref 43–77)
SEGMENTED NEUTROPHILS ABSOLUTE COUNT: 9.2 K/UL (ref 1.8–7.7)
WBC # BLD: 11.8 K/UL (ref 3.5–11)
WBC # BLD: ABNORMAL 10*3/UL

## 2019-09-05 PROCEDURE — 85025 COMPLETE CBC W/AUTO DIFF WBC: CPT

## 2019-09-05 PROCEDURE — 36415 COLL VENOUS BLD VENIPUNCTURE: CPT

## 2019-09-06 RX ORDER — CEPHALEXIN 500 MG/1
500 CAPSULE ORAL 3 TIMES DAILY
Qty: 30 CAPSULE | Refills: 0 | Status: SHIPPED | OUTPATIENT
Start: 2019-09-06 | End: 2019-12-13 | Stop reason: ALTCHOICE

## 2019-09-06 NOTE — TELEPHONE ENCOUNTER
Spoke with patient regarding elevated white count on CBC and she states she is having open heart surgery on 9/19/2019 so will wait on surgery referral for biopsy until healed from this. She states the lymph node did respond to the Keflex she was on last time but after finishing it reappeared again. Would like to do another dose prior to surgery. Please send to [facility].

## 2019-09-13 ENCOUNTER — HOSPITAL ENCOUNTER (OUTPATIENT)
Dept: LAB | Age: 69
Discharge: HOME | End: 2019-09-13
Payer: MEDICARE

## 2019-09-13 ENCOUNTER — TELEPHONE (OUTPATIENT)
Dept: FAMILY MEDICINE CLINIC | Age: 69
End: 2019-09-13

## 2019-09-13 DIAGNOSIS — I48.0 PAROXYSMAL ATRIAL FIBRILLATION (HCC): Primary | ICD-10-CM

## 2019-09-13 DIAGNOSIS — I48.0 PAROXYSMAL ATRIAL FIBRILLATION (HCC): ICD-10-CM

## 2019-09-13 LAB
INR BLD: 1.7
PROTHROMBIN TIME: 17 SEC (ref 9.4–11.3)

## 2019-09-13 PROCEDURE — 85610 PROTHROMBIN TIME: CPT

## 2019-09-13 PROCEDURE — 36415 COLL VENOUS BLD VENIPUNCTURE: CPT

## 2019-09-30 ENCOUNTER — APPOINTMENT (OUTPATIENT)
Dept: GENERAL RADIOLOGY | Age: 69
DRG: 389 | End: 2019-09-30
Payer: MEDICARE

## 2019-09-30 ENCOUNTER — HOSPITAL ENCOUNTER (INPATIENT)
Age: 69
LOS: 1 days | Discharge: HOME | DRG: 389 | End: 2019-10-01
Attending: EMERGENCY MEDICINE | Admitting: INTERNAL MEDICINE
Payer: MEDICARE

## 2019-09-30 DIAGNOSIS — K59.00 CONSTIPATION, UNSPECIFIED CONSTIPATION TYPE: Primary | ICD-10-CM

## 2019-09-30 DIAGNOSIS — K56.609 SMALL BOWEL OBSTRUCTION (HCC): ICD-10-CM

## 2019-09-30 LAB
ALBUMIN SERPL-MCNC: 4.3 G/DL (ref 3.5–5.2)
ALBUMIN/GLOBULIN RATIO: 1.3 (ref 1–2.5)
ALP BLD-CCNC: 86 U/L (ref 35–104)
ALT SERPL-CCNC: 15 U/L (ref 5–33)
ANION GAP SERPL CALCULATED.3IONS-SCNC: 13 MMOL/L (ref 9–17)
AST SERPL-CCNC: 20 U/L
BILIRUB SERPL-MCNC: 0.61 MG/DL (ref 0.3–1.2)
BUN BLDV-MCNC: 9 MG/DL (ref 8–23)
BUN/CREAT BLD: 16 (ref 9–20)
CALCIUM SERPL-MCNC: 9.8 MG/DL (ref 8.6–10.4)
CHLORIDE BLD-SCNC: 84 MMOL/L (ref 98–107)
CO2: 34 MMOL/L (ref 20–31)
CREAT SERPL-MCNC: 0.58 MG/DL (ref 0.5–0.9)
GFR AFRICAN AMERICAN: >60 ML/MIN
GFR NON-AFRICAN AMERICAN: >60 ML/MIN
GFR SERPL CREATININE-BSD FRML MDRD: ABNORMAL ML/MIN/{1.73_M2}
GFR SERPL CREATININE-BSD FRML MDRD: ABNORMAL ML/MIN/{1.73_M2}
GLUCOSE BLD-MCNC: 167 MG/DL (ref 70–99)
INR BLD: 2.4
LACTIC ACID: 1.7 MMOL/L (ref 0.5–2.2)
POTASSIUM SERPL-SCNC: 3.5 MMOL/L (ref 3.7–5.3)
PROTHROMBIN TIME: 24.4 SEC (ref 9.4–11.3)
SODIUM BLD-SCNC: 131 MMOL/L (ref 135–144)
TOTAL PROTEIN: 7.7 G/DL (ref 6.4–8.3)

## 2019-09-30 PROCEDURE — 2060000000 HC ICU INTERMEDIATE R&B

## 2019-09-30 PROCEDURE — 2580000003 HC RX 258: Performed by: EMERGENCY MEDICINE

## 2019-09-30 PROCEDURE — 85610 PROTHROMBIN TIME: CPT

## 2019-09-30 PROCEDURE — 83605 ASSAY OF LACTIC ACID: CPT

## 2019-09-30 PROCEDURE — 6360000002 HC RX W HCPCS: Performed by: EMERGENCY MEDICINE

## 2019-09-30 PROCEDURE — 85025 COMPLETE CBC W/AUTO DIFF WBC: CPT

## 2019-09-30 PROCEDURE — 99284 EMERGENCY DEPT VISIT MOD MDM: CPT

## 2019-09-30 PROCEDURE — 80053 COMPREHEN METABOLIC PANEL: CPT

## 2019-09-30 PROCEDURE — 74022 RADEX COMPL AQT ABD SERIES: CPT

## 2019-09-30 PROCEDURE — 36415 COLL VENOUS BLD VENIPUNCTURE: CPT

## 2019-09-30 PROCEDURE — 6370000000 HC RX 637 (ALT 250 FOR IP): Performed by: EMERGENCY MEDICINE

## 2019-09-30 RX ORDER — TRAMADOL HYDROCHLORIDE 50 MG/1
50 TABLET ORAL EVERY 6 HOURS PRN
COMMUNITY
End: 2019-12-13 | Stop reason: ALTCHOICE

## 2019-09-30 RX ORDER — POTASSIUM CHLORIDE 7.45 MG/ML
10 INJECTION INTRAVENOUS PRN
Status: DISCONTINUED | OUTPATIENT
Start: 2019-09-30 | End: 2019-10-01 | Stop reason: HOSPADM

## 2019-09-30 RX ORDER — SODIUM CHLORIDE 0.9 % (FLUSH) 0.9 %
10 SYRINGE (ML) INJECTION EVERY 12 HOURS SCHEDULED
Status: DISCONTINUED | OUTPATIENT
Start: 2019-10-01 | End: 2019-10-01 | Stop reason: HOSPADM

## 2019-09-30 RX ORDER — SODIUM CHLORIDE 9 MG/ML
INJECTION, SOLUTION INTRAVENOUS CONTINUOUS
Status: DISCONTINUED | OUTPATIENT
Start: 2019-09-30 | End: 2019-10-01

## 2019-09-30 RX ORDER — MAGNESIUM SULFATE 1 G/100ML
1 INJECTION INTRAVENOUS PRN
Status: DISCONTINUED | OUTPATIENT
Start: 2019-09-30 | End: 2019-10-01 | Stop reason: HOSPADM

## 2019-09-30 RX ORDER — ACETAMINOPHEN 325 MG/1
650 TABLET ORAL EVERY 4 HOURS PRN
Status: DISCONTINUED | OUTPATIENT
Start: 2019-09-30 | End: 2019-10-01 | Stop reason: HOSPADM

## 2019-09-30 RX ORDER — ONDANSETRON 2 MG/ML
4 INJECTION INTRAMUSCULAR; INTRAVENOUS EVERY 6 HOURS PRN
Status: DISCONTINUED | OUTPATIENT
Start: 2019-09-30 | End: 2019-10-01 | Stop reason: HOSPADM

## 2019-09-30 RX ORDER — POTASSIUM CHLORIDE 20 MEQ/1
40 TABLET, EXTENDED RELEASE ORAL PRN
Status: DISCONTINUED | OUTPATIENT
Start: 2019-09-30 | End: 2019-10-01 | Stop reason: HOSPADM

## 2019-09-30 RX ORDER — FENTANYL CITRATE 50 UG/ML
25 INJECTION, SOLUTION INTRAMUSCULAR; INTRAVENOUS ONCE
Status: COMPLETED | OUTPATIENT
Start: 2019-09-30 | End: 2019-09-30

## 2019-09-30 RX ORDER — POTASSIUM CHLORIDE 750 MG/1
10 CAPSULE, EXTENDED RELEASE ORAL 2 TIMES DAILY
COMMUNITY
End: 2020-04-13 | Stop reason: SDUPTHER

## 2019-09-30 RX ORDER — AMLODIPINE BESYLATE 5 MG/1
5 TABLET ORAL DAILY
COMMUNITY
End: 2019-12-13 | Stop reason: ALTCHOICE

## 2019-09-30 RX ORDER — SODIUM CHLORIDE 9 MG/ML
INJECTION, SOLUTION INTRAVENOUS CONTINUOUS
Status: DISCONTINUED | OUTPATIENT
Start: 2019-10-01 | End: 2019-10-01 | Stop reason: HOSPADM

## 2019-09-30 RX ORDER — ONDANSETRON 4 MG/1
4 TABLET, ORALLY DISINTEGRATING ORAL ONCE
Status: COMPLETED | OUTPATIENT
Start: 2019-09-30 | End: 2019-09-30

## 2019-09-30 RX ORDER — SODIUM CHLORIDE 0.9 % (FLUSH) 0.9 %
10 SYRINGE (ML) INJECTION PRN
Status: DISCONTINUED | OUTPATIENT
Start: 2019-09-30 | End: 2019-10-01 | Stop reason: HOSPADM

## 2019-09-30 RX ORDER — HYDROCHLOROTHIAZIDE 25 MG/1
25 TABLET ORAL DAILY
COMMUNITY
End: 2019-12-13 | Stop reason: ALTCHOICE

## 2019-09-30 RX ORDER — NICOTINE 21 MG/24HR
1 PATCH, TRANSDERMAL 24 HOURS TRANSDERMAL DAILY PRN
Status: DISCONTINUED | OUTPATIENT
Start: 2019-09-30 | End: 2019-10-01 | Stop reason: HOSPADM

## 2019-09-30 RX ADMIN — SODIUM CHLORIDE: 9 INJECTION, SOLUTION INTRAVENOUS at 22:27

## 2019-09-30 RX ADMIN — ONDANSETRON 4 MG: 4 TABLET, ORALLY DISINTEGRATING ORAL at 21:16

## 2019-09-30 RX ADMIN — FENTANYL CITRATE 25 MCG: 50 INJECTION INTRAMUSCULAR; INTRAVENOUS at 22:45

## 2019-09-30 ASSESSMENT — PAIN DESCRIPTION - PAIN TYPE: TYPE: ACUTE PAIN

## 2019-09-30 ASSESSMENT — PAIN SCALES - GENERAL
PAINLEVEL_OUTOF10: 7
PAINLEVEL_OUTOF10: 7
PAINLEVEL_OUTOF10: 4

## 2019-09-30 ASSESSMENT — PAIN DESCRIPTION - PROGRESSION: CLINICAL_PROGRESSION: GRADUALLY WORSENING

## 2019-09-30 ASSESSMENT — PAIN - FUNCTIONAL ASSESSMENT
PAIN_FUNCTIONAL_ASSESSMENT: 0-10
PAIN_FUNCTIONAL_ASSESSMENT: ACTIVITIES ARE NOT PREVENTED

## 2019-09-30 ASSESSMENT — PAIN DESCRIPTION - FREQUENCY: FREQUENCY: CONTINUOUS

## 2019-09-30 ASSESSMENT — PAIN DESCRIPTION - DESCRIPTORS: DESCRIPTORS: ACHING

## 2019-09-30 ASSESSMENT — PAIN DESCRIPTION - ORIENTATION: ORIENTATION: RIGHT;LEFT;MID

## 2019-09-30 ASSESSMENT — PAIN DESCRIPTION - ONSET: ONSET: ON-GOING

## 2019-09-30 ASSESSMENT — PAIN DESCRIPTION - LOCATION: LOCATION: ABDOMEN

## 2019-10-01 ENCOUNTER — APPOINTMENT (OUTPATIENT)
Dept: GENERAL RADIOLOGY | Age: 69
DRG: 389 | End: 2019-10-01
Payer: MEDICARE

## 2019-10-01 ENCOUNTER — TELEPHONE (OUTPATIENT)
Dept: FAMILY MEDICINE CLINIC | Age: 69
End: 2019-10-01

## 2019-10-01 VITALS
DIASTOLIC BLOOD PRESSURE: 68 MMHG | OXYGEN SATURATION: 93 % | TEMPERATURE: 99.7 F | BODY MASS INDEX: 23 KG/M2 | RESPIRATION RATE: 16 BRPM | HEIGHT: 62 IN | HEART RATE: 90 BPM | WEIGHT: 125 LBS | SYSTOLIC BLOOD PRESSURE: 141 MMHG

## 2019-10-01 LAB
-: ABNORMAL
ABSOLUTE EOS #: 0.24 K/UL (ref 0–0.4)
ABSOLUTE IMMATURE GRANULOCYTE: ABNORMAL K/UL (ref 0–0.3)
ABSOLUTE LYMPH #: 0.24 K/UL (ref 1–4.8)
ABSOLUTE MONO #: 0.24 K/UL (ref 0.1–1.2)
AMORPHOUS: ABNORMAL
ANION GAP SERPL CALCULATED.3IONS-SCNC: 12 MMOL/L (ref 9–17)
BACTERIA: ABNORMAL
BASOPHILS # BLD: 1 % (ref 0–1)
BASOPHILS ABSOLUTE: 0.24 K/UL (ref 0–0.2)
BILIRUBIN URINE: NEGATIVE
BUN BLDV-MCNC: 9 MG/DL (ref 8–23)
BUN/CREAT BLD: 17 (ref 9–20)
CALCIUM SERPL-MCNC: 8.4 MG/DL (ref 8.6–10.4)
CASTS UA: ABNORMAL /LPF (ref 0–2)
CHLORIDE BLD-SCNC: 87 MMOL/L (ref 98–107)
CO2: 32 MMOL/L (ref 20–31)
COLOR: ABNORMAL
COMMENT UA: ABNORMAL
CREAT SERPL-MCNC: 0.54 MG/DL (ref 0.5–0.9)
CRYSTALS, UA: ABNORMAL /HPF
DIFFERENTIAL TYPE: ABNORMAL
EOSINOPHILS RELATIVE PERCENT: 1 % (ref 1–7)
EPITHELIAL CELLS UA: ABNORMAL /HPF (ref 0–5)
GFR AFRICAN AMERICAN: >60 ML/MIN
GFR NON-AFRICAN AMERICAN: >60 ML/MIN
GFR SERPL CREATININE-BSD FRML MDRD: ABNORMAL ML/MIN/{1.73_M2}
GFR SERPL CREATININE-BSD FRML MDRD: ABNORMAL ML/MIN/{1.73_M2}
GLUCOSE BLD-MCNC: 117 MG/DL (ref 70–99)
GLUCOSE URINE: NEGATIVE
HCT VFR BLD CALC: 30.5 % (ref 36–46)
HCT VFR BLD CALC: 34.1 % (ref 36–46)
HEMOGLOBIN: 10 G/DL (ref 12–16)
HEMOGLOBIN: 11.3 G/DL (ref 12–16)
IMMATURE GRANULOCYTES: ABNORMAL %
INR BLD: 2.6
KETONES, URINE: NEGATIVE
LEUKOCYTE ESTERASE, URINE: NEGATIVE
LYMPHOCYTES # BLD: 1 % (ref 16–46)
MCH RBC QN AUTO: 30.7 PG (ref 26–34)
MCH RBC QN AUTO: 31 PG (ref 26–34)
MCHC RBC AUTO-ENTMCNC: 32.9 G/DL (ref 31–37)
MCHC RBC AUTO-ENTMCNC: 33.1 G/DL (ref 31–37)
MCV RBC AUTO: 93.4 FL (ref 80–100)
MCV RBC AUTO: 93.6 FL (ref 80–100)
MONOCYTES # BLD: 1 % (ref 4–11)
MORPHOLOGY: ABNORMAL
MUCUS: ABNORMAL
NITRITE, URINE: NEGATIVE
NRBC AUTOMATED: ABNORMAL PER 100 WBC
NRBC AUTOMATED: ABNORMAL PER 100 WBC
OTHER OBSERVATIONS UA: ABNORMAL
PDW BLD-RTO: 13.4 % (ref 11–14.5)
PDW BLD-RTO: 13.6 % (ref 11–14.5)
PH UA: 8.5 (ref 5–6)
PLATELET # BLD: 599 K/UL (ref 140–450)
PLATELET # BLD: 671 K/UL (ref 140–450)
PLATELET ESTIMATE: ABNORMAL
PMV BLD AUTO: 6.3 FL (ref 6–12)
PMV BLD AUTO: 6.5 FL (ref 6–12)
POTASSIUM SERPL-SCNC: 3.8 MMOL/L (ref 3.7–5.3)
PROTEIN UA: NEGATIVE
PROTHROMBIN TIME: 25.8 SEC (ref 9.4–11.3)
RBC # BLD: 3.27 M/UL (ref 4–5.2)
RBC # BLD: 3.65 M/UL (ref 4–5.2)
RBC # BLD: ABNORMAL 10*6/UL
RBC UA: ABNORMAL /HPF (ref 0–4)
RENAL EPITHELIAL, UA: ABNORMAL /HPF
SEG NEUTROPHILS: 96 % (ref 43–77)
SEGMENTED NEUTROPHILS ABSOLUTE COUNT: 22.74 K/UL (ref 1.8–7.7)
SODIUM BLD-SCNC: 131 MMOL/L (ref 135–144)
SPECIFIC GRAVITY UA: 1.02 (ref 1.01–1.02)
TRICHOMONAS: ABNORMAL
TURBIDITY: ABNORMAL
URINE HGB: NEGATIVE
UROBILINOGEN, URINE: NORMAL
WBC # BLD: 20.8 K/UL (ref 3.5–11)
WBC # BLD: 23.7 K/UL (ref 3.5–11)
WBC # BLD: ABNORMAL 10*3/UL
WBC UA: ABNORMAL /HPF (ref 0–4)
YEAST: ABNORMAL

## 2019-10-01 PROCEDURE — 6370000000 HC RX 637 (ALT 250 FOR IP): Performed by: INTERNAL MEDICINE

## 2019-10-01 PROCEDURE — 97161 PT EVAL LOW COMPLEX 20 MIN: CPT

## 2019-10-01 PROCEDURE — 36415 COLL VENOUS BLD VENIPUNCTURE: CPT

## 2019-10-01 PROCEDURE — 6360000002 HC RX W HCPCS: Performed by: NURSE PRACTITIONER

## 2019-10-01 PROCEDURE — 2580000003 HC RX 258: Performed by: NURSE PRACTITIONER

## 2019-10-01 PROCEDURE — 80048 BASIC METABOLIC PNL TOTAL CA: CPT

## 2019-10-01 PROCEDURE — 99238 HOSP IP/OBS DSCHRG MGMT 30/<: CPT | Performed by: INTERNAL MEDICINE

## 2019-10-01 PROCEDURE — 85610 PROTHROMBIN TIME: CPT

## 2019-10-01 PROCEDURE — 74022 RADEX COMPL AQT ABD SERIES: CPT

## 2019-10-01 PROCEDURE — 94760 N-INVAS EAR/PLS OXIMETRY 1: CPT

## 2019-10-01 PROCEDURE — 93005 ELECTROCARDIOGRAM TRACING: CPT | Performed by: NURSE PRACTITIONER

## 2019-10-01 PROCEDURE — 81001 URINALYSIS AUTO W/SCOPE: CPT

## 2019-10-01 PROCEDURE — 85027 COMPLETE CBC AUTOMATED: CPT

## 2019-10-01 PROCEDURE — 2500000003 HC RX 250 WO HCPCS: Performed by: NURSE PRACTITIONER

## 2019-10-01 RX ORDER — ASPIRIN 81 MG/1
81 TABLET ORAL DAILY
Status: DISCONTINUED | OUTPATIENT
Start: 2019-10-01 | End: 2019-10-01 | Stop reason: HOSPADM

## 2019-10-01 RX ORDER — WARFARIN SODIUM 2.5 MG/1
2.5 TABLET ORAL
Status: DISCONTINUED | OUTPATIENT
Start: 2019-10-01 | End: 2019-10-01 | Stop reason: HOSPADM

## 2019-10-01 RX ORDER — DOCUSATE SODIUM 100 MG/1
100 CAPSULE, LIQUID FILLED ORAL 2 TIMES DAILY
Status: DISCONTINUED | OUTPATIENT
Start: 2019-10-01 | End: 2019-10-01 | Stop reason: HOSPADM

## 2019-10-01 RX ORDER — POLYETHYLENE GLYCOL 3350 17 G/17G
17 POWDER, FOR SOLUTION ORAL DAILY
Qty: 527 G | Refills: 0 | COMMUNITY
Start: 2019-10-02 | End: 2019-11-01

## 2019-10-01 RX ORDER — KETOROLAC TROMETHAMINE 30 MG/ML
15 INJECTION, SOLUTION INTRAMUSCULAR; INTRAVENOUS EVERY 6 HOURS PRN
Status: DISCONTINUED | OUTPATIENT
Start: 2019-10-01 | End: 2019-10-01 | Stop reason: HOSPADM

## 2019-10-01 RX ORDER — POLYETHYLENE GLYCOL 3350 17 G/17G
17 POWDER, FOR SOLUTION ORAL DAILY
Status: DISCONTINUED | OUTPATIENT
Start: 2019-10-01 | End: 2019-10-01 | Stop reason: HOSPADM

## 2019-10-01 RX ORDER — LANOLIN ALCOHOL/MO/W.PET/CERES
400 CREAM (GRAM) TOPICAL 3 TIMES DAILY
Status: DISCONTINUED | OUTPATIENT
Start: 2019-10-01 | End: 2019-10-01 | Stop reason: HOSPADM

## 2019-10-01 RX ORDER — PSEUDOEPHEDRINE HCL 30 MG
100 TABLET ORAL 2 TIMES DAILY
Qty: 60 CAPSULE | Refills: 0 | COMMUNITY
Start: 2019-10-01 | End: 2020-06-15

## 2019-10-01 RX ORDER — KETOROLAC TROMETHAMINE 30 MG/ML
30 INJECTION, SOLUTION INTRAMUSCULAR; INTRAVENOUS EVERY 6 HOURS PRN
Status: DISCONTINUED | OUTPATIENT
Start: 2019-10-01 | End: 2019-10-01

## 2019-10-01 RX ORDER — TRAMADOL HYDROCHLORIDE 50 MG/1
50 TABLET ORAL EVERY 6 HOURS PRN
Status: DISCONTINUED | OUTPATIENT
Start: 2019-10-01 | End: 2019-10-01 | Stop reason: HOSPADM

## 2019-10-01 RX ORDER — HYDROCHLOROTHIAZIDE 25 MG/1
25 TABLET ORAL DAILY
Status: DISCONTINUED | OUTPATIENT
Start: 2019-10-01 | End: 2019-10-01 | Stop reason: HOSPADM

## 2019-10-01 RX ORDER — AMLODIPINE BESYLATE 5 MG/1
5 TABLET ORAL DAILY
Status: DISCONTINUED | OUTPATIENT
Start: 2019-10-01 | End: 2019-10-01 | Stop reason: HOSPADM

## 2019-10-01 RX ORDER — KETOROLAC TROMETHAMINE 30 MG/ML
INJECTION, SOLUTION INTRAMUSCULAR; INTRAVENOUS
Status: DISCONTINUED
Start: 2019-10-01 | End: 2019-10-01 | Stop reason: HOSPADM

## 2019-10-01 RX ORDER — POTASSIUM CHLORIDE 7.45 MG/ML
10 INJECTION INTRAVENOUS PRN
Status: DISCONTINUED | OUTPATIENT
Start: 2019-10-01 | End: 2019-10-01 | Stop reason: HOSPADM

## 2019-10-01 RX ORDER — POTASSIUM CHLORIDE 750 MG/1
10 CAPSULE, EXTENDED RELEASE ORAL 2 TIMES DAILY
Status: DISCONTINUED | OUTPATIENT
Start: 2019-10-01 | End: 2019-10-01 | Stop reason: HOSPADM

## 2019-10-01 RX ADMIN — HYDROCHLOROTHIAZIDE 25 MG: 25 TABLET ORAL at 10:34

## 2019-10-01 RX ADMIN — POTASSIUM CHLORIDE 10 MEQ: 10 INJECTION, SOLUTION INTRAVENOUS at 06:20

## 2019-10-01 RX ADMIN — SODIUM CHLORIDE: 9 INJECTION, SOLUTION INTRAVENOUS at 00:00

## 2019-10-01 RX ADMIN — POTASSIUM CHLORIDE 10 MEQ: 10 INJECTION, SOLUTION INTRAVENOUS at 04:02

## 2019-10-01 RX ADMIN — POLYETHYLENE GLYCOL 3350 17 G: 17 POWDER, FOR SOLUTION ORAL at 10:34

## 2019-10-01 RX ADMIN — POTASSIUM CHLORIDE 10 MEQ: 10 INJECTION, SOLUTION INTRAVENOUS at 05:10

## 2019-10-01 RX ADMIN — ASPIRIN 81 MG: 81 TABLET ORAL at 10:34

## 2019-10-01 RX ADMIN — FAMOTIDINE 20 MG: 10 INJECTION INTRAVENOUS at 08:28

## 2019-10-01 RX ADMIN — AMLODIPINE BESYLATE 5 MG: 5 TABLET ORAL at 10:34

## 2019-10-01 RX ADMIN — FAMOTIDINE 20 MG: 10 INJECTION INTRAVENOUS at 01:02

## 2019-10-01 RX ADMIN — POTASSIUM CHLORIDE 10 MEQ: 10 INJECTION, SOLUTION INTRAVENOUS at 07:27

## 2019-10-01 RX ADMIN — POTASSIUM CHLORIDE 10 MEQ: 750 CAPSULE, EXTENDED RELEASE ORAL at 10:34

## 2019-10-01 RX ADMIN — DOCUSATE SODIUM 100 MG: 100 CAPSULE, LIQUID FILLED ORAL at 10:34

## 2019-10-01 RX ADMIN — KETOROLAC TROMETHAMINE 15 MG: 30 INJECTION, SOLUTION INTRAMUSCULAR at 03:23

## 2019-10-01 RX ADMIN — MAGNESIUM OXIDE TAB 400 MG (241.3 MG ELEMENTAL MG) 400 MG: 400 (241.3 MG) TAB at 10:34

## 2019-10-01 ASSESSMENT — PAIN SCALES - GENERAL
PAINLEVEL_OUTOF10: 9
PAINLEVEL_OUTOF10: 0
PAINLEVEL_OUTOF10: 0
PAINLEVEL_OUTOF10: 10

## 2019-10-01 ASSESSMENT — PAIN DESCRIPTION - LOCATION
LOCATION: ABDOMEN
LOCATION: ABDOMEN

## 2019-10-01 ASSESSMENT — PAIN DESCRIPTION - PAIN TYPE
TYPE: ACUTE PAIN
TYPE: ACUTE PAIN

## 2019-10-01 ASSESSMENT — PAIN DESCRIPTION - ORIENTATION: ORIENTATION: RIGHT;LEFT;MID

## 2019-10-01 ASSESSMENT — PAIN - FUNCTIONAL ASSESSMENT: PAIN_FUNCTIONAL_ASSESSMENT: PREVENTS OR INTERFERES SOME ACTIVE ACTIVITIES AND ADLS

## 2019-10-01 ASSESSMENT — PAIN DESCRIPTION - ONSET: ONSET: ON-GOING

## 2019-10-01 ASSESSMENT — PAIN DESCRIPTION - DESCRIPTORS: DESCRIPTORS: SHOOTING;STABBING

## 2019-10-01 ASSESSMENT — PAIN DESCRIPTION - FREQUENCY: FREQUENCY: INTERMITTENT

## 2019-10-02 ENCOUNTER — ANTI-COAG VISIT (OUTPATIENT)
Dept: FAMILY MEDICINE CLINIC | Age: 69
End: 2019-10-02
Payer: MEDICARE

## 2019-10-02 DIAGNOSIS — F41.9 ANXIETY: ICD-10-CM

## 2019-10-02 DIAGNOSIS — I48.0 PAROXYSMAL ATRIAL FIBRILLATION (HCC): ICD-10-CM

## 2019-10-02 DIAGNOSIS — I48.0 PAROXYSMAL ATRIAL FIBRILLATION (HCC): Primary | ICD-10-CM

## 2019-10-02 LAB
EKG ATRIAL RATE: 104 BPM
EKG Q-T INTERVAL: 374 MS
EKG QRS DURATION: 100 MS
EKG QTC CALCULATION (BAZETT): 474 MS
EKG R AXIS: -30 DEGREES
EKG T AXIS: -155 DEGREES
EKG VENTRICULAR RATE: 97 BPM
INR BLD: 2.6

## 2019-10-02 PROCEDURE — 93793 ANTICOAG MGMT PT WARFARIN: CPT | Performed by: FAMILY MEDICINE

## 2019-10-02 RX ORDER — ALPRAZOLAM 0.25 MG/1
0.25 TABLET ORAL EVERY 8 HOURS PRN
Qty: 90 TABLET | Refills: 2 | Status: SHIPPED | OUTPATIENT
Start: 2019-10-02 | End: 2019-12-13 | Stop reason: SDUPTHER

## 2019-10-09 ENCOUNTER — ANTI-COAG VISIT (OUTPATIENT)
Dept: FAMILY MEDICINE CLINIC | Age: 69
End: 2019-10-09
Payer: MEDICARE

## 2019-10-09 DIAGNOSIS — I48.0 PAROXYSMAL ATRIAL FIBRILLATION (HCC): ICD-10-CM

## 2019-10-09 LAB — INR BLD: 3

## 2019-10-09 PROCEDURE — 93793 ANTICOAG MGMT PT WARFARIN: CPT | Performed by: FAMILY MEDICINE

## 2019-10-16 ENCOUNTER — ANTI-COAG VISIT (OUTPATIENT)
Dept: FAMILY MEDICINE CLINIC | Age: 69
End: 2019-10-16
Payer: MEDICARE

## 2019-10-16 DIAGNOSIS — I48.0 PAROXYSMAL ATRIAL FIBRILLATION (HCC): ICD-10-CM

## 2019-10-16 LAB — INR BLD: 3.5

## 2019-10-16 PROCEDURE — 93793 ANTICOAG MGMT PT WARFARIN: CPT | Performed by: FAMILY MEDICINE

## 2019-10-23 ENCOUNTER — ANTI-COAG VISIT (OUTPATIENT)
Dept: FAMILY MEDICINE CLINIC | Age: 69
End: 2019-10-23
Payer: MEDICARE

## 2019-10-23 DIAGNOSIS — I48.0 PAROXYSMAL ATRIAL FIBRILLATION (HCC): ICD-10-CM

## 2019-10-23 LAB — INR BLD: 3.7

## 2019-10-23 PROCEDURE — 93793 ANTICOAG MGMT PT WARFARIN: CPT | Performed by: FAMILY MEDICINE

## 2019-10-30 ENCOUNTER — TELEPHONE (OUTPATIENT)
Dept: FAMILY MEDICINE CLINIC | Age: 69
End: 2019-10-30

## 2019-10-30 ENCOUNTER — ANTI-COAG VISIT (OUTPATIENT)
Dept: FAMILY MEDICINE CLINIC | Age: 69
End: 2019-10-30
Payer: MEDICARE

## 2019-10-30 DIAGNOSIS — E87.6 LOW BLOOD POTASSIUM: ICD-10-CM

## 2019-10-30 DIAGNOSIS — D72.829 LEUKOCYTOSIS, UNSPECIFIED TYPE: Primary | ICD-10-CM

## 2019-10-30 DIAGNOSIS — I48.0 PAROXYSMAL ATRIAL FIBRILLATION (HCC): ICD-10-CM

## 2019-10-30 DIAGNOSIS — R79.0 LOW MAGNESIUM LEVEL: ICD-10-CM

## 2019-10-30 LAB — INR BLD: 2.1

## 2019-10-30 PROCEDURE — 93793 ANTICOAG MGMT PT WARFARIN: CPT | Performed by: FAMILY MEDICINE

## 2019-10-31 ENCOUNTER — HOSPITAL ENCOUNTER (OUTPATIENT)
Dept: LAB | Age: 69
Discharge: HOME | End: 2019-10-31
Payer: MEDICARE

## 2019-10-31 DIAGNOSIS — E87.6 LOW BLOOD POTASSIUM: ICD-10-CM

## 2019-10-31 DIAGNOSIS — R79.0 LOW MAGNESIUM LEVEL: ICD-10-CM

## 2019-10-31 DIAGNOSIS — D72.829 LEUKOCYTOSIS, UNSPECIFIED TYPE: ICD-10-CM

## 2019-10-31 LAB
ABSOLUTE EOS #: 0.3 K/UL (ref 0–0.4)
ABSOLUTE IMMATURE GRANULOCYTE: ABNORMAL K/UL (ref 0–0.3)
ABSOLUTE LYMPH #: 1.5 K/UL (ref 1–4.8)
ABSOLUTE MONO #: 0.8 K/UL (ref 0.1–1.2)
ANION GAP SERPL CALCULATED.3IONS-SCNC: 13 MMOL/L (ref 9–17)
BASOPHILS # BLD: 1 % (ref 0–1)
BASOPHILS ABSOLUTE: 0.1 K/UL (ref 0–0.2)
BUN BLDV-MCNC: 10 MG/DL (ref 8–23)
BUN/CREAT BLD: 17 (ref 9–20)
CALCIUM SERPL-MCNC: 10 MG/DL (ref 8.6–10.4)
CHLORIDE BLD-SCNC: 96 MMOL/L (ref 98–107)
CO2: 27 MMOL/L (ref 20–31)
CREAT SERPL-MCNC: 0.6 MG/DL (ref 0.5–0.9)
DIFFERENTIAL TYPE: ABNORMAL
EOSINOPHILS RELATIVE PERCENT: 4 % (ref 1–7)
GFR AFRICAN AMERICAN: >60 ML/MIN
GFR NON-AFRICAN AMERICAN: >60 ML/MIN
GFR SERPL CREATININE-BSD FRML MDRD: ABNORMAL ML/MIN/{1.73_M2}
GFR SERPL CREATININE-BSD FRML MDRD: ABNORMAL ML/MIN/{1.73_M2}
GLUCOSE BLD-MCNC: 110 MG/DL (ref 70–99)
HCT VFR BLD CALC: 36.8 % (ref 36–46)
HEMOGLOBIN: 12.1 G/DL (ref 12–16)
IMMATURE GRANULOCYTES: ABNORMAL %
LYMPHOCYTES # BLD: 21 % (ref 16–46)
MAGNESIUM: 2.1 MG/DL (ref 1.6–2.6)
MCH RBC QN AUTO: 30.6 PG (ref 26–34)
MCHC RBC AUTO-ENTMCNC: 32.8 G/DL (ref 31–37)
MCV RBC AUTO: 93.2 FL (ref 80–100)
MONOCYTES # BLD: 11 % (ref 4–11)
NRBC AUTOMATED: ABNORMAL PER 100 WBC
PDW BLD-RTO: 14.7 % (ref 11–14.5)
PLATELET # BLD: 465 K/UL (ref 140–450)
PLATELET ESTIMATE: ABNORMAL
PMV BLD AUTO: 6.7 FL (ref 6–12)
POTASSIUM SERPL-SCNC: 4.2 MMOL/L (ref 3.7–5.3)
RBC # BLD: 3.95 M/UL (ref 4–5.2)
RBC # BLD: ABNORMAL 10*6/UL
SEG NEUTROPHILS: 63 % (ref 43–77)
SEGMENTED NEUTROPHILS ABSOLUTE COUNT: 4.6 K/UL (ref 1.8–7.7)
SODIUM BLD-SCNC: 136 MMOL/L (ref 135–144)
WBC # BLD: 7.3 K/UL (ref 3.5–11)
WBC # BLD: ABNORMAL 10*3/UL

## 2019-10-31 PROCEDURE — 83735 ASSAY OF MAGNESIUM: CPT

## 2019-10-31 PROCEDURE — 80048 BASIC METABOLIC PNL TOTAL CA: CPT

## 2019-10-31 PROCEDURE — 85025 COMPLETE CBC W/AUTO DIFF WBC: CPT

## 2019-10-31 PROCEDURE — 36415 COLL VENOUS BLD VENIPUNCTURE: CPT

## 2019-11-06 ENCOUNTER — ANTI-COAG VISIT (OUTPATIENT)
Dept: FAMILY MEDICINE CLINIC | Age: 69
End: 2019-11-06
Payer: MEDICARE

## 2019-11-06 DIAGNOSIS — I48.0 PAROXYSMAL ATRIAL FIBRILLATION (HCC): ICD-10-CM

## 2019-11-06 LAB — INR BLD: 2.5

## 2019-11-06 PROCEDURE — 93793 ANTICOAG MGMT PT WARFARIN: CPT | Performed by: FAMILY MEDICINE

## 2019-11-13 ENCOUNTER — ANTI-COAG VISIT (OUTPATIENT)
Dept: FAMILY MEDICINE CLINIC | Age: 69
End: 2019-11-13
Payer: MEDICARE

## 2019-11-13 DIAGNOSIS — I48.0 PAROXYSMAL ATRIAL FIBRILLATION (HCC): ICD-10-CM

## 2019-11-13 LAB — INR BLD: 2.1

## 2019-11-13 PROCEDURE — 93793 ANTICOAG MGMT PT WARFARIN: CPT | Performed by: FAMILY MEDICINE

## 2019-11-20 ENCOUNTER — ANTI-COAG VISIT (OUTPATIENT)
Dept: FAMILY MEDICINE CLINIC | Age: 69
End: 2019-11-20
Payer: MEDICARE

## 2019-11-20 DIAGNOSIS — I48.0 PAROXYSMAL ATRIAL FIBRILLATION (HCC): ICD-10-CM

## 2019-11-20 LAB — INR BLD: 1.9

## 2019-11-20 PROCEDURE — 93793 ANTICOAG MGMT PT WARFARIN: CPT | Performed by: FAMILY MEDICINE

## 2019-11-27 ENCOUNTER — ANTI-COAG VISIT (OUTPATIENT)
Dept: FAMILY MEDICINE CLINIC | Age: 69
End: 2019-11-27
Payer: MEDICARE

## 2019-11-27 DIAGNOSIS — I48.0 PAROXYSMAL ATRIAL FIBRILLATION (HCC): ICD-10-CM

## 2019-11-27 LAB — INR BLD: 2.6

## 2019-11-27 PROCEDURE — 93793 ANTICOAG MGMT PT WARFARIN: CPT | Performed by: PHYSICIAN ASSISTANT

## 2019-12-04 ENCOUNTER — ANTI-COAG VISIT (OUTPATIENT)
Dept: FAMILY MEDICINE CLINIC | Age: 69
End: 2019-12-04
Payer: MEDICARE

## 2019-12-04 DIAGNOSIS — I48.0 PAROXYSMAL ATRIAL FIBRILLATION (HCC): ICD-10-CM

## 2019-12-04 LAB — INR BLD: 2.3

## 2019-12-04 PROCEDURE — 93793 ANTICOAG MGMT PT WARFARIN: CPT | Performed by: FAMILY MEDICINE

## 2019-12-11 ENCOUNTER — ANTI-COAG VISIT (OUTPATIENT)
Dept: FAMILY MEDICINE CLINIC | Age: 69
End: 2019-12-11
Payer: MEDICARE

## 2019-12-11 ENCOUNTER — ANTI-COAG VISIT (OUTPATIENT)
Dept: FAMILY MEDICINE CLINIC | Age: 69
End: 2019-12-11

## 2019-12-11 ENCOUNTER — HOSPITAL ENCOUNTER (OUTPATIENT)
Dept: LAB | Age: 69
Discharge: HOME | End: 2019-12-11
Payer: MEDICARE

## 2019-12-11 DIAGNOSIS — I48.0 PAROXYSMAL ATRIAL FIBRILLATION (HCC): ICD-10-CM

## 2019-12-11 DIAGNOSIS — I10 ESSENTIAL HYPERTENSION: ICD-10-CM

## 2019-12-11 DIAGNOSIS — E78.2 MIXED HYPERLIPIDEMIA: ICD-10-CM

## 2019-12-11 DIAGNOSIS — R73.01 IMPAIRED FASTING GLUCOSE: ICD-10-CM

## 2019-12-11 DIAGNOSIS — E83.42 HYPOMAGNESEMIA: ICD-10-CM

## 2019-12-11 LAB
ABSOLUTE EOS #: 0.2 K/UL (ref 0–0.4)
ABSOLUTE IMMATURE GRANULOCYTE: ABNORMAL K/UL (ref 0–0.3)
ABSOLUTE LYMPH #: 1.8 K/UL (ref 1–4.8)
ABSOLUTE MONO #: 0.7 K/UL (ref 0.1–1.2)
ALBUMIN SERPL-MCNC: 4.6 G/DL (ref 3.5–5.2)
ALBUMIN/GLOBULIN RATIO: 1.4 (ref 1–2.5)
ALP BLD-CCNC: 86 U/L (ref 35–104)
ALT SERPL-CCNC: 11 U/L (ref 5–33)
ANION GAP SERPL CALCULATED.3IONS-SCNC: 14 MMOL/L (ref 9–17)
AST SERPL-CCNC: 20 U/L
BASOPHILS # BLD: 1 % (ref 0–1)
BASOPHILS ABSOLUTE: 0 K/UL (ref 0–0.2)
BILIRUB SERPL-MCNC: 0.33 MG/DL (ref 0.3–1.2)
BUN BLDV-MCNC: 11 MG/DL (ref 8–23)
BUN/CREAT BLD: 18 (ref 9–20)
CALCIUM SERPL-MCNC: 9.8 MG/DL (ref 8.6–10.4)
CHLORIDE BLD-SCNC: 95 MMOL/L (ref 98–107)
CHOLESTEROL/HDL RATIO: 3.6
CHOLESTEROL: 187 MG/DL
CO2: 25 MMOL/L (ref 20–31)
CREAT SERPL-MCNC: 0.62 MG/DL (ref 0.5–0.9)
DIFFERENTIAL TYPE: ABNORMAL
EOSINOPHILS RELATIVE PERCENT: 3 % (ref 1–7)
ESTIMATED AVERAGE GLUCOSE: 114 MG/DL
GFR AFRICAN AMERICAN: >60 ML/MIN
GFR NON-AFRICAN AMERICAN: >60 ML/MIN
GFR SERPL CREATININE-BSD FRML MDRD: ABNORMAL ML/MIN/{1.73_M2}
GFR SERPL CREATININE-BSD FRML MDRD: ABNORMAL ML/MIN/{1.73_M2}
GLUCOSE BLD-MCNC: 107 MG/DL (ref 70–99)
HBA1C MFR BLD: 5.6 % (ref 4.8–5.9)
HCT VFR BLD CALC: 40.7 % (ref 36–46)
HDLC SERPL-MCNC: 52 MG/DL
HEMOGLOBIN: 13.1 G/DL (ref 12–16)
IMMATURE GRANULOCYTES: ABNORMAL %
INR BLD: 1.9
INR BLD: 2
LDL CHOLESTEROL: 111 MG/DL (ref 0–130)
LYMPHOCYTES # BLD: 33 % (ref 16–46)
MAGNESIUM: 2.1 MG/DL (ref 1.6–2.6)
MCH RBC QN AUTO: 30 PG (ref 26–34)
MCHC RBC AUTO-ENTMCNC: 32.3 G/DL (ref 31–37)
MCV RBC AUTO: 93 FL (ref 80–100)
MONOCYTES # BLD: 12 % (ref 4–11)
NRBC AUTOMATED: ABNORMAL PER 100 WBC
PDW BLD-RTO: 15 % (ref 11–14.5)
PLATELET # BLD: 358 K/UL (ref 140–450)
PLATELET ESTIMATE: ABNORMAL
PMV BLD AUTO: 6.6 FL (ref 6–12)
POTASSIUM SERPL-SCNC: 5.1 MMOL/L (ref 3.7–5.3)
PROTHROMBIN TIME: 18.8 SEC (ref 9.4–11.3)
RBC # BLD: 4.38 M/UL (ref 4–5.2)
RBC # BLD: ABNORMAL 10*6/UL
SEG NEUTROPHILS: 51 % (ref 43–77)
SEGMENTED NEUTROPHILS ABSOLUTE COUNT: 2.8 K/UL (ref 1.8–7.7)
SODIUM BLD-SCNC: 134 MMOL/L (ref 135–144)
TOTAL PROTEIN: 7.9 G/DL (ref 6.4–8.3)
TRIGL SERPL-MCNC: 119 MG/DL
TSH SERPL DL<=0.05 MIU/L-ACNC: 3.21 MIU/L (ref 0.3–5)
VLDLC SERPL CALC-MCNC: NORMAL MG/DL (ref 1–30)
WBC # BLD: 5.5 K/UL (ref 3.5–11)
WBC # BLD: ABNORMAL 10*3/UL

## 2019-12-11 PROCEDURE — 85610 PROTHROMBIN TIME: CPT

## 2019-12-11 PROCEDURE — 80053 COMPREHEN METABOLIC PANEL: CPT

## 2019-12-11 PROCEDURE — 84443 ASSAY THYROID STIM HORMONE: CPT

## 2019-12-11 PROCEDURE — 80061 LIPID PANEL: CPT

## 2019-12-11 PROCEDURE — 83036 HEMOGLOBIN GLYCOSYLATED A1C: CPT

## 2019-12-11 PROCEDURE — 83735 ASSAY OF MAGNESIUM: CPT

## 2019-12-11 PROCEDURE — 93793 ANTICOAG MGMT PT WARFARIN: CPT | Performed by: FAMILY MEDICINE

## 2019-12-11 PROCEDURE — 85025 COMPLETE CBC W/AUTO DIFF WBC: CPT

## 2019-12-11 PROCEDURE — 36415 COLL VENOUS BLD VENIPUNCTURE: CPT

## 2019-12-13 ENCOUNTER — OFFICE VISIT (OUTPATIENT)
Dept: FAMILY MEDICINE CLINIC | Age: 69
End: 2019-12-13
Payer: MEDICARE

## 2019-12-13 VITALS
WEIGHT: 116 LBS | SYSTOLIC BLOOD PRESSURE: 138 MMHG | DIASTOLIC BLOOD PRESSURE: 80 MMHG | BODY MASS INDEX: 21.35 KG/M2 | RESPIRATION RATE: 16 BRPM | HEIGHT: 62 IN | HEART RATE: 80 BPM

## 2019-12-13 DIAGNOSIS — R73.01 IMPAIRED FASTING GLUCOSE: ICD-10-CM

## 2019-12-13 DIAGNOSIS — L57.0 ACTINIC KERATOSIS: ICD-10-CM

## 2019-12-13 DIAGNOSIS — I48.0 PAROXYSMAL ATRIAL FIBRILLATION (HCC): ICD-10-CM

## 2019-12-13 DIAGNOSIS — E78.2 MIXED HYPERLIPIDEMIA: ICD-10-CM

## 2019-12-13 DIAGNOSIS — I10 ESSENTIAL HYPERTENSION: Primary | ICD-10-CM

## 2019-12-13 DIAGNOSIS — E83.42 HYPOMAGNESEMIA: ICD-10-CM

## 2019-12-13 DIAGNOSIS — F41.9 ANXIETY: ICD-10-CM

## 2019-12-13 PROCEDURE — 4040F PNEUMOC VAC/ADMIN/RCVD: CPT | Performed by: FAMILY MEDICINE

## 2019-12-13 PROCEDURE — 1123F ACP DISCUSS/DSCN MKR DOCD: CPT | Performed by: FAMILY MEDICINE

## 2019-12-13 PROCEDURE — 3017F COLORECTAL CA SCREEN DOC REV: CPT | Performed by: FAMILY MEDICINE

## 2019-12-13 PROCEDURE — G8427 DOCREV CUR MEDS BY ELIG CLIN: HCPCS | Performed by: FAMILY MEDICINE

## 2019-12-13 PROCEDURE — G8420 CALC BMI NORM PARAMETERS: HCPCS | Performed by: FAMILY MEDICINE

## 2019-12-13 PROCEDURE — 17000 DESTRUCT PREMALG LESION: CPT | Performed by: FAMILY MEDICINE

## 2019-12-13 PROCEDURE — 1036F TOBACCO NON-USER: CPT | Performed by: FAMILY MEDICINE

## 2019-12-13 PROCEDURE — 99214 OFFICE O/P EST MOD 30 MIN: CPT | Performed by: FAMILY MEDICINE

## 2019-12-13 PROCEDURE — G8484 FLU IMMUNIZE NO ADMIN: HCPCS | Performed by: FAMILY MEDICINE

## 2019-12-13 PROCEDURE — G8399 PT W/DXA RESULTS DOCUMENT: HCPCS | Performed by: FAMILY MEDICINE

## 2019-12-13 PROCEDURE — 1090F PRES/ABSN URINE INCON ASSESS: CPT | Performed by: FAMILY MEDICINE

## 2019-12-13 RX ORDER — LANOLIN ALCOHOL/MO/W.PET/CERES
3 CREAM (GRAM) TOPICAL
COMMUNITY

## 2019-12-13 RX ORDER — FUROSEMIDE 20 MG/1
20 TABLET ORAL
COMMUNITY
Start: 2019-11-01 | End: 2020-06-15

## 2019-12-13 RX ORDER — ALPRAZOLAM 0.25 MG/1
0.25 TABLET ORAL EVERY 8 HOURS PRN
Qty: 90 TABLET | Refills: 2 | Status: SHIPPED | OUTPATIENT
Start: 2020-01-03 | End: 2020-04-09

## 2019-12-13 RX ORDER — DILTIAZEM HYDROCHLORIDE 120 MG/1
120 CAPSULE, COATED, EXTENDED RELEASE ORAL DAILY
COMMUNITY
Start: 2019-11-13 | End: 2020-09-09

## 2019-12-13 ASSESSMENT — PATIENT HEALTH QUESTIONNAIRE - PHQ9
2. FEELING DOWN, DEPRESSED OR HOPELESS: 1
SUM OF ALL RESPONSES TO PHQ QUESTIONS 1-9: 1
1. LITTLE INTEREST OR PLEASURE IN DOING THINGS: 0
SUM OF ALL RESPONSES TO PHQ9 QUESTIONS 1 & 2: 1
SUM OF ALL RESPONSES TO PHQ QUESTIONS 1-9: 1

## 2019-12-13 ASSESSMENT — ENCOUNTER SYMPTOMS
ABDOMINAL PAIN: 0
COUGH: 0
SORE THROAT: 0
RHINORRHEA: 0
EYE REDNESS: 0
SINUS PRESSURE: 0
EYE DISCHARGE: 0
SHORTNESS OF BREATH: 0
DIARRHEA: 0
NAUSEA: 0
CONSTIPATION: 0
TROUBLE SWALLOWING: 0
VOMITING: 0
WHEEZING: 0

## 2019-12-18 ENCOUNTER — ANTI-COAG VISIT (OUTPATIENT)
Dept: FAMILY MEDICINE CLINIC | Age: 69
End: 2019-12-18
Payer: MEDICARE

## 2019-12-18 DIAGNOSIS — I48.0 PAROXYSMAL ATRIAL FIBRILLATION (HCC): ICD-10-CM

## 2019-12-18 LAB — INR BLD: 2

## 2019-12-18 PROCEDURE — 93793 ANTICOAG MGMT PT WARFARIN: CPT | Performed by: FAMILY MEDICINE

## 2019-12-22 ENCOUNTER — PATIENT MESSAGE (OUTPATIENT)
Dept: FAMILY MEDICINE CLINIC | Age: 69
End: 2019-12-22

## 2019-12-26 ENCOUNTER — ANTI-COAG VISIT (OUTPATIENT)
Dept: FAMILY MEDICINE CLINIC | Age: 69
End: 2019-12-26
Payer: MEDICARE

## 2019-12-26 DIAGNOSIS — I48.0 PAROXYSMAL ATRIAL FIBRILLATION (HCC): ICD-10-CM

## 2019-12-26 LAB — INR BLD: 2

## 2019-12-26 PROCEDURE — 93793 ANTICOAG MGMT PT WARFARIN: CPT | Performed by: FAMILY MEDICINE

## 2020-01-02 ENCOUNTER — ANTI-COAG VISIT (OUTPATIENT)
Dept: FAMILY MEDICINE CLINIC | Age: 70
End: 2020-01-02
Payer: MEDICARE

## 2020-01-02 LAB — INR BLD: 2.2

## 2020-01-02 PROCEDURE — 93793 ANTICOAG MGMT PT WARFARIN: CPT | Performed by: FAMILY MEDICINE

## 2020-01-03 ENCOUNTER — HOSPITAL ENCOUNTER (OUTPATIENT)
Dept: CT IMAGING | Age: 70
Discharge: HOME | End: 2020-01-05
Payer: MEDICARE

## 2020-01-03 PROCEDURE — 71250 CT THORAX DX C-: CPT

## 2020-01-08 ENCOUNTER — ANTI-COAG VISIT (OUTPATIENT)
Dept: FAMILY MEDICINE CLINIC | Age: 70
End: 2020-01-08
Payer: MEDICARE

## 2020-01-08 LAB — INR BLD: 2.2

## 2020-01-08 PROCEDURE — 93793 ANTICOAG MGMT PT WARFARIN: CPT | Performed by: FAMILY MEDICINE

## 2020-01-15 ENCOUNTER — ANTI-COAG VISIT (OUTPATIENT)
Dept: FAMILY MEDICINE CLINIC | Age: 70
End: 2020-01-15
Payer: MEDICARE

## 2020-01-15 ENCOUNTER — OFFICE VISIT (OUTPATIENT)
Dept: OPTOMETRY | Age: 70
End: 2020-01-15
Payer: COMMERCIAL

## 2020-01-15 LAB — INR BLD: 2.3

## 2020-01-15 PROCEDURE — 93793 ANTICOAG MGMT PT WARFARIN: CPT | Performed by: FAMILY MEDICINE

## 2020-01-15 PROCEDURE — 92004 COMPRE OPH EXAM NEW PT 1/>: CPT | Performed by: OPTOMETRIST

## 2020-01-15 ASSESSMENT — REFRACTION_MANIFEST
OS_SPHERE: +1.00
OD_ADD: +2.50
OS_CYLINDER: -1.00
OD_CYLINDER: -0.75
OD_AXIS: 090
OD_SPHERE: PLANO
OS_AXIS: 100
OS_ADD: +2.50

## 2020-01-15 ASSESSMENT — VISUAL ACUITY
OD_CC: 20/20 OU
METHOD: SNELLEN - LINEAR
CORRECTION_TYPE: GLASSES
OS_CC: 20/40

## 2020-01-15 ASSESSMENT — KERATOMETRY
OD_K1POWER_DIOPTERS: 45.50
OS_K1POWER_DIOPTERS: 46.00
OS_AXISANGLE2_DEGREES: 087
OD_AXISANGLE_DEGREES: 171
OD_AXISANGLE2_DEGREES: 081
OD_K2POWER_DIOPTERS: 46.25
OS_K2POWER_DIOPTERS: 46.25
OS_AXISANGLE_DEGREES: 177

## 2020-01-15 ASSESSMENT — REFRACTION_WEARINGRX
OS_CYLINDER: -1.00
OD_SPHERE: -0.25
OS_ADD: +2.50
OD_CYLINDER: -0.75
OS_SPHERE: +0.25
OD_ADD: +2.50
OD_AXIS: 105
SPECS_TYPE: PAL
OS_AXIS: 093

## 2020-01-15 ASSESSMENT — TONOMETRY
OD_IOP_MMHG: 19
OS_IOP_MMHG: 14
IOP_METHOD: NON-CONTACT AIR PUFF

## 2020-01-15 ASSESSMENT — SLIT LAMP EXAM - LIDS
COMMENTS: NORMAL
COMMENTS: NORMAL

## 2020-01-15 NOTE — PROGRESS NOTES
tablet 3    Handicap Placard MISC by Does not apply route Expires 5 years 1 each 0    acetaminophen (TYLENOL) 500 MG tablet Take 500 mg by mouth 2 times daily as needed for Pain      aspirin 81 MG tablet Take 81 mg by mouth daily      warfarin (COUMADIN) 2 MG tablet 1 po daily as directed 90 tablet 3    magnesium oxide (MAG-OX) 400 MG tablet Take 400 mg by mouth 3 times daily. No current facility-administered medications for this visit. Family History   Problem Relation Age of Onset    Other Mother         Total abdominal hysterectomy.     Kidney Disease Mother     Hypertension Mother     High Blood Pressure Mother     High Cholesterol Mother     Hypertension Father     Other Father         Prostate disease    Cancer Father     High Blood Pressure Father     Heart Defect Sister         Murmur    High Blood Pressure Sister     Allergies Daughter     Allergies Daughter     Hypertension Brother     High Blood Pressure Brother     Hypertension Sister     Cataracts Neg Hx     Diabetes Neg Hx     Glaucoma Neg Hx      Social History     Socioeconomic History    Marital status:      Spouse name: None    Number of children: None    Years of education: None    Highest education level: None   Occupational History    None   Social Needs    Financial resource strain: None    Food insecurity:     Worry: None     Inability: None    Transportation needs:     Medical: None     Non-medical: None   Tobacco Use    Smoking status: Former Smoker     Packs/day: 0.25     Years: 20.00     Pack years: 5.00     Types: Cigarettes     Last attempt to quit: 5/15/2009     Years since quitting: 10.6    Smokeless tobacco: Never Used   Substance and Sexual Activity    Alcohol use: No    Drug use: No    Sexual activity: Yes     Partners: Male   Lifestyle    Physical activity:     Days per week: None     Minutes per session: None    Stress: None   Relationships    Social connections: Talks on phone: None     Gets together: None     Attends Sabianism service: None     Active member of club or organization: None     Attends meetings of clubs or organizations: None     Relationship status: None    Intimate partner violence:     Fear of current or ex partner: None     Emotionally abused: None     Physically abused: None     Forced sexual activity: None   Other Topics Concern    None   Social History Narrative    None     Past Medical History:   Diagnosis Date    Allergic rhinitis     Anxiety     Atrial fibrillation ([address]     Heart murmur     Valvuloplasty 2012    Histoplasmosis     By xray criteria.  History of cardioversion 10 times total: last Dec 2012    Hyperlipidemia     Hypertension     Hypokalemia     Hyponatremia     Impaired fasting glucose     Mass     Benign of neck/chin, status postop,now resolved. 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    Dai Bass           Main Ophthalmology Exam     External Exam       Right Left    External Normal Normal          Slit Lamp Exam       Right Left    Lids/Lashes Normal Normal    Conjunctiva/Sclera White and quiet White and quiet    Cornea Clear Clear    Anterior Chamber Deep and quiet Deep and quiet    Iris Round and reactive Round and reactive    Lens 2+ Nuclear sclerosis 2+ Nuclear sclerosis    Vitreous Normal Normal          Fundus Exam       Right Left    Disc Normal Normal    C/D Ratio 0.2 0.2    Macula Normal Normal    Vessels Normal Normal                   Tonometry     Tonometry (Non-contact air puff, 1:40 PM)       Right Left    Pressure 19 14   IOP.1             13.9  CH:  9.8          10.6  WS: 9.2          9.1                  Not recorded         Not recorded          Visual Acuity (Snellen - Linear)       Right Left    Dist cc 20/20 20/40    Near cc 20/20 OU     Correction:

## 2020-01-22 ENCOUNTER — ANTI-COAG VISIT (OUTPATIENT)
Dept: FAMILY MEDICINE CLINIC | Age: 70
End: 2020-01-22
Payer: MEDICARE

## 2020-01-22 LAB — INR BLD: 2.1

## 2020-01-22 PROCEDURE — 93793 ANTICOAG MGMT PT WARFARIN: CPT | Performed by: FAMILY MEDICINE

## 2020-01-29 ENCOUNTER — ANTI-COAG VISIT (OUTPATIENT)
Dept: FAMILY MEDICINE CLINIC | Age: 70
End: 2020-01-29
Payer: MEDICARE

## 2020-01-29 LAB — INR BLD: 1.9

## 2020-01-29 PROCEDURE — 93793 ANTICOAG MGMT PT WARFARIN: CPT | Performed by: FAMILY MEDICINE

## 2020-02-03 ENCOUNTER — PATIENT MESSAGE (OUTPATIENT)
Dept: FAMILY MEDICINE CLINIC | Age: 70
End: 2020-02-03

## 2020-02-03 NOTE — TELEPHONE ENCOUNTER
From: Luz Maria Holt  To: Bhavna Rai DO  Sent: 2/3/2020 9:31 AM EST  Subject: Prescription Question    Good Morning Dr. Rena Bautista,  Friday evening I had a brain fart and took 20mg of Coumadin, I got the bottles mixed up. I did not take my 4mg dose Saturday, nor last evening as my INR as 3.6. My INR is 2.8 this morning. Do you think it is safe to take my regular dose of 4mg this evening? Range is 2-3. Thank God I caught my mistake and have the home testing kit. Thank you!   Avery Houston

## 2020-02-05 ENCOUNTER — ANTI-COAG VISIT (OUTPATIENT)
Dept: FAMILY MEDICINE CLINIC | Age: 70
End: 2020-02-05
Payer: MEDICARE

## 2020-02-05 LAB — INR BLD: 2.5

## 2020-02-05 PROCEDURE — 93793 ANTICOAG MGMT PT WARFARIN: CPT | Performed by: FAMILY MEDICINE

## 2020-02-07 ENCOUNTER — PATIENT MESSAGE (OUTPATIENT)
Dept: FAMILY MEDICINE CLINIC | Age: 70
End: 2020-02-07

## 2020-02-07 RX ORDER — CEPHALEXIN 500 MG/1
500 CAPSULE ORAL 3 TIMES DAILY
Qty: 30 CAPSULE | Refills: 0 | Status: SHIPPED | OUTPATIENT
Start: 2020-02-07 | End: 2020-03-30

## 2020-02-12 ENCOUNTER — ANTI-COAG VISIT (OUTPATIENT)
Dept: FAMILY MEDICINE CLINIC | Age: 70
End: 2020-02-12
Payer: MEDICARE

## 2020-02-12 ENCOUNTER — OFFICE VISIT (OUTPATIENT)
Dept: OPTOMETRY | Age: 70
End: 2020-02-12
Payer: MEDICARE

## 2020-02-12 LAB — INR BLD: 2.3

## 2020-02-12 PROCEDURE — 4040F PNEUMOC VAC/ADMIN/RCVD: CPT | Performed by: OPTOMETRIST

## 2020-02-12 PROCEDURE — 93793 ANTICOAG MGMT PT WARFARIN: CPT | Performed by: FAMILY MEDICINE

## 2020-02-12 PROCEDURE — G8427 DOCREV CUR MEDS BY ELIG CLIN: HCPCS | Performed by: OPTOMETRIST

## 2020-02-12 PROCEDURE — 1036F TOBACCO NON-USER: CPT | Performed by: OPTOMETRIST

## 2020-02-12 PROCEDURE — G8484 FLU IMMUNIZE NO ADMIN: HCPCS | Performed by: OPTOMETRIST

## 2020-02-12 PROCEDURE — 92250 FUNDUS PHOTOGRAPHY W/I&R: CPT | Performed by: OPTOMETRIST

## 2020-02-12 PROCEDURE — 99213 OFFICE O/P EST LOW 20 MIN: CPT | Performed by: OPTOMETRIST

## 2020-02-12 PROCEDURE — 1090F PRES/ABSN URINE INCON ASSESS: CPT | Performed by: OPTOMETRIST

## 2020-02-12 PROCEDURE — 99211 OFF/OP EST MAY X REQ PHY/QHP: CPT

## 2020-02-12 PROCEDURE — 3017F COLORECTAL CA SCREEN DOC REV: CPT | Performed by: OPTOMETRIST

## 2020-02-12 PROCEDURE — G8420 CALC BMI NORM PARAMETERS: HCPCS | Performed by: OPTOMETRIST

## 2020-02-12 PROCEDURE — 1123F ACP DISCUSS/DSCN MKR DOCD: CPT | Performed by: OPTOMETRIST

## 2020-02-12 PROCEDURE — G8399 PT W/DXA RESULTS DOCUMENT: HCPCS | Performed by: OPTOMETRIST

## 2020-02-12 ASSESSMENT — VISUAL ACUITY
METHOD: SNELLEN - LINEAR
OD_CC+: -1
CORRECTION_TYPE: GLASSES
OS_CC: 20/25

## 2020-02-12 ASSESSMENT — REFRACTION_WEARINGRX
OS_CYLINDER: -1.00
OS_ADD: +2.50
OD_CYLINDER: -0.75
OS_SPHERE: +1.00
OD_AXIS: 090
OS_AXIS: 100
SPECS_TYPE: PAL
OD_SPHERE: PLANO
OD_ADD: +2.50

## 2020-02-12 ASSESSMENT — ENCOUNTER SYMPTOMS
GASTROINTESTINAL NEGATIVE: 0
ALLERGIC/IMMUNOLOGIC NEGATIVE: 0
RESPIRATORY NEGATIVE: 0
EYES NEGATIVE: 0

## 2020-02-12 ASSESSMENT — SLIT LAMP EXAM - LIDS
COMMENTS: NORMAL
COMMENTS: NORMAL

## 2020-02-12 NOTE — PATIENT INSTRUCTIONS
No tx needed;   If any curtain over vision or flood of floaters or vision does not return to normal, return asap; otherwise, 1 year complete exam

## 2020-02-19 ENCOUNTER — ANTI-COAG VISIT (OUTPATIENT)
Dept: FAMILY MEDICINE CLINIC | Age: 70
End: 2020-02-19
Payer: MEDICARE

## 2020-02-19 LAB — INR BLD: 2

## 2020-02-19 PROCEDURE — 93793 ANTICOAG MGMT PT WARFARIN: CPT | Performed by: FAMILY MEDICINE

## 2020-02-26 ENCOUNTER — ANTI-COAG VISIT (OUTPATIENT)
Dept: FAMILY MEDICINE CLINIC | Age: 70
End: 2020-02-26
Payer: MEDICARE

## 2020-02-26 LAB — INR BLD: 2.4

## 2020-02-26 PROCEDURE — 93793 ANTICOAG MGMT PT WARFARIN: CPT | Performed by: FAMILY MEDICINE

## 2020-03-04 ENCOUNTER — ANTI-COAG VISIT (OUTPATIENT)
Dept: FAMILY MEDICINE CLINIC | Age: 70
End: 2020-03-04
Payer: MEDICARE

## 2020-03-04 LAB — INR BLD: 2.1

## 2020-03-04 PROCEDURE — 93793 ANTICOAG MGMT PT WARFARIN: CPT | Performed by: FAMILY MEDICINE

## 2020-03-11 ENCOUNTER — ANTI-COAG VISIT (OUTPATIENT)
Dept: FAMILY MEDICINE CLINIC | Age: 70
End: 2020-03-11
Payer: MEDICARE

## 2020-03-11 LAB — INR BLD: 2.5

## 2020-03-11 PROCEDURE — 93793 ANTICOAG MGMT PT WARFARIN: CPT | Performed by: FAMILY MEDICINE

## 2020-03-17 RX ORDER — CLINDAMYCIN HYDROCHLORIDE 300 MG/1
CAPSULE ORAL
Qty: 6 CAPSULE | Refills: 1 | Status: SHIPPED | OUTPATIENT
Start: 2020-03-17 | End: 2020-03-30

## 2020-03-17 RX ORDER — WARFARIN SODIUM 2 MG/1
TABLET ORAL
Qty: 90 TABLET | Refills: 3 | Status: SHIPPED | OUTPATIENT
Start: 2020-03-17 | End: 2020-05-01 | Stop reason: SDUPTHER

## 2020-03-17 NOTE — TELEPHONE ENCOUNTER
Chencho Gonzales called requesting a refill of the below medication which has been pended for you:     Requested Prescriptions     Pending Prescriptions Disp Refills    warfarin (COUMADIN) 2 MG tablet 90 tablet 3     Si po daily as directed       Last Appointment Date: 2019  Next Appointment Date: 3/17/2020    Allergies   Allergen Reactions    Amiodarone Shortness Of Breath     Authoring Clinician or Source System: Alber Rush  Respiratory difficulty    Amoxicillin Anaphylaxis    Beta Adrenergic Blockers Shortness Of Breath    Metoprolol Shortness Of Breath     \"Beta Blockers    Penicillin V Potassium Anaphylaxis    Dronedarone Other (See Comments)     Irregular Heart Beats  Authoring Clinician or Source System: Kyara Schmitt  ([employer])  Edema and irregular heartbeats    Epinephrine Other (See Comments)     dental admin gave her afib  Dental admin gave her AFIB    Lisinopril Other (See Comments)     Eyes were beating along with her heart beat  Eyes were beating along with her heart beat    Statins     Morphine Nausea Only and Nausea And Vomiting     Nausea    Nexium [Esomeprazole Magnesium] Anxiety     Heart flutters more than normal.    Proton Pump Inhibitors Nausea And Vomiting

## 2020-03-17 NOTE — TELEPHONE ENCOUNTER
Kimmy Vogt called requesting a refill of the below medication which has been pended for you:     Requested Prescriptions     Pending Prescriptions Disp Refills    clindamycin (CLEOCIN) 300 MG capsule 6 capsule 1     Sig: Take 600 mg 1 hr prior to dental procedures       Last Appointment Date: 12/13/2019  Next Appointment Date: 6/15/2020    Allergies   Allergen Reactions    Amiodarone Shortness Of Breath     Authoring Clinician or Source System: Keron Salinas  Respiratory difficulty    Amoxicillin Anaphylaxis    Beta Adrenergic Blockers Shortness Of Breath    Metoprolol Shortness Of Breath     \"Beta Blockers    Penicillin V Potassium Anaphylaxis    Dronedarone Other (See Comments)     Irregular Heart Beats  Authoring Clinician or Source System: Kyara Schmitt  ([employer])  Edema and irregular heartbeats    Epinephrine Other (See Comments)     dental admin gave her afib  Dental admin gave her AFIB    Lisinopril Other (See Comments)     Eyes were beating along with her heart beat  Eyes were beating along with her heart beat    Statins     Morphine Nausea Only and Nausea And Vomiting     Nausea    Nexium [Esomeprazole Magnesium] Anxiety     Heart flutters more than normal.    Proton Pump Inhibitors Nausea And Vomiting

## 2020-03-19 ENCOUNTER — ANTI-COAG VISIT (OUTPATIENT)
Dept: FAMILY MEDICINE CLINIC | Age: 70
End: 2020-03-19
Payer: MEDICARE

## 2020-03-19 LAB — INR BLD: 2

## 2020-03-19 PROCEDURE — 93793 ANTICOAG MGMT PT WARFARIN: CPT | Performed by: FAMILY MEDICINE

## 2020-03-25 ENCOUNTER — TELEPHONE (OUTPATIENT)
Dept: FAMILY MEDICINE CLINIC | Age: 70
End: 2020-03-25

## 2020-03-25 ENCOUNTER — ANTI-COAG VISIT (OUTPATIENT)
Dept: FAMILY MEDICINE CLINIC | Age: 70
End: 2020-03-25
Payer: MEDICARE

## 2020-03-25 LAB — INR BLD: 1.9

## 2020-03-25 PROCEDURE — 93793 ANTICOAG MGMT PT WARFARIN: CPT | Performed by: FAMILY MEDICINE

## 2020-03-30 ENCOUNTER — E-VISIT (OUTPATIENT)
Dept: FAMILY MEDICINE CLINIC | Age: 70
End: 2020-03-30
Payer: MEDICARE

## 2020-03-30 PROCEDURE — 99421 OL DIG E/M SVC 5-10 MIN: CPT | Performed by: FAMILY MEDICINE

## 2020-03-30 RX ORDER — CLINDAMYCIN HYDROCHLORIDE 300 MG/1
300 CAPSULE ORAL 3 TIMES DAILY
Qty: 30 CAPSULE | Refills: 0 | Status: SHIPPED | OUTPATIENT
Start: 2020-03-30 | End: 2020-12-16 | Stop reason: SDUPTHER

## 2020-03-30 ASSESSMENT — LIFESTYLE VARIABLES: SMOKING_STATUS: NO, I'VE NEVER SMOKED

## 2020-04-01 ENCOUNTER — ANTI-COAG VISIT (OUTPATIENT)
Dept: FAMILY MEDICINE CLINIC | Age: 70
End: 2020-04-01
Payer: MEDICARE

## 2020-04-01 LAB — INR BLD: 2.7

## 2020-04-01 PROCEDURE — 93793 ANTICOAG MGMT PT WARFARIN: CPT | Performed by: FAMILY MEDICINE

## 2020-04-03 ENCOUNTER — ANTI-COAG VISIT (OUTPATIENT)
Dept: FAMILY MEDICINE CLINIC | Age: 70
End: 2020-04-03
Payer: MEDICARE

## 2020-04-03 LAB — INR BLD: 2.7

## 2020-04-03 PROCEDURE — 93793 ANTICOAG MGMT PT WARFARIN: CPT | Performed by: FAMILY MEDICINE

## 2020-04-07 ENCOUNTER — ANTI-COAG VISIT (OUTPATIENT)
Dept: FAMILY MEDICINE CLINIC | Age: 70
End: 2020-04-07
Payer: MEDICARE

## 2020-04-07 ENCOUNTER — TELEPHONE (OUTPATIENT)
Dept: FAMILY MEDICINE CLINIC | Age: 70
End: 2020-04-07

## 2020-04-07 LAB — INR BLD: 2.1

## 2020-04-07 PROCEDURE — 93793 ANTICOAG MGMT PT WARFARIN: CPT | Performed by: FAMILY MEDICINE

## 2020-04-07 NOTE — LETTER
[facility] of [place] [address]  Phone: [phone]  Fax: Bob Neal DO      2020        To Whom it May Concern: It is my medical opinion that [address] [place] [address] ( 10/01/1972) should remain out of the public as working with the public in her job puts her at greater risk of carrying the covid 19 virus home to her parents who are both immunocompromised. Currently she should remain out of the public through 2446 at which time the situation will be re-evaluated. If you have any questions or concerns, please don't hesitate to call.       Sincerely,      En Yin DO/merly

## 2020-04-07 NOTE — TELEPHONE ENCOUNTER
Spoke with Areli Pinto that letter is complete. She asks that we mail it to her home address and this is done.

## 2020-04-09 RX ORDER — ALPRAZOLAM 0.25 MG/1
TABLET ORAL
Qty: 90 TABLET | Refills: 2 | Status: SHIPPED | OUTPATIENT
Start: 2020-04-09 | End: 2020-07-08

## 2020-04-09 NOTE — TELEPHONE ENCOUNTER
Devin Nation called requesting a refill of the below medication which has been pended for you: last filled 3/6/2020 #90    Requested Prescriptions     Pending Prescriptions Disp Refills    ALPRAZolam (XANAX) 0.25 MG tablet [Pharmacy Med Name: ALPRAZolam Oral Tablet 0.25 MG] 90 tablet 0     Sig: TAKE 1 TABLET BY MOUTH EVERY EIGHT HOURS AS NEEDED FOR anxiety       Last Appointment Date: 12/13/2019  Next Appointment Date: 6/15/2020    Allergies   Allergen Reactions    Amiodarone Shortness Of Breath     Authoring Clinician or Source System: Michael Zaidi  Respiratory difficulty    Amoxicillin Anaphylaxis    Beta Adrenergic Blockers Shortness Of Breath    Metoprolol Shortness Of Breath     \"Beta Blockers    Penicillin V Potassium Anaphylaxis    Dronedarone Other (See Comments)     Irregular Heart Beats  Authoring Clinician or Source System: Kyara Schmitt  ([employer])  Edema and irregular heartbeats    Epinephrine Other (See Comments)     dental admin gave her afib  Dental admin gave her AFIB    Lisinopril Other (See Comments)     Eyes were beating along with her heart beat  Eyes were beating along with her heart beat    Statins     Morphine Nausea Only and Nausea And Vomiting     Nausea    Nexium [Esomeprazole Magnesium] Anxiety     Heart flutters more than normal.    Proton Pump Inhibitors Nausea And Vomiting Closure 3 Information: This tab is for additional flaps and grafts above and beyond our usual structured repairs.  Please note if you enter information here it will not currently bill and you will need to add the billing information manually.

## 2020-04-13 ENCOUNTER — PATIENT MESSAGE (OUTPATIENT)
Dept: FAMILY MEDICINE CLINIC | Age: 70
End: 2020-04-13

## 2020-04-13 RX ORDER — POTASSIUM CHLORIDE 750 MG/1
10 CAPSULE, EXTENDED RELEASE ORAL 2 TIMES DAILY
Qty: 60 CAPSULE | Refills: 5 | Status: SHIPPED | OUTPATIENT
Start: 2020-04-13 | End: 2021-08-16

## 2020-04-15 ENCOUNTER — ANTI-COAG VISIT (OUTPATIENT)
Dept: FAMILY MEDICINE CLINIC | Age: 70
End: 2020-04-15
Payer: MEDICARE

## 2020-04-15 LAB — INR BLD: 2

## 2020-04-15 PROCEDURE — 93793 ANTICOAG MGMT PT WARFARIN: CPT | Performed by: FAMILY MEDICINE

## 2020-04-22 ENCOUNTER — ANTI-COAG VISIT (OUTPATIENT)
Dept: FAMILY MEDICINE CLINIC | Age: 70
End: 2020-04-22

## 2020-04-22 ENCOUNTER — TELEPHONE (OUTPATIENT)
Dept: FAMILY MEDICINE CLINIC | Age: 70
End: 2020-04-22

## 2020-04-22 LAB — INR BLD: 1.9

## 2020-04-29 ENCOUNTER — TELEPHONE (OUTPATIENT)
Dept: FAMILY MEDICINE CLINIC | Age: 70
End: 2020-04-29

## 2020-04-29 ENCOUNTER — ANTI-COAG VISIT (OUTPATIENT)
Dept: FAMILY MEDICINE CLINIC | Age: 70
End: 2020-04-29
Payer: MEDICARE

## 2020-04-29 LAB — INR BLD: 1.9

## 2020-04-29 PROCEDURE — 93793 ANTICOAG MGMT PT WARFARIN: CPT | Performed by: FAMILY MEDICINE

## 2020-05-06 ENCOUNTER — ANTI-COAG VISIT (OUTPATIENT)
Dept: FAMILY MEDICINE CLINIC | Age: 70
End: 2020-05-06
Payer: MEDICARE

## 2020-05-06 LAB — INR BLD: 2.3

## 2020-05-06 PROCEDURE — 93793 ANTICOAG MGMT PT WARFARIN: CPT | Performed by: FAMILY MEDICINE

## 2020-05-13 ENCOUNTER — ANTI-COAG VISIT (OUTPATIENT)
Dept: FAMILY MEDICINE CLINIC | Age: 70
End: 2020-05-13
Payer: MEDICARE

## 2020-05-13 LAB — INR BLD: 2.3

## 2020-05-13 PROCEDURE — 93793 ANTICOAG MGMT PT WARFARIN: CPT | Performed by: FAMILY MEDICINE

## 2020-05-20 ENCOUNTER — ANTI-COAG VISIT (OUTPATIENT)
Dept: FAMILY MEDICINE CLINIC | Age: 70
End: 2020-05-20
Payer: MEDICARE

## 2020-05-20 LAB — INR BLD: 1.9

## 2020-05-20 PROCEDURE — 93793 ANTICOAG MGMT PT WARFARIN: CPT | Performed by: FAMILY MEDICINE

## 2020-05-27 ENCOUNTER — TELEPHONE (OUTPATIENT)
Dept: FAMILY MEDICINE CLINIC | Age: 70
End: 2020-05-27

## 2020-05-27 ENCOUNTER — ANTI-COAG VISIT (OUTPATIENT)
Dept: FAMILY MEDICINE CLINIC | Age: 70
End: 2020-05-27
Payer: MEDICARE

## 2020-05-27 LAB — INR BLD: 1.9

## 2020-05-27 PROCEDURE — 93793 ANTICOAG MGMT PT WARFARIN: CPT | Performed by: FAMILY MEDICINE

## 2020-06-03 ENCOUNTER — ANTI-COAG VISIT (OUTPATIENT)
Dept: FAMILY MEDICINE CLINIC | Age: 70
End: 2020-06-03
Payer: MEDICARE

## 2020-06-03 LAB — INR BLD: 2.6

## 2020-06-03 PROCEDURE — 93793 ANTICOAG MGMT PT WARFARIN: CPT | Performed by: FAMILY MEDICINE

## 2020-06-04 ENCOUNTER — HOSPITAL ENCOUNTER (OUTPATIENT)
Dept: LAB | Age: 70
Discharge: HOME | End: 2020-06-04
Payer: MEDICARE

## 2020-06-04 LAB
ABSOLUTE EOS #: 0.17 K/UL (ref 0–0.44)
ABSOLUTE IMMATURE GRANULOCYTE: <0.03 K/UL (ref 0–0.3)
ABSOLUTE LYMPH #: 1.94 K/UL (ref 1.1–3.7)
ABSOLUTE MONO #: 0.65 K/UL (ref 0.1–1.2)
ALBUMIN SERPL-MCNC: 4.8 G/DL (ref 3.5–5.2)
ALBUMIN/GLOBULIN RATIO: 1.5 (ref 1–2.5)
ALP BLD-CCNC: 79 U/L (ref 35–104)
ALT SERPL-CCNC: 13 U/L (ref 5–33)
ANION GAP SERPL CALCULATED.3IONS-SCNC: 15 MMOL/L (ref 9–17)
AST SERPL-CCNC: 19 U/L
BASOPHILS # BLD: 1 % (ref 0–2)
BASOPHILS ABSOLUTE: 0.04 K/UL (ref 0–0.2)
BILIRUB SERPL-MCNC: 0.42 MG/DL (ref 0.3–1.2)
BUN BLDV-MCNC: 10 MG/DL (ref 8–23)
BUN/CREAT BLD: 17 (ref 9–20)
CALCIUM SERPL-MCNC: 9.8 MG/DL (ref 8.6–10.4)
CHLORIDE BLD-SCNC: 94 MMOL/L (ref 98–107)
CHOLESTEROL/HDL RATIO: 3.4
CHOLESTEROL: 193 MG/DL
CO2: 26 MMOL/L (ref 20–31)
CREAT SERPL-MCNC: 0.58 MG/DL (ref 0.5–0.9)
DIFFERENTIAL TYPE: ABNORMAL
EOSINOPHILS RELATIVE PERCENT: 3 % (ref 1–4)
ESTIMATED AVERAGE GLUCOSE: 117 MG/DL
GFR AFRICAN AMERICAN: >60 ML/MIN
GFR NON-AFRICAN AMERICAN: >60 ML/MIN
GFR SERPL CREATININE-BSD FRML MDRD: ABNORMAL ML/MIN/{1.73_M2}
GFR SERPL CREATININE-BSD FRML MDRD: ABNORMAL ML/MIN/{1.73_M2}
GLUCOSE BLD-MCNC: 99 MG/DL (ref 70–99)
HBA1C MFR BLD: 5.7 % (ref 4.8–5.9)
HCT VFR BLD CALC: 41.6 % (ref 36.3–47.1)
HDLC SERPL-MCNC: 56 MG/DL
HEMOGLOBIN: 13.7 G/DL (ref 11.9–15.1)
IMMATURE GRANULOCYTES: 0 %
LDL CHOLESTEROL: 115 MG/DL (ref 0–130)
LYMPHOCYTES # BLD: 29 % (ref 24–43)
MAGNESIUM: 2 MG/DL (ref 1.6–2.6)
MCH RBC QN AUTO: 31.4 PG (ref 25.2–33.5)
MCHC RBC AUTO-ENTMCNC: 32.9 G/DL (ref 25.2–33.5)
MCV RBC AUTO: 95.4 FL (ref 82.6–102.9)
MONOCYTES # BLD: 10 % (ref 3–12)
NRBC AUTOMATED: 0 PER 100 WBC
PDW BLD-RTO: 11.7 % (ref 11.8–14.4)
PLATELET # BLD: 316 K/UL (ref 138–453)
PLATELET ESTIMATE: ABNORMAL
PMV BLD AUTO: 9.1 FL (ref 8.1–13.5)
POTASSIUM SERPL-SCNC: 4.6 MMOL/L (ref 3.7–5.3)
RBC # BLD: 4.36 M/UL (ref 3.95–5.11)
RBC # BLD: ABNORMAL 10*6/UL
SEG NEUTROPHILS: 57 % (ref 36–65)
SEGMENTED NEUTROPHILS ABSOLUTE COUNT: 3.8 K/UL (ref 1.5–8.1)
SODIUM BLD-SCNC: 135 MMOL/L (ref 135–144)
TOTAL PROTEIN: 7.9 G/DL (ref 6.4–8.3)
TRIGL SERPL-MCNC: 108 MG/DL
VLDLC SERPL CALC-MCNC: NORMAL MG/DL (ref 1–30)
WBC # BLD: 6.6 K/UL (ref 3.5–11.3)
WBC # BLD: ABNORMAL 10*3/UL

## 2020-06-04 PROCEDURE — 83036 HEMOGLOBIN GLYCOSYLATED A1C: CPT

## 2020-06-04 PROCEDURE — 83735 ASSAY OF MAGNESIUM: CPT

## 2020-06-04 PROCEDURE — 80061 LIPID PANEL: CPT

## 2020-06-04 PROCEDURE — 80053 COMPREHEN METABOLIC PANEL: CPT

## 2020-06-04 PROCEDURE — 36415 COLL VENOUS BLD VENIPUNCTURE: CPT

## 2020-06-04 PROCEDURE — 85025 COMPLETE CBC W/AUTO DIFF WBC: CPT

## 2020-06-10 ENCOUNTER — ANTI-COAG VISIT (OUTPATIENT)
Dept: FAMILY MEDICINE CLINIC | Age: 70
End: 2020-06-10
Payer: MEDICARE

## 2020-06-10 LAB — INR BLD: 2.4

## 2020-06-10 PROCEDURE — 93793 ANTICOAG MGMT PT WARFARIN: CPT | Performed by: FAMILY MEDICINE

## 2020-06-15 ENCOUNTER — OFFICE VISIT (OUTPATIENT)
Dept: FAMILY MEDICINE CLINIC | Age: 70
End: 2020-06-15
Payer: MEDICARE

## 2020-06-15 VITALS
BODY MASS INDEX: 22.08 KG/M2 | HEIGHT: 62 IN | WEIGHT: 120 LBS | SYSTOLIC BLOOD PRESSURE: 138 MMHG | HEART RATE: 76 BPM | DIASTOLIC BLOOD PRESSURE: 86 MMHG

## 2020-06-15 PROCEDURE — 1123F ACP DISCUSS/DSCN MKR DOCD: CPT | Performed by: FAMILY MEDICINE

## 2020-06-15 PROCEDURE — 99213 OFFICE O/P EST LOW 20 MIN: CPT | Performed by: FAMILY MEDICINE

## 2020-06-15 PROCEDURE — G8427 DOCREV CUR MEDS BY ELIG CLIN: HCPCS | Performed by: FAMILY MEDICINE

## 2020-06-15 PROCEDURE — 17000 DESTRUCT PREMALG LESION: CPT | Performed by: FAMILY MEDICINE

## 2020-06-15 PROCEDURE — 1090F PRES/ABSN URINE INCON ASSESS: CPT | Performed by: FAMILY MEDICINE

## 2020-06-15 PROCEDURE — 1036F TOBACCO NON-USER: CPT | Performed by: FAMILY MEDICINE

## 2020-06-15 PROCEDURE — G8420 CALC BMI NORM PARAMETERS: HCPCS | Performed by: FAMILY MEDICINE

## 2020-06-15 PROCEDURE — G0439 PPPS, SUBSEQ VISIT: HCPCS | Performed by: FAMILY MEDICINE

## 2020-06-15 PROCEDURE — G8399 PT W/DXA RESULTS DOCUMENT: HCPCS | Performed by: FAMILY MEDICINE

## 2020-06-15 PROCEDURE — 3017F COLORECTAL CA SCREEN DOC REV: CPT | Performed by: FAMILY MEDICINE

## 2020-06-15 PROCEDURE — 4040F PNEUMOC VAC/ADMIN/RCVD: CPT | Performed by: FAMILY MEDICINE

## 2020-06-15 PROCEDURE — G0446 INTENS BEHAVE THER CARDIO DX: HCPCS | Performed by: FAMILY MEDICINE

## 2020-06-15 ASSESSMENT — LIFESTYLE VARIABLES: HOW OFTEN DO YOU HAVE A DRINK CONTAINING ALCOHOL: 0

## 2020-06-15 ASSESSMENT — PATIENT HEALTH QUESTIONNAIRE - PHQ9
SUM OF ALL RESPONSES TO PHQ QUESTIONS 1-9: 0
SUM OF ALL RESPONSES TO PHQ QUESTIONS 1-9: 0

## 2020-06-15 NOTE — PATIENT INSTRUCTIONS
Personalized Preventive Plan for Josefina Jones - 6/15/2020  Medicare offers a range of preventive health benefits. Some of the tests and screenings are paid in full while other may be subject to a deductible, co-insurance, and/or copay. Some of these benefits include a comprehensive review of your medical history including lifestyle, illnesses that may run in your family, and various assessments and screenings as appropriate. After reviewing your medical record and screening and assessments performed today your provider may have ordered immunizations, labs, imaging, and/or referrals for you. A list of these orders (if applicable) as well as your Preventive Care list are included within your After Visit Summary for your review. Other Preventive Recommendations:    · A preventive eye exam performed by an eye specialist is recommended every 1-2 years to screen for glaucoma; cataracts, macular degeneration, and other eye disorders. · A preventive dental visit is recommended every 6 months. · Try to get at least 150 minutes of exercise per week or 10,000 steps per day on a pedometer . · Order or download the FREE \"Exercise & Physical Activity: Your Everyday Guide\" from [employer]. Call 7-469.587.1696 or search [employer] online. · You need 3582-3981 mg of calcium and 7384-0284 IU of vitamin D per day. It is possible to meet your calcium requirement with diet alone, but a vitamin D supplement is usually necessary to meet this goal.  · When exposed to the sun, use a sunscreen that protects against both UVA and UVB radiation with an SPF of 30 or greater. Reapply every 2 to 3 hours or after sweating, drying off with a towel, or swimming. · Always wear a seat belt when traveling in a car. Always wear a helmet when riding a bicycle or motorcycle.

## 2020-06-21 ASSESSMENT — ENCOUNTER SYMPTOMS
RHINORRHEA: 0
CONSTIPATION: 0
VOMITING: 0
SORE THROAT: 0
SINUS PRESSURE: 0
NAUSEA: 0
COUGH: 0
ABDOMINAL PAIN: 0
DIARRHEA: 0
WHEEZING: 0
SHORTNESS OF BREATH: 0
EYE REDNESS: 0
EYE DISCHARGE: 0
TROUBLE SWALLOWING: 0

## 2020-06-21 NOTE — PROGRESS NOTES
6/15/2020     Noe Rubin (:  1950) is a 72 y.o. female, here for evaluation of the following medical concerns:    HPI  Patient comes in today for follow up of chronic health issues Patient is relatively stable overall. Does have a known history of essential hypertension and her blood pressure has been running a little bit high in the evenings. She has been taking 30 mg of her Cardizem which has helped but she is not sure if she is going to need to increase her Cardizem CD to the next highest dose. She is going to continue to monitor for now but will let me know if she needs to make an adjustment to her Cardizem CD. Has a known history of hyperlipidemia and her cholesterol levels are stable and reasonably well controlled with dietary efforts. She has been intolerant to multiple statins so at this point she is just managing this with [place] the counter supplement and dietary efforts. Does have a known history of impaired fasting glucose and her blood sugar are stable and adequately controlled dietary efforts. Has a known history of paroxysmal atrial fibrillation and did undergo procedural intervention in September with maze procedure repair of mitral valve and tricuspid valve and removal of left atrial appendage. Since that time she is remained in normal sinus rhythm. After discussion with both the cardiothoracic surgeon the electrophysiologist and the cardiologist it has been decided it is okay for her to discontinue Coumadin therapy. She does have a known history of anxiety which is stable and controlled on her current medical therapy. Has known history of hypomagnesemia and her magnesium levels are adequately supplemented at this time. Has a lesion to her left ear that she would like me to check.   Previously had to have cryotherapy to the area by the dermatologist secondary to a precancerous lesion and just want to make sure that the lesion that recurred is not a concerning
Patient declined mammogram this year due to recent surgery at site.  Also declined TDAP, Shingles, and Pneumococcal.
([facility]    Anxiety    Hypomagnesemia    Screening for cardiovascular condition  -     [place] Intens behave ther cardio dx, 15 minutes []    Routine general medical examination at a health care facility          The 10-year ASCVD risk score (Linden Simon., et al., 2013) is: 9.7%    Values used to calculate the score:      Age: 72 years      Sex: Female      Is Non- : No      Diabetic: No      Tobacco smoker: No      Systolic Blood Pressure: 374 mmHg      Is BP treated: No      HDL Cholesterol: 56 mg/dL      Total Cholesterol: 193 mg/dL          Cardiovascular Disease Risk Counseling: Assessed the patient's risk to develop cardiovascular disease and reviewed main risk factors. Reviewed steps to reduce disease risk including:   · Quitting tobacco use, reducing amount smoked, or not starting the habit  · Making healthy food choices  · Being physically active and gradualy increasing activity levels   · Reduce weight and determine a healthy BMI goal  · Monitor blood pressure and treat if higher than 140/90 mmHg  · Maintain blood total cholesterol levels under 5 mmol/l or 190 mg/dl  · Maintain LDL cholesterol levels under 3.0 mmol/l or 115 mg/dl   · Control blood glucose levels  · Consider taking aspirin (75 mg daily), once blood pressure is controlled   Provided a follow up plan.   Time spent (minutes): 15 min

## 2020-06-22 ENCOUNTER — ANTI-COAG VISIT (OUTPATIENT)
Dept: FAMILY MEDICINE CLINIC | Age: 70
End: 2020-06-22

## 2020-06-22 PROBLEM — I48.0 PAROXYSMAL ATRIAL FIBRILLATION (HCC): Status: RESOLVED | Noted: 2017-05-30 | Resolved: 2020-06-22

## 2020-07-29 ENCOUNTER — ANTI-COAG VISIT (OUTPATIENT)
Dept: FAMILY MEDICINE CLINIC | Age: 70
End: 2020-07-29

## 2020-08-24 ENCOUNTER — PATIENT MESSAGE (OUTPATIENT)
Dept: FAMILY MEDICINE CLINIC | Age: 70
End: 2020-08-24

## 2020-08-24 NOTE — TELEPHONE ENCOUNTER
From: Patt Urena  To: Cristofer Lopez DO  Sent: 8/24/2020 9:34 AM EDT  Subject: Non-Urgent Medical Question    Good morning Dr. Karlo Dumont you friday concerning my b/p being out of whack. I think I was low on potassium and/or magnesium. I seem to be ok now. I have an upcoming appointment in September with my EP at [address] [place] [address] which will have to be virtual, and I will bring it up to him then. I hope you had a relaxing weekend!   Shannan Cuevas

## 2020-08-26 ENCOUNTER — PATIENT MESSAGE (OUTPATIENT)
Dept: FAMILY MEDICINE CLINIC | Age: 70
End: 2020-08-26

## 2020-08-27 RX ORDER — DILTIAZEM HYDROCHLORIDE 60 MG/1
TABLET, FILM COATED ORAL
Qty: 270 TABLET | Refills: 0 | Status: SHIPPED | OUTPATIENT
Start: 2020-08-27 | End: 2021-03-21 | Stop reason: SDUPTHER

## 2020-08-27 NOTE — TELEPHONE ENCOUNTER
From: Dinah Pryor  To: Lev Lagunas DO  Sent: 8/26/2020 9:39 PM EDT  Subject: Prescription Question    Good morning,   I need a refill on Diltiazem 60mg, take up to 3x's a day sent to [address] [place] [address]. Please and thank you !   Sandra Coe

## 2020-09-06 ENCOUNTER — PATIENT MESSAGE (OUTPATIENT)
Dept: FAMILY MEDICINE CLINIC | Age: 70
End: 2020-09-06

## 2020-09-08 NOTE — TELEPHONE ENCOUNTER
From: Abner Wesley  To: Jean-Paul Santacruz DO  Sent: 9/6/2020 6:52 PM EDT  Subject: Prescription Question    May I please have a script for \"dilTIAZem  mg 24 hr capsule\" sent to [facility], taken 1x a day.   Thank you,  Mk Menendez

## 2020-09-09 RX ORDER — DILTIAZEM HYDROCHLORIDE 180 MG/1
180 CAPSULE, COATED, EXTENDED RELEASE ORAL DAILY
Qty: 90 CAPSULE | Refills: 1 | Status: SHIPPED | OUTPATIENT
Start: 2020-09-09 | End: 2021-06-16

## 2020-09-27 ENCOUNTER — PATIENT MESSAGE (OUTPATIENT)
Dept: FAMILY MEDICINE CLINIC | Age: 70
End: 2020-09-27

## 2020-09-28 NOTE — TELEPHONE ENCOUNTER
From: Dee Ventura  To: Natalee Nye DO  Sent: 9/27/2020 2:52 PM EDT  Subject: Prescription Question    Good morning,   May I please have a prescription with refills on Xanax . 25, \"take 1 up to 3x's a day\" sent to [address] [place] [address]?  It is not on my l list.  Thank you very much,   Raven Nevarez

## 2020-09-29 RX ORDER — ALPRAZOLAM 0.25 MG/1
0.25 TABLET ORAL 3 TIMES DAILY PRN
Qty: 90 TABLET | Refills: 0 | Status: SHIPPED | OUTPATIENT
Start: 2020-09-29 | End: 2020-11-19 | Stop reason: SDUPTHER

## 2020-10-19 ENCOUNTER — HOSPITAL ENCOUNTER (OUTPATIENT)
Dept: LAB | Age: 70
Discharge: HOME | End: 2020-10-19
Payer: MEDICARE

## 2020-10-19 ENCOUNTER — OFFICE VISIT (OUTPATIENT)
Dept: FAMILY MEDICINE CLINIC | Age: 70
End: 2020-10-19
Payer: MEDICARE

## 2020-10-19 ENCOUNTER — HOSPITAL ENCOUNTER (OUTPATIENT)
Dept: GENERAL RADIOLOGY | Age: 70
Discharge: HOME | End: 2020-10-21
Payer: MEDICARE

## 2020-10-19 VITALS
RESPIRATION RATE: 18 BRPM | HEIGHT: 62 IN | HEART RATE: 80 BPM | DIASTOLIC BLOOD PRESSURE: 84 MMHG | SYSTOLIC BLOOD PRESSURE: 124 MMHG | WEIGHT: 121 LBS | BODY MASS INDEX: 22.26 KG/M2 | OXYGEN SATURATION: 95 %

## 2020-10-19 LAB
CALCIUM IONIZED: 1.28 MMOL/L (ref 1.13–1.33)
PTH INTACT: 42.54 PG/ML (ref 15–65)
VITAMIN D 25-HYDROXY: 31.9 NG/ML (ref 30–100)

## 2020-10-19 PROCEDURE — 72100 X-RAY EXAM L-S SPINE 2/3 VWS: CPT

## 2020-10-19 PROCEDURE — G8484 FLU IMMUNIZE NO ADMIN: HCPCS | Performed by: FAMILY MEDICINE

## 2020-10-19 PROCEDURE — G8420 CALC BMI NORM PARAMETERS: HCPCS | Performed by: FAMILY MEDICINE

## 2020-10-19 PROCEDURE — G8427 DOCREV CUR MEDS BY ELIG CLIN: HCPCS | Performed by: FAMILY MEDICINE

## 2020-10-19 PROCEDURE — 99214 OFFICE O/P EST MOD 30 MIN: CPT | Performed by: FAMILY MEDICINE

## 2020-10-19 PROCEDURE — 1090F PRES/ABSN URINE INCON ASSESS: CPT | Performed by: FAMILY MEDICINE

## 2020-10-19 PROCEDURE — 82330 ASSAY OF CALCIUM: CPT

## 2020-10-19 PROCEDURE — 4040F PNEUMOC VAC/ADMIN/RCVD: CPT | Performed by: FAMILY MEDICINE

## 2020-10-19 PROCEDURE — G8399 PT W/DXA RESULTS DOCUMENT: HCPCS | Performed by: FAMILY MEDICINE

## 2020-10-19 PROCEDURE — 72072 X-RAY EXAM THORAC SPINE 3VWS: CPT

## 2020-10-19 PROCEDURE — 99213 OFFICE O/P EST LOW 20 MIN: CPT | Performed by: FAMILY MEDICINE

## 2020-10-19 PROCEDURE — 82306 VITAMIN D 25 HYDROXY: CPT

## 2020-10-19 PROCEDURE — 83970 ASSAY OF PARATHORMONE: CPT

## 2020-10-19 PROCEDURE — 1036F TOBACCO NON-USER: CPT | Performed by: FAMILY MEDICINE

## 2020-10-19 PROCEDURE — 36415 COLL VENOUS BLD VENIPUNCTURE: CPT

## 2020-10-19 PROCEDURE — 1123F ACP DISCUSS/DSCN MKR DOCD: CPT | Performed by: FAMILY MEDICINE

## 2020-10-19 PROCEDURE — 3017F COLORECTAL CA SCREEN DOC REV: CPT | Performed by: FAMILY MEDICINE

## 2020-10-19 RX ORDER — ASPIRIN/CALCIUM/MAG/ALUMINUM 325 MG
TABLET ORAL DAILY PRN
COMMUNITY
End: 2021-12-16

## 2020-10-19 RX ORDER — CLINDAMYCIN HYDROCHLORIDE 300 MG/1
300 CAPSULE ORAL PRN
COMMUNITY
End: 2021-02-26

## 2020-10-19 RX ORDER — FUROSEMIDE 20 MG/1
TABLET ORAL
COMMUNITY
Start: 2020-09-27 | End: 2022-02-18 | Stop reason: SDUPTHER

## 2020-10-20 ENCOUNTER — PATIENT MESSAGE (OUTPATIENT)
Dept: FAMILY MEDICINE CLINIC | Age: 70
End: 2020-10-20

## 2020-10-20 ASSESSMENT — ENCOUNTER SYMPTOMS
RESPIRATORY NEGATIVE: 1
BACK PAIN: 1
EYES NEGATIVE: 1
GASTROINTESTINAL NEGATIVE: 1

## 2020-10-20 NOTE — PROGRESS NOTES
10/19/2020     Darcy Burks (:  1950) is a 72 y.o. female, here for evaluation of the following medical concerns:    HPI    Patient is seen today in follow-up of recent kyphoplasty with increased pain to the lower thoracic and upper lumbar region since procedure. Patient underwent kyphoplasty to the T8 vertebrae on . She was on the table for over an hour per her report and after that time had improvement in the pain that she was exhibiting at T8 but now has had increased flare of pain to her lower thoracic and upper lumbar region. She has known compression fractures at T12 and L1 and questions of perhaps these may have fractured more as that seems to be where her pain is focalized. Pain seems consistent with what she has had with her compression fractures. She states that she has been taking aspirin which is the only thing that seems to help with her pain. She takes 1000 mg of aspirin twice daily. She does not have any GI tolerance issues with this dose. She did ask about getting Norco pain medication which she has not taken in the past but her spouse takes this and he had question whether or not this would be something that would help her. My only issue is that she is on Xanax and I did advise her she cannot take the 2 in combination. She states that she does take her Xanax daily and does not feel that she would be able to go off of this so she had opted not to take any narcotic pain medication. She did question why she continues to have issues with recurrent compression fractures. Patient was questioning about possible parathyroid hormone issues or vitamin D deficiency. We can certainly check this to make sure that there is no abnormality contributing to her recurrent compression fractures. Also she has not had a DEXA scan so I did suggest getting an updated DEXA scan to see what her T score is so we can determine further medical therapy.   She likely will need intervention with differently: TAKE 1/2 TAB BY MOUTH UP TO THREE TIMES DAILY AS NEEDED FOR A-FIB (cheaper cost wise) Yes Maia Bullock DO   potassium chloride (MICRO-K) 10 MEQ extended release capsule Take 1 capsule by mouth 2 times daily  Patient taking differently: Take 10 mEq by mouth daily as needed If she takes lasix prn Yes Maia Bullock DO   melatonin 3 MG TABS tablet Take 3 mg by mouth Yes Historical Provider, MD   Plant Sterols and Stanols (CHOLESTOFF PO) Take 1,800 mg by mouth daily Yes Historical Provider, MD   valACYclovir (VALTREX) 500 MG tablet 1 tab bid Yes Maia Bullock DO        Social History     Tobacco Use    Smoking status: Former Smoker     Packs/day: 0.25     Years: 20.00     Pack years: 5.00     Types: Cigarettes     Last attempt to quit: 5/15/2009     Years since quittin.4    Smokeless tobacco: Never Used    Tobacco comment: social   Substance Use Topics    Alcohol use: No        Vitals:    10/19/20 1123   BP: 124/84   Site: Left Upper Arm   Position: Sitting   Cuff Size: Medium Adult   Pulse: 80   Resp: 18   SpO2: 95%   Weight: 121 lb (54.9 kg)   Height: 5' 2\" (1.575 m)     Estimated body mass index is 22.13 kg/m² as calculated from the following:    Height as of this encounter: 5' 2\" (1.575 m). Weight as of this encounter: 121 lb (54.9 kg). Physical Exam  Vitals signs and nursing note reviewed. Constitutional:       General: She is not in acute distress. Appearance: She is well-developed. She is not diaphoretic. HENT:      Head: Normocephalic and atraumatic. Eyes:      Conjunctiva/sclera: Conjunctivae normal.   Neck:      Musculoskeletal: Normal range of motion. Pulmonary:      Effort: Pulmonary effort is normal.   Musculoskeletal:         General: Tenderness present. No swelling, deformity or signs of injury. Comments: Increased paravertebral muscular tension and tenderness with palpation to the  thoracic and lumbosacral spine.   Does have prominence of the lower thoracic and lumbar spinous processes with examination of the back. Pain is significant with palpation to the mid spine over the lower thoracic and upper lumbar region. Skin:     General: Skin is warm and dry. Coloration: Skin is not pale. Findings: No erythema or rash. Neurological:      Mental Status: She is alert and oriented to person, place, and time. Psychiatric:         Behavior: Behavior normal.         Thought Content: Thought content normal.         Judgment: Judgment normal.         ASSESSMENT/PLAN:  Encounter Diagnoses   Name Primary? 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 Orders Placed This Encounter   Procedures    XR THORACIC SPINE (3 VIEWS)     Standing Status:   Future     Number of Occurrences:   1     Standing Expiration Date:   10/19/2021     Order Specific Question:   Reason for exam:     Answer:   thoracic back pain    XR LUMBAR SPINE (2-3 VIEWS)     Standing Status:   Future     Number of Occurrences:   1     Standing Expiration Date:   10/19/2021     Order Specific Question:   Reason for exam:     Answer:   low back pain    MRI THORACIC SPINE WO CONTRAST     Standing Status:   Future     Standing Expiration Date:   10/19/2021     Order Specific Question:   Reason for exam:     Answer:   thoracic back pain after recent kyphoplasty at T8.   Now pain is located at T12-L1    MRI LUMBAR SPINE WO CONTRAST     Standing Status:   Future     Standing Expiration Date:   10/19/2021     Order Specific Question:   Reason for exam:     Answer:   thoracic back pain after recent kyphoplasty at T8. Now pain is located at T12-L1    DEXA AXIAL SKELETON W VERTEBRAL FX ASST     Standing Status:   Future     Standing Expiration Date:   10/19/2021     Order Specific Question:   Reason for exam:     Answer:   osteopenia with history of 3 compression fractures    PTH, Intact With Ionized Calcium     Standing Status:   Future     Number of Occurrences:   1     Standing Expiration Date:   10/19/2021    Vitamin D 25 Hydroxy     Standing Status:   Future     Number of Occurrences:   1     Standing Expiration Date:   10/19/2021     Did perform and I did personally review her x-rays of her lumbar and thoracic spine with her today. Per my review there appears to be some mild increase in compression deformity to L1. She has some decrease in height of T10 and T11 as well. This appears to be new onset. I do think with the acute flare of pain and known history of compression fracture she does need repeat MRI imaging of the thoracic and lumbar spine for reassessment and likely will need further kyphoplasty intervention due to the acute flare of pain. Patient is to get lab work done as ordered. Will make sure that she does not have a parathyroid issue or vitamin D deficiency contributing to her presumed osteoporosis. We will get an updated DEXA scan and make further recommendations as far as medical intervention. With regards to pain we did talk about possible pain medication but patient is on Xanax and she cannot take narcotic pain medication and benzodiazepines due to this being a high risk combination. She did not feel that she wanted to stop her benzodiazepine at this time and so she is is going to continue with aspirin therapy.   Did advise her to make sure that she eats when she does take the aspirin so it does not cause any GI irritation. Patient is to return to my office as scheduled for her routine medical follow-up visit or sooner if any further problems or symptoms arise. (Please note that portions of this note were completed with a voice recognition program. Efforts were made to edit the dictations but occasionally words are mis-transcribed.)        No follow-ups on file. An electronic signature was used to authenticate this note.     --Stanislav Camara,  on 10/20/2020 at 7:38 AM

## 2020-10-20 NOTE — TELEPHONE ENCOUNTER
From: Mae Mc  To: Augustina Streeter,   Sent: 10/20/2020 10:25 AM EDT  Subject: Visit Follow-Up Question    Good morning Dr. Curry Bumpers,  I read the x-ray report and the interpretation states \"T7\" had the kyphoplasty, when it was T8, so I am confused. Will you still be ordering the MRI?    Thank you,   Marshall Thrasher

## 2020-10-22 ENCOUNTER — APPOINTMENT (OUTPATIENT)
Dept: BONE DENSITY | Age: 70
End: 2020-10-22
Payer: MEDICARE

## 2020-10-26 ENCOUNTER — PATIENT MESSAGE (OUTPATIENT)
Dept: FAMILY MEDICINE CLINIC | Age: 70
End: 2020-10-26

## 2020-10-26 RX ORDER — FAMOTIDINE 20 MG/1
20 TABLET, FILM COATED ORAL 2 TIMES DAILY
Qty: 60 TABLET | Refills: 0 | Status: SHIPPED | OUTPATIENT
Start: 2020-10-26 | End: 2020-12-27 | Stop reason: SDUPTHER

## 2020-10-26 RX ORDER — NAPROXEN 500 MG/1
500 TABLET ORAL 2 TIMES DAILY WITH MEALS
Qty: 60 TABLET | Refills: 0 | Status: SHIPPED | OUTPATIENT
Start: 2020-10-26 | End: 2020-11-19

## 2020-10-26 NOTE — TELEPHONE ENCOUNTER
From: Rosie Lind  To: Abby Daniel,   Sent: 10/26/2020 6:00 PM EDT  Subject: Test Results Question    Thank you Dr. Zoltan Verduzco doesn't make sense to me on the breast and uterus is, I had a MRI there a month ago and those findings weren't noted. Now both those things in a month? I wonder if it is MY result that you got. Maira Carvalhoe      ----- Message -----   [address], DO   Sent:10/26/2020 5:21 PM EDT   To:Sheryl Morgan   Subject:RE: Test Results Question    I can send in a script for naproxen. I did get your MRI of the lumbar spine report, but haven't gotten the thoracic MRI yet. The lumbar MRI shows evidence of chronic compression fractures at T12-L4, but does not note that any of them are acute. I will set you up to see Dr. Geo Andrew to see what he thinks as far as kyphoplasty in any of those vertebrae due to your pain. I will send in a script for naproxen to see if this helps with you pain. I'll also send a script for pepcid since I am not sure if it is easy to find over the counter or if you have any on hand. Also, they did make note that they had seen a Right breast mass. Not sure if you have noticed any nodularity to the right breast. It is recommended that you have a breast mammogram and ultrasound for further evaluation of this area. Also there was some prominence of the uterine lining and they recommended a pelvic ultrasound to evaluate this as well. This may be nothing of concern, but feel that we should just follow up   with additional imaging to determine if there is anything of concern to either of these areas. I will put orders in for these additional tests and they should be calling you to get them scheduled. Also will put the referral in for Dr. Geo Andrew. The medications were sent to [facility]. Please call the office if you have any other questions or if you would like to discuss these findings further. I know that I have thrown a lot at you in one email.      Dc Edward      ----- Message -----   Dedrick Barber   Sent:10/26/2020 4:14 PM EDT   To:Luz Maria Ayala, DO   Subject:Test Results Question    Dr. Cheryl Acosta,  I had the MRI done today and got a copy of the CD. The girl who copied it also put the previous X-ray and MRI on the disk as well to add to the confusion. I dropped the CD off at radiology for them to download so you can see it. I have no idea when Nadine Arriola will put it on my chart or when it will be read. If it is possible for Dr. Isa Womack to do a kyphoplasty this week I am ready. Also, I cannot function on tylenol and aspirin anymore, it wears off within 2-3 hours. Is Naproxen an option? Steroids flip my heart out and narcotics make me sick. I have Tramadol here and all that does is make me hyper. My EP said as long as I took Pepcid I could take anti inflammatories.    Thank you for all your help,  Mao Lopez

## 2020-10-26 NOTE — TELEPHONE ENCOUNTER
From: Carmine Flores  To: Courtney Mann DO  Sent: 10/26/2020 4:14 PM EDT  Subject: Test Results Question    Dr. Markell Malcolm,  I had the MRI done today and got a copy of the CD. The girl who copied it also put the previous X-ray and MRI on the disk as well to add to the confusion. I dropped the CD off at radiology for them to download so you can see it. I have no idea when Tyree Boland will put it on my chart or when it will be read. If it is possible for Dr. Elissa Magana to do a kyphoplasty this week I am ready. Also, I cannot function on tylenol and aspirin anymore, it wears off within 2-3 hours. Is Naproxen an option? Steroids flip my heart out and narcotics make me sick. I have Tramadol here and all that does is make me hyper. My EP said as long as I took Pepcid I could take anti inflammatories.    Thank you for all your help,  Rose Read

## 2020-10-27 ENCOUNTER — PATIENT MESSAGE (OUTPATIENT)
Dept: FAMILY MEDICINE CLINIC | Age: 70
End: 2020-10-27

## 2020-10-27 NOTE — TELEPHONE ENCOUNTER
Patient returned called. Reviewed MRI results with patient. Transferred patient to pain management to schedule kyphoplasty with Dr Jose Aguilar. Patient states she would like to hold off on mammogram and ultrasound follow up until after kyphoplasty as she is not able to stand. Is scheduled with pain management 11/6/2020.

## 2020-10-27 NOTE — TELEPHONE ENCOUNTER
From: Lizet Dodd  To: Mandi Soto DO  Sent: 10/27/2020 10:14 AM EDT  Subject: Test Results Question    Thank you very much DrZoe The sooner the better as I cannot walk without a cane or walker, nor stand alone. Ernesto Johnson      ----- Message -----   [address], DO   Sent:10/27/2020 9:28 AM EDT   To:Sheryl Collins   Subject:RE: Test Results Question    Just now got your thoracic MRI back and this report states that there is a new acute compression deformity at T9. So for this Dr. Demetrio To should be able to address this with kyphoplasty. Let me know if you do not hear from their office in the next 1-2 days and I will help get this coordinated as soon as possible for you. Dr. Ginny Rodriguez      ----- Message -----   [address] [place] [address] Road   Sent:10/26/2020 6:00 PM EDT   To:Luz Maria Choe DO   Subject:Test Results Question    Thank you Dr. Farhana Soto doesn't make sense to me on the breast and uterus is, I had a MRI there a month ago and those findings weren't noted. Now both those things in a month? I wonder if it is MY result that you got. Ernesto Johnson      ----- Message -----   [address], DO   Sent:10/26/2020 5:21 PM EDT   To:Sheryl Collins   Subject:RE: Test Results Question    I can send in a script for naproxen. I did get your MRI of the lumbar spine report, but haven't gotten the thoracic MRI yet. The lumbar MRI shows evidence of chronic compression fractures at T12-L4, but does not note that any of them are acute. I will set you up to see Dr. Demetrio To to see what he thinks as far as kyphoplasty in any of those vertebrae due to your pain. I will send in a script for naproxen to see if this helps with you pain. I'll also send a script for pepcid since I am not sure if it is easy to find over the counter or if you have any on hand. Also, they did make note that they had seen a Right breast mass.  Not sure if you have noticed any nodularity to the right breast. It is recommended that you have a breast mammogram and ultrasound for further evaluation of this area. Also there was some prominence of the uterine lining and they recommended a pelvic ultrasound to evaluate this as well. This may be nothing of concern, but feel that we should just follow up   with additional imaging to determine if there is anything of concern to either of these areas. I will put orders in for these additional tests and they should be calling you to get them scheduled. Also will put the referral in for Dr. Harry Bhakta. The medications were sent to [facility]. Please call the office if you have any other questions or if you would like to discuss these findings further. I know that I have thrown a lot at you in one email. Dr. Jonah Aragon      ----- Message -----   [facility]   Sent:10/26/2020 4:14 PM EDT   To:Luz Maria Gaming Spotted, DO   Subject:Test Results Question    Dr. Pedro Books,  I had the MRI done today and got a copy of the CD. The girl who copied it also put the previous X-ray and MRI on the disk as well to add to the confusion. I dropped the CD off at radiology for them to download so you can see it. I have no idea when [address] will put it on my chart or when it will be read. If it is possible for Dr. Harry Bhakta to do a kyphoplasty this week I am ready. Also, I cannot function on tylenol and aspirin anymore, it wears off within 2-3 hours. Is Naproxen an option? Steroids flip my heart out and narcotics make me sick. I have Tramadol here and all that does is make me hyper. My EP said as long as I took Pepcid I could take anti inflammatories.    Thank you for all your help,  Marta Daugherty

## 2020-10-28 ENCOUNTER — TELEPHONE (OUTPATIENT)
Dept: PAIN MANAGEMENT | Age: 70
End: 2020-10-28

## 2020-10-28 NOTE — TELEPHONE ENCOUNTER
Patient is scheduled to see [place] as a new patient on 11.3.20; pt will need kypho per MRI that was done at [facility]. Pt recently had a Kypho done by Dr Dariana Armendariz at [facility] (please see chart notes) on 10.8.20; Apparently while on the table the way they laid her to perform other kypho she fractured another spot. Writer unsure if you would be willing to take this patient on at this time since she just had Kypho by other provider. Please advise. Hawa Segal, Dr Geovanni White nurse is aware that this could be an issue.   Please let Hawa Segal know once decision is made

## 2020-11-02 NOTE — TELEPHONE ENCOUNTER
Spoke with patient and advised her the soonest I can get her a consult here with our pain managaement is 11/3/2020. They do have a note out to Dr Isa Womack to make sure he is ok doing Kyphoplasty on her since someone else did it already less than 30 days ago. Patient states she will call Dr Love Clemens office back to see how quickly they can get her in and then let me know. No

## 2020-11-09 ENCOUNTER — HOSPITAL ENCOUNTER (OUTPATIENT)
Dept: ULTRASOUND IMAGING | Age: 70
Discharge: HOME | End: 2020-11-11
Payer: MEDICARE

## 2020-11-09 ENCOUNTER — HOSPITAL ENCOUNTER (OUTPATIENT)
Dept: MAMMOGRAPHY | Age: 70
Discharge: HOME | End: 2020-11-11
Payer: MEDICARE

## 2020-11-09 PROCEDURE — 77066 DX MAMMO INCL CAD BI: CPT

## 2020-11-09 PROCEDURE — 76856 US EXAM PELVIC COMPLETE: CPT

## 2020-11-09 PROCEDURE — 76642 ULTRASOUND BREAST LIMITED: CPT

## 2020-11-11 ENCOUNTER — PATIENT MESSAGE (OUTPATIENT)
Dept: FAMILY MEDICINE CLINIC | Age: 70
End: 2020-11-11

## 2020-11-11 NOTE — TELEPHONE ENCOUNTER
From: Arian Thao  To: Alis Dewey DO  Sent: 11/11/2020 9:25 AM EST  Subject: Non-Urgent Medical Question    Good Morning,   I think I need an x-ray on my back to see if T10 & T11 have compressed. I have this nagging pain on the right side of my spine that wraps around my ribs since my kyphoplasty on T9 last Wednesday. It is keeping me up at night and nothing eases the pain. Dr. Any Jorgensen said that T10 & T11 would probably compress after the surgery, that kyphoplasty can cause the 'eileen' effect. Would it show up on x-ray since it has only been a week? I have a DEXA tomorrow at 10:30, maybe I can get an x-ray after if you approve.    Thank you very much,  Elli Booth

## 2020-11-12 ENCOUNTER — HOSPITAL ENCOUNTER (OUTPATIENT)
Dept: BONE DENSITY | Age: 70
Discharge: HOME | End: 2020-11-14
Payer: MEDICARE

## 2020-11-12 ENCOUNTER — HOSPITAL ENCOUNTER (OUTPATIENT)
Dept: GENERAL RADIOLOGY | Age: 70
Discharge: HOME | End: 2020-11-14
Payer: MEDICARE

## 2020-11-12 PROCEDURE — 77085 DXA BONE DENSITY AXL VRT FX: CPT

## 2020-11-12 PROCEDURE — 72072 X-RAY EXAM THORAC SPINE 3VWS: CPT

## 2020-11-19 ENCOUNTER — OFFICE VISIT (OUTPATIENT)
Dept: FAMILY MEDICINE CLINIC | Age: 70
End: 2020-11-19
Payer: MEDICARE

## 2020-11-19 VITALS
WEIGHT: 119 LBS | HEART RATE: 68 BPM | BODY MASS INDEX: 21.9 KG/M2 | SYSTOLIC BLOOD PRESSURE: 110 MMHG | RESPIRATION RATE: 18 BRPM | DIASTOLIC BLOOD PRESSURE: 80 MMHG | HEIGHT: 62 IN | OXYGEN SATURATION: 97 %

## 2020-11-19 PROCEDURE — 1123F ACP DISCUSS/DSCN MKR DOCD: CPT | Performed by: FAMILY MEDICINE

## 2020-11-19 PROCEDURE — G8399 PT W/DXA RESULTS DOCUMENT: HCPCS | Performed by: FAMILY MEDICINE

## 2020-11-19 PROCEDURE — 1036F TOBACCO NON-USER: CPT | Performed by: FAMILY MEDICINE

## 2020-11-19 PROCEDURE — G8484 FLU IMMUNIZE NO ADMIN: HCPCS | Performed by: FAMILY MEDICINE

## 2020-11-19 PROCEDURE — 4040F PNEUMOC VAC/ADMIN/RCVD: CPT | Performed by: FAMILY MEDICINE

## 2020-11-19 PROCEDURE — G8427 DOCREV CUR MEDS BY ELIG CLIN: HCPCS | Performed by: FAMILY MEDICINE

## 2020-11-19 PROCEDURE — G8420 CALC BMI NORM PARAMETERS: HCPCS | Performed by: FAMILY MEDICINE

## 2020-11-19 PROCEDURE — 1090F PRES/ABSN URINE INCON ASSESS: CPT | Performed by: FAMILY MEDICINE

## 2020-11-19 PROCEDURE — 99213 OFFICE O/P EST LOW 20 MIN: CPT

## 2020-11-19 PROCEDURE — 3017F COLORECTAL CA SCREEN DOC REV: CPT | Performed by: FAMILY MEDICINE

## 2020-11-19 PROCEDURE — 99214 OFFICE O/P EST MOD 30 MIN: CPT | Performed by: FAMILY MEDICINE

## 2020-11-19 RX ORDER — ALPRAZOLAM 0.25 MG/1
0.25 TABLET ORAL 3 TIMES DAILY PRN
Qty: 90 TABLET | Refills: 0 | Status: SHIPPED | OUTPATIENT
Start: 2020-11-19 | End: 2020-12-16 | Stop reason: ALTCHOICE

## 2020-11-19 RX ORDER — TERIPARATIDE 250 UG/ML
20 INJECTION, SOLUTION SUBCUTANEOUS DAILY
Qty: 2.24 ML | Refills: 5 | Status: SHIPPED | OUTPATIENT
Start: 2020-11-19 | End: 2021-06-16 | Stop reason: SINTOL

## 2020-11-19 ASSESSMENT — ENCOUNTER SYMPTOMS
GASTROINTESTINAL NEGATIVE: 1
EYES NEGATIVE: 1
BACK PAIN: 1
RESPIRATORY NEGATIVE: 1

## 2020-11-19 NOTE — PROGRESS NOTES
2020     Charlette Tan (:  1950) is a 72 y.o. female, here for evaluation of the following medical concerns:    HPI  Patient comes in today secondary to ongoing issues with recurrent pathologic fractures of the vertebrae related to osteoporosis. Patient did have recent T9 vertebroplasty and then subsequently developed increased pain again to the lower thoracic and lumbar region. Has known other areas of compression noting that T11 and 12 have chronic compression fractures and it is suspected that T10 may also have an acute compression fracture. She also has issues at L1 that may need to be addressed. She did state that Dr. Nevaeh Warner who had done her vertebroplasty said she could just do a CT scan of both areas for further treatment. We did recently perform a DEXA scan which showed that her bone density in her lumbar spine was -3.5. Has osteoporosis in her hips as well but only at a -2.9. With known pathologic fracture and significant osteoporosis of the spine do think that she needs treatment. She had previously been on bisphosphonates but had trouble tolerating these. I do think that Forteo would be a good option for her and she seems agreeable to trying this. She notes that it was not covered by her insurance but I did discuss the fact that she has failed on bisphosphonate therapy in the past we may be able to get it covered for her. With known pathologic fractures I think it would be reasonable to be more aggressive with this. Patient does state that she will be planning on seeing pain management as well post procedure for any additional compression fractures to discuss if there is other options for treatment of her ongoing pain issues. Patient also has a known history of anxiety and does need a refill of her Xanax. She does try to use this sparingly. Patient otherwise has no other acute medical concerns to discuss.     Did review patient's med list, allergies, social history, fam history, pmhx and pshx today as noted in the record. Preventative measures are reviewed today. See health maintenance section for complete preventative plan of care. Review of Systems   Constitutional: Negative. HENT: Negative. Eyes: Negative. Respiratory: Negative. Cardiovascular: Negative. Gastrointestinal: Negative. Musculoskeletal: Positive for back pain and myalgias. Prior to Visit Medications    Medication Sig Taking?  Authorizing Provider   naproxen (EC NAPROSYN) 500 MG EC tablet Take 1 tablet by mouth 2 times daily (with meals)  Emma Bell DO   famotidine (PEPCID) 20 MG tablet Take 1 tablet by mouth 2 times daily  Luz Maria Purvis DO   Aspirin Buf,LoGhu-XdUla-VzSth, (BUFFERED ASPIRIN) 325 MG TABS Take by mouth daily as needed for Pain  Historical Provider, MD   clindamycin (CLEOCIN) 300 MG capsule Take 300 mg by mouth as needed  Historical Provider, MD   furosemide (LASIX) 20 MG tablet take 1 tablet by mouth as needed for weight gain of 3-4 pounds overnight  Historical Provider, MD   dilTIAZem (CARDIZEM CD) 180 MG extended release capsule Take 1 capsule by mouth daily  Luz Maria Purvis DO   dilTIAZem (CARDIZEM) 60 MG tablet TAKE 1 TABLET BY MOUTH UP TO THREE TIMES DAILY AS NEEDED FOR A-FIB  Patient taking differently: TAKE 1/2 TAB BY MOUTH UP TO THREE TIMES DAILY AS NEEDED FOR A-FIB (cheaper cost moses)  Emma Bell DO   potassium chloride (MICRO-K) 10 MEQ extended release capsule Take 1 capsule by mouth 2 times daily  Patient taking differently: Take 10 mEq by mouth daily as needed If she takes lasix prn  Luz Maria Purvis DO   melatonin 3 MG TABS tablet Take 3 mg by mouth  Historical Provider, MD   Plant Sterols and Stanols (CHOLESTOFF PO) Take 1,800 mg by mouth daily  Historical Provider, MD   valACYclovir (VALTREX) 500 MG tablet 1 tab bid  Emma Bell DO        Social History     Tobacco Use    Smoking status: Former Smoker     Packs/day: 0.25 Years: 20.00     Pack years: 5.00     Types: Cigarettes     Last attempt to quit: 5/15/2009     Years since quittin.5    Smokeless tobacco: Never Used    Tobacco comment: social   Substance Use Topics    Alcohol use: No        There were no vitals filed for this visit. Estimated body mass index is 22.13 kg/m² as calculated from the following:    Height as of 10/19/20: 5' 2\" (1.575 m). Weight as of 10/19/20: 121 lb (54.9 kg). Physical Exam  Vitals signs and nursing note reviewed. Constitutional:       General: She is not in acute distress. Appearance: She is well-developed. She is not diaphoretic. HENT:      Head: Normocephalic and atraumatic. Eyes:      Conjunctiva/sclera: Conjunctivae normal.   Neck:      Musculoskeletal: Normal range of motion. Pulmonary:      Effort: Pulmonary effort is normal.   Musculoskeletal: Normal range of motion. General: Tenderness and deformity present. No swelling. Comments: Increased paravertebral muscular tension and tenderness with palpation to the thoracic and lumbosacral spine. Has increased thoracic cage hump on the left with more spasm noted to the musculature in that area. Does have increased kyphosis in the thoracic spine. Skin:     General: Skin is warm and dry. Coloration: Skin is not pale. Findings: No erythema or rash. Neurological:      Mental Status: She is alert and oriented to person, place, and time. Psychiatric:         Behavior: Behavior normal.         Thought Content: Thought content normal.         Judgment: Judgment normal.         ASSESSMENT/PLAN:  Encounter Diagnoses   Name Primary?     Age-related osteoporosis with current pathological fracture, vertebra(e), initial encounter for fracture ([facility] [address]  Yes    Acute midline thoracic back pain     Anxiety     Acute midline low back pain, unspecified whether sciatica present      Orders Placed This Encounter   Medications    ALPRAZolam (XANAX) 0.25 MG tablet     Sig: Take 1 tablet by mouth 3 times daily as needed for Anxiety for up to 30 days. Dispense:  90 tablet     Refill:  0    Teriparatide, Recombinant, (FORTEO) 600 MCG/2.4ML SOPN injection     Sig: Inject 0.08 mLs into the skin daily     Dispense:  2.24 mL     Refill:  5     Orders Placed This Encounter   Procedures    CT THORACIC SPINE WO CONTRAST     Standing Status:   Future     Standing Expiration Date:   11/19/2021     Scheduling Instructions:      Patient wants to do at [address] [place] [address] Specific Question:   Reason for exam:     Answer:   known thoracic compression fracture with new acute pain developing    CT LUMBAR SPINE WO CONTRAST     Standing Status:   Future     Standing Expiration Date:   11/19/2021     Scheduling Instructions:      Patient would like scheduled at [address] [place] [address] Specific Question:   Reason for exam:     Answer:   Lumbar back pain, acutely flared with known history of compression fractures     Controlled Substance Monitoring:    Acute and Chronic Pain Monitoring:   RX Monitoring 11/19/2020   Attestation -   Periodic Controlled Substance Monitoring Possible medication side effects, risk of tolerance/dependence & alternative treatments discussed. ;No signs of potential drug abuse or diversion identified. ;Assessed functional status. Chronic Pain > 80 MEDD Obtained or confirmed \"Medication Contract\" on file. We will start patient on Forteo and see if this is covered by her insurance. Patient is to get CT imaging of the thoracic and lumbar spine. Likely will need additional kyphoplasty. Patient is given a refill of her Xanax. She does use this sparingly for acute anxiety symptoms. Patient may need further pain management evaluation and management. Patient is to return to my office as scheduled for her routine medical follow-up visit or sooner if any further problems or symptoms arise.     (Please note that portions of this note were completed with a voice recognition program. Efforts were made to edit the dictations but occasionally words are mis-transcribed.)        No follow-ups on file. An electronic signature was used to authenticate this note.     --Stanislav Camara DO on 11/19/2020 at 7:16 AM

## 2020-11-20 ENCOUNTER — PATIENT MESSAGE (OUTPATIENT)
Dept: FAMILY MEDICINE CLINIC | Age: 70
End: 2020-11-20

## 2020-11-20 NOTE — TELEPHONE ENCOUNTER
Dr Immanuel Cueva reviewed CT and no new compression fractures seen. Patient  Made aware and will discuss with Dr Cynthia Sanchez.

## 2020-12-14 ENCOUNTER — HOSPITAL ENCOUNTER (OUTPATIENT)
Dept: LAB | Age: 70
Discharge: HOME | End: 2020-12-14
Payer: MEDICARE

## 2020-12-14 LAB
ABSOLUTE EOS #: 0.18 K/UL (ref 0–0.44)
ABSOLUTE IMMATURE GRANULOCYTE: 0.05 K/UL (ref 0–0.3)
ABSOLUTE LYMPH #: 1.42 K/UL (ref 1.1–3.7)
ABSOLUTE MONO #: 0.77 K/UL (ref 0.1–1.2)
ALBUMIN SERPL-MCNC: 4.6 G/DL (ref 3.5–5.2)
ALBUMIN/GLOBULIN RATIO: 1.4 (ref 1–2.5)
ALP BLD-CCNC: 114 U/L (ref 35–104)
ALT SERPL-CCNC: 16 U/L (ref 5–33)
ANION GAP SERPL CALCULATED.3IONS-SCNC: 12 MMOL/L (ref 9–17)
AST SERPL-CCNC: 18 U/L
BASOPHILS # BLD: 1 % (ref 0–2)
BASOPHILS ABSOLUTE: 0.06 K/UL (ref 0–0.2)
BILIRUB SERPL-MCNC: 0.36 MG/DL (ref 0.3–1.2)
BUN BLDV-MCNC: 7 MG/DL (ref 8–23)
BUN/CREAT BLD: 13 (ref 9–20)
CALCIUM SERPL-MCNC: 9.9 MG/DL (ref 8.6–10.4)
CHLORIDE BLD-SCNC: 97 MMOL/L (ref 98–107)
CO2: 26 MMOL/L (ref 20–31)
CREAT SERPL-MCNC: 0.54 MG/DL (ref 0.5–0.9)
DIFFERENTIAL TYPE: ABNORMAL
EOSINOPHILS RELATIVE PERCENT: 2 % (ref 1–4)
ESTIMATED AVERAGE GLUCOSE: 114 MG/DL
GFR AFRICAN AMERICAN: >60 ML/MIN
GFR NON-AFRICAN AMERICAN: >60 ML/MIN
GFR SERPL CREATININE-BSD FRML MDRD: ABNORMAL ML/MIN/{1.73_M2}
GFR SERPL CREATININE-BSD FRML MDRD: ABNORMAL ML/MIN/{1.73_M2}
GLUCOSE BLD-MCNC: 116 MG/DL (ref 70–99)
HBA1C MFR BLD: 5.6 % (ref 4.8–5.9)
HCT VFR BLD CALC: 40.7 % (ref 36.3–47.1)
HEMOGLOBIN: 12.7 G/DL (ref 11.9–15.1)
IMMATURE GRANULOCYTES: 1 %
LYMPHOCYTES # BLD: 18 % (ref 24–43)
MAGNESIUM: 2.2 MG/DL (ref 1.6–2.6)
MCH RBC QN AUTO: 30.6 PG (ref 25.2–33.5)
MCHC RBC AUTO-ENTMCNC: 31.2 G/DL (ref 25.2–33.5)
MCV RBC AUTO: 98.1 FL (ref 82.6–102.9)
MONOCYTES # BLD: 10 % (ref 3–12)
NRBC AUTOMATED: 0 PER 100 WBC
PDW BLD-RTO: 12 % (ref 11.8–14.4)
PLATELET # BLD: 277 K/UL (ref 138–453)
PLATELET ESTIMATE: ABNORMAL
PMV BLD AUTO: 8.7 FL (ref 8.1–13.5)
POTASSIUM SERPL-SCNC: 4.7 MMOL/L (ref 3.7–5.3)
RBC # BLD: 4.15 M/UL (ref 3.95–5.11)
RBC # BLD: ABNORMAL 10*6/UL
SEG NEUTROPHILS: 68 % (ref 36–65)
SEGMENTED NEUTROPHILS ABSOLUTE COUNT: 5.34 K/UL (ref 1.5–8.1)
SODIUM BLD-SCNC: 135 MMOL/L (ref 135–144)
TOTAL PROTEIN: 7.9 G/DL (ref 6.4–8.3)
WBC # BLD: 7.8 K/UL (ref 3.5–11.3)
WBC # BLD: ABNORMAL 10*3/UL

## 2020-12-14 PROCEDURE — 80061 LIPID PANEL: CPT

## 2020-12-14 PROCEDURE — 83036 HEMOGLOBIN GLYCOSYLATED A1C: CPT

## 2020-12-14 PROCEDURE — 36415 COLL VENOUS BLD VENIPUNCTURE: CPT

## 2020-12-14 PROCEDURE — 85025 COMPLETE CBC W/AUTO DIFF WBC: CPT

## 2020-12-14 PROCEDURE — 83735 ASSAY OF MAGNESIUM: CPT

## 2020-12-14 PROCEDURE — 80053 COMPREHEN METABOLIC PANEL: CPT

## 2020-12-15 LAB
CHOLESTEROL/HDL RATIO: 3.1
CHOLESTEROL: 165 MG/DL
HDLC SERPL-MCNC: 53 MG/DL
LDL CHOLESTEROL: 90 MG/DL (ref 0–130)
TRIGL SERPL-MCNC: 112 MG/DL
VLDLC SERPL CALC-MCNC: NORMAL MG/DL (ref 1–30)

## 2020-12-16 ENCOUNTER — OFFICE VISIT (OUTPATIENT)
Dept: FAMILY MEDICINE CLINIC | Age: 70
End: 2020-12-16
Payer: MEDICARE

## 2020-12-16 VITALS
RESPIRATION RATE: 18 BRPM | HEART RATE: 84 BPM | BODY MASS INDEX: 21.9 KG/M2 | OXYGEN SATURATION: 97 % | SYSTOLIC BLOOD PRESSURE: 116 MMHG | DIASTOLIC BLOOD PRESSURE: 80 MMHG | HEIGHT: 62 IN | WEIGHT: 119 LBS

## 2020-12-16 PROCEDURE — 3017F COLORECTAL CA SCREEN DOC REV: CPT | Performed by: FAMILY MEDICINE

## 2020-12-16 PROCEDURE — G8420 CALC BMI NORM PARAMETERS: HCPCS | Performed by: FAMILY MEDICINE

## 2020-12-16 PROCEDURE — 1123F ACP DISCUSS/DSCN MKR DOCD: CPT | Performed by: FAMILY MEDICINE

## 2020-12-16 PROCEDURE — G8399 PT W/DXA RESULTS DOCUMENT: HCPCS | Performed by: FAMILY MEDICINE

## 2020-12-16 PROCEDURE — G8427 DOCREV CUR MEDS BY ELIG CLIN: HCPCS | Performed by: FAMILY MEDICINE

## 2020-12-16 PROCEDURE — 99214 OFFICE O/P EST MOD 30 MIN: CPT | Performed by: FAMILY MEDICINE

## 2020-12-16 PROCEDURE — 4040F PNEUMOC VAC/ADMIN/RCVD: CPT | Performed by: FAMILY MEDICINE

## 2020-12-16 PROCEDURE — G8484 FLU IMMUNIZE NO ADMIN: HCPCS | Performed by: FAMILY MEDICINE

## 2020-12-16 PROCEDURE — 1036F TOBACCO NON-USER: CPT | Performed by: FAMILY MEDICINE

## 2020-12-16 PROCEDURE — 1090F PRES/ABSN URINE INCON ASSESS: CPT | Performed by: FAMILY MEDICINE

## 2020-12-16 RX ORDER — CLINDAMYCIN HYDROCHLORIDE 300 MG/1
300 CAPSULE ORAL 3 TIMES DAILY
Qty: 30 CAPSULE | Refills: 0 | Status: SHIPPED | OUTPATIENT
Start: 2020-12-16 | End: 2020-12-26

## 2020-12-16 RX ORDER — TIZANIDINE 4 MG/1
4 TABLET ORAL 3 TIMES DAILY PRN
Qty: 60 TABLET | Refills: 1 | Status: SHIPPED | OUTPATIENT
Start: 2020-12-16 | End: 2021-06-16

## 2020-12-16 ASSESSMENT — PATIENT HEALTH QUESTIONNAIRE - PHQ9
SUM OF ALL RESPONSES TO PHQ9 QUESTIONS 1 & 2: 2
1. LITTLE INTEREST OR PLEASURE IN DOING THINGS: 0
SUM OF ALL RESPONSES TO PHQ QUESTIONS 1-9: 2
2. FEELING DOWN, DEPRESSED OR HOPELESS: 2
SUM OF ALL RESPONSES TO PHQ QUESTIONS 1-9: 2
SUM OF ALL RESPONSES TO PHQ QUESTIONS 1-9: 2

## 2020-12-16 ASSESSMENT — ENCOUNTER SYMPTOMS
SHORTNESS OF BREATH: 0
SORE THROAT: 0
BACK PAIN: 1
ABDOMINAL PAIN: 0
SINUS PRESSURE: 0
WHEEZING: 0
EYE DISCHARGE: 0
EYE REDNESS: 0
NAUSEA: 0
RHINORRHEA: 0
COUGH: 0
VOMITING: 0
CONSTIPATION: 0
DIARRHEA: 0
TROUBLE SWALLOWING: 0

## 2020-12-16 NOTE — PROGRESS NOTES
2020     Ja Elliott (:  1950) is a 72 y.o. female, here for evaluation of the following medical concerns:    HPI  Patient comes in today for follow up of chronic health issues Patient did recently undergo kyphoplasty of T10 and T12. She still having ongoing pain issues. She wonders if she stops her alprazolam if she cannot take pain medication. We did talk about different pain medication she could try but after discussion she is intolerant to several different pain medications in the opioid category so cannot guarantee that she would not have similar reactions to any of the opioids. She did question about a muscle relaxer I do think this may be a good option for her. I did advise her that she can still use her Xanax as needed with the muscle relaxer. With regards to her chronic health conditions she has a known history of impaired fasting glucose and her blood sugar is stable and controlled with her current dietary intake. Is to continue with low-carb/low sugar diet and routine exercise as able for optimal blood sugar control. Has a known history of hypertension her blood pressure is stable and controlled on her current medical therapy. Has a known history of hyperlipidemia and her cholesterol levels are within normal limits with her current supplemental therapy. She does have a known history of anxiety which is stable at this time. Has a known history of hypomagnesemia and her magnesium level is adequately supplemented at this time. Patient is concerned that her recent kyphoplasty she did not get the metallic taste in her mouth after she got the clindamycin. She did note that her segmented neutrophils were elevated and so she is concerned about possible underlying infection. We can give her clindamycin as she is taking this in the past to make sure that there is no secondary infection developing. otherwise no other acute medical concerns. .  Patient's recent lab reports are as follows:    Results for orders placed or performed during the hospital encounter of 12/14/20   Magnesium   Result Value Ref Range    Magnesium 2.2 1.6 - 2.6 mg/dL   Lipid Panel   Result Value Ref Range    Cholesterol 165 <200 mg/dL    HDL 53 >40 mg/dL    LDL Cholesterol 90 0 - 130 mg/dL    Chol/HDL Ratio 3.1 <5    Triglycerides 112 <150 mg/dL    VLDL NOT REPORTED 1 - 30 mg/dL   Hemoglobin A1C   Result Value Ref Range    Hemoglobin A1C 5.6 4.8 - 5.9 %    Estimated Avg Glucose 114 mg/dL   Comprehensive Metabolic Panel   Result Value Ref Range    Glucose 116 (H) 70 - 99 mg/dL    BUN 7 (L) 8 - 23 mg/dL    CREATININE 0.54 0.50 - 0.90 mg/dL    Bun/Cre Ratio 13 9 - 20    Calcium 9.9 8.6 - 10.4 mg/dL    Sodium 135 135 - 144 mmol/L    Potassium 4.7 3.7 - 5.3 mmol/L    Chloride 97 (L) 98 - 107 mmol/L    CO2 26 20 - 31 mmol/L    Anion Gap 12 9 - 17 mmol/L    Alkaline Phosphatase 114 (H) 35 - 104 U/L    ALT 16 5 - 33 U/L    AST 18 <32 U/L    Total Bilirubin 0.36 0.3 - 1.2 mg/dL    Total Protein 7.9 6.4 - 8.3 g/dL    Alb 4.6 3.5 - 5.2 g/dL    Albumin/Globulin Ratio 1.4 1.0 - 2.5    GFR Non-African American >60 >60 mL/min    GFR African American >60 >60 mL/min    GFR Comment          GFR Staging NOT REPORTED    CBC Auto Differential   Result Value Ref Range    WBC 7.8 3.5 - 11.3 k/uL    RBC 4.15 3.95 - 5.11 m/uL    Hemoglobin 12.7 11.9 - 15.1 g/dL    Hematocrit 40.7 36.3 - 47.1 %    MCV 98.1 82.6 - 102.9 fL    MCH 30.6 25.2 - 33.5 pg    MCHC 31.2 25.2 - 33.5 g/dL    RDW 12.0 11.8 - 14.4 %    Platelets 716 961 - 701 k/uL    MPV 8.7 8.1 - 13.5 fL    NRBC Automated 0.0 0.0 per 100 WBC    Differential Type NOT REPORTED     Seg Neutrophils 68 (H) 36 - 65 %    Lymphocytes 18 (L) 24 - 43 %    Monocytes 10 3 - 12 %    Eosinophils % 2 1 - 4 %    Basophils 1 0 - 2 %    Immature Granulocytes 1 (H) 0 %    Segs Absolute 5.34 1.50 - 8.10 k/uL    Absolute Lymph # 1.42 1.10 - 3.70 k/uL    Absolute Mono # 0.77 0.10 - 1.20 k/uL    Absolute Eos # Provider, MD   clindamycin (CLEOCIN) 300 MG capsule Take 300 mg by mouth as needed  Historical Provider, MD   furosemide (LASIX) 20 MG tablet take 1 tablet by mouth as needed for weight gain of 3-4 pounds overnight  Historical Provider, MD   dilTIAZem (CARDIZEM CD) 180 MG extended release capsule Take 1 capsule by mouth daily  Luz Maria Pruvis DO   dilTIAZem (CARDIZEM) 60 MG tablet TAKE 1 TABLET BY MOUTH UP TO THREE TIMES DAILY AS NEEDED FOR A-FIB  Patient taking differently: TAKE 1/2 TAB BY MOUTH UP TO THREE TIMES DAILY AS NEEDED FOR A-FIB (cheaper cost wise)  Hannah Medicine,    potassium chloride (MICRO-K) 10 MEQ extended release capsule Take 1 capsule by mouth 2 times daily  Patient taking differently: Take 10 mEq by mouth daily as needed If she takes lasix prn  Luz Maria Ham DO   melatonin 3 MG TABS tablet Take 3 mg by mouth  Historical Provider, MD   Plant Sterols and Stanols (CHOLESTOFF PO) Take 1,800 mg by mouth daily  Historical Provider, MD   valACYclovir (VALTREX) 500 MG tablet 1 tab bid  Patient not taking: Reported on 2020  Hannah MedicineDO        Social History     Tobacco Use    Smoking status: Former Smoker     Packs/day: 0.25     Years: 20.00     Pack years: 5.00     Types: Cigarettes     Quit date: 5/15/2009     Years since quittin.5    Smokeless tobacco: Never Used    Tobacco comment: social   Substance Use Topics    Alcohol use: No        There were no vitals filed for this visit. Estimated body mass index is 21.77 kg/m² as calculated from the following:    Height as of 20: 5' 2\" (1.575 m). Weight as of 20: 119 lb (54 kg). Physical Exam  Vitals signs and nursing note reviewed. Constitutional:       General: She is not in acute distress. Appearance: Normal appearance. She is well-developed. She is not diaphoretic. HENT:      Head: Normocephalic and atraumatic.       Right Ear: Tympanic membrane, ear canal and external ear normal.      Left Ear: Tympanic membrane, ear canal and external ear normal.      Nose: Nose normal.      Mouth/Throat:      Mouth: Mucous membranes are moist.      Pharynx: Oropharynx is clear. No oropharyngeal exudate. Eyes:      General:         Right eye: No discharge. Left eye: No discharge. Conjunctiva/sclera: Conjunctivae normal.      Pupils: Pupils are equal, round, and reactive to light. Neck:      Musculoskeletal: Normal range of motion and neck supple. Thyroid: No thyromegaly. Cardiovascular:      Rate and Rhythm: Normal rate and regular rhythm. Heart sounds: Normal heart sounds. Pulmonary:      Effort: Pulmonary effort is normal.      Breath sounds: Normal breath sounds. No wheezing or rales. Abdominal:      General: Bowel sounds are normal. There is no distension. Palpations: Abdomen is soft. Tenderness: There is no abdominal tenderness. Lymphadenopathy:      Cervical: No cervical adenopathy. Skin:     General: Skin is warm and dry. Findings: No rash. Neurological:      Mental Status: She is alert and oriented to person, place, and time. Psychiatric:         Behavior: Behavior normal.         Thought Content: Thought content normal.         Judgment: Judgment normal.           ASSESSMENT/PLAN:    Encounter Diagnoses   Name Primary?     Impaired fasting glucose Yes    Essential hypertension     Mixed hyperlipidemia     Anxiety     Hypomagnesemia     Age-related osteoporosis with current pathological fracture, vertebra(e), initial encounter for fracture ([facility] [address]       Orders Placed This Encounter   Medications    clindamycin (CLEOCIN) 300 MG capsule     Sig: Take 1 capsule by mouth 3 times daily for 10 days     Dispense:  30 capsule     Refill:  0    tiZANidine (ZANAFLEX) 4 MG tablet     Sig: Take 1 tablet by mouth 3 times daily as needed (back pain/spasm)     Dispense:  60 tablet     Refill:  1     Orders Placed This Encounter   Procedures    CBC Auto Differential     Standing Status:   Future     Standing Expiration Date:   12/16/2021    Comprehensive Metabolic Panel     Standing Status:   Future     Standing Expiration Date:   12/16/2021    Magnesium     Standing Status:   Future     Standing Expiration Date:   12/16/2021    Lipid Panel     Standing Status:   Future     Standing Expiration Date:   12/16/2021     Order Specific Question:   Is Patient Fasting?/# of Hours     Answer:   12 hours     Patient is given Zanaflex to see if this will help with any of the back pain and spasm. Patient is given antibiotic therapy to ensure there is no underlying infection. Patient is to continue on the rest of her current medical therapy. No changes are made at this time. Patient is to continue to follow a low-carb/low sugar/low-fat diet and increase exercise for optimal blood sugar and cholesterol control. Patient is to return to my office in 6 months duration or sooner if any further problems or symptoms arise. (Please note that portions of this note were completed with a voice recognition program. Efforts were made to edit the dictations but occasionally words are mis-transcribed.)      No follow-ups on file. An electronic signature was used to authenticate this note.     --Courtney Mann, DO on 12/16/2020 at 7:19 AM

## 2020-12-16 NOTE — PATIENT INSTRUCTIONS
Hospital Outpatient Visit on 12/14/2020   Component Date Value Ref Range Status    Magnesium 12/14/2020 2.2  1.6 - 2.6 mg/dL Final    Cholesterol 12/14/2020 165  <200 mg/dL Final    Comment:    Cholesterol Guidelines:      <200  Desirable   200-240  Borderline      >240  Undesirable         HDL 12/14/2020 53  >40 mg/dL Final    Comment:    HDL Guidelines:    <40     Undesirable   40-59    Borderline    >59     Desirable         LDL Cholesterol 12/14/2020 90  0 - 130 mg/dL Final    Comment:    LDL Guidelines:     <100    Desirable   100-129   Near to/above Desirable   130-159   Borderline      >159   Undesirable     Direct (measured) LDL and calculated LDL are not interchangeable tests.  Chol/HDL Ratio 12/14/2020 3.1  <5 Final            Triglycerides 12/14/2020 112  <150 mg/dL Final    Comment:    Triglyceride Guidelines:     <150   Desirable   150-199  Borderline   200-499  High     >499   Very high   Based on AHA Guidelines for fasting triglyceride, October 2012.          VLDL 12/14/2020 NOT REPORTED  1 - 30 mg/dL Final    Hemoglobin A1C 12/14/2020 5.6  4.8 - 5.9 % Final    Estimated Avg Glucose 12/14/2020 114  mg/dL Final    Glucose 12/14/2020 116* 70 - 99 mg/dL Final    BUN 12/14/2020 7* 8 - 23 mg/dL Final    CREATININE 12/14/2020 0.54  0.50 - 0.90 mg/dL Final    Bun/Cre Ratio 12/14/2020 13  9 - 20 Final    Calcium 12/14/2020 9.9  8.6 - 10.4 mg/dL Final    Sodium 12/14/2020 135  135 - 144 mmol/L Final    Potassium 12/14/2020 4.7  3.7 - 5.3 mmol/L Final    Chloride 12/14/2020 97* 98 - 107 mmol/L Final    CO2 12/14/2020 26  20 - 31 mmol/L Final    Anion Gap 12/14/2020 12  9 - 17 mmol/L Final    Alkaline Phosphatase 12/14/2020 114* 35 - 104 U/L Final    ALT 12/14/2020 16  5 - 33 U/L Final    AST 12/14/2020 18  <32 U/L Final    Total Bilirubin 12/14/2020 0.36  0.3 - 1.2 mg/dL Final    Total Protein 12/14/2020 7.9  6.4 - 8.3 g/dL Final    Alb 12/14/2020 4.6  3.5 - 5.2 g/dL Final    Albumin/Globulin Ratio 12/14/2020 1.4  1.0 - 2.5 Final    GFR Non- 12/14/2020 >60  >60 mL/min Final    GFR  12/14/2020 >60  >60 mL/min Final    GFR Comment 12/14/2020        Final    Comment: Average GFR for 61-76 years old:   80 mL/min/1.73sq m  Chronic Kidney Disease:   <60 mL/min/1.73sq m  Kidney failure:   <15 mL/min/1.73sq m              eGFR calculated using average adult body mass.  Additional eGFR calculator available at:        [employer]            GFR Staging 12/14/2020 NOT REPORTED   Final    WBC 12/14/2020 7.8  3.5 - 11.3 k/uL Final    RBC 12/14/2020 4.15  3.95 - 5.11 m/uL Final    Hemoglobin 12/14/2020 12.7  11.9 - 15.1 g/dL Final    Hematocrit 12/14/2020 40.7  36.3 - 47.1 % Final    MCV 12/14/2020 98.1  82.6 - 102.9 fL Final    MCH 12/14/2020 30.6  25.2 - 33.5 pg Final    MCHC 12/14/2020 31.2  25.2 - 33.5 g/dL Final    RDW 12/14/2020 12.0  11.8 - 14.4 % Final    Platelets 57/78/6016 277  138 - 453 k/uL Final    MPV 12/14/2020 8.7  8.1 - 13.5 fL Final    NRBC Automated 12/14/2020 0.0  0.0 per 100 WBC Final    Differential Type 12/14/2020 NOT REPORTED   Final    Seg Neutrophils 12/14/2020 68* 36 - 65 % Final    Lymphocytes 12/14/2020 18* 24 - 43 % Final    Monocytes 12/14/2020 10  3 - 12 % Final    Eosinophils % 12/14/2020 2  1 - 4 % Final    Basophils 12/14/2020 1  0 - 2 % Final    Immature Granulocytes 12/14/2020 1* 0 % Final    Segs Absolute 12/14/2020 5.34  1.50 - 8.10 k/uL Final    Absolute Lymph # 12/14/2020 1.42  1.10 - 3.70 k/uL Final    Absolute Mono # 12/14/2020 0.77  0.10 - 1.20 k/uL Final    Absolute Eos # 12/14/2020 0.18  0.00 - 0.44 k/uL Final    Basophils Absolute 12/14/2020 0.06  0.00 - 0.20 k/uL Final    Absolute Immature Granulocyte 12/14/2020 0.05  0.00 - 0.30 k/uL Final    WBC Morphology 12/14/2020 NOT REPORTED   Final    RBC Morphology 12/14/2020 NOT REPORTED   Final    Platelet Estimate 12/14/2020 NOT REPORTED   Final

## 2020-12-27 ENCOUNTER — PATIENT MESSAGE (OUTPATIENT)
Dept: FAMILY MEDICINE CLINIC | Age: 70
End: 2020-12-27

## 2020-12-29 RX ORDER — ALPRAZOLAM 0.25 MG/1
0.25 TABLET ORAL 3 TIMES DAILY PRN
Qty: 90 TABLET | Refills: 0 | Status: SHIPPED | OUTPATIENT
Start: 2020-12-29 | End: 2021-02-23

## 2020-12-29 RX ORDER — FAMOTIDINE 20 MG/1
20 TABLET, FILM COATED ORAL 2 TIMES DAILY
Qty: 60 TABLET | Refills: 1 | Status: SHIPPED | OUTPATIENT
Start: 2020-12-29 | End: 2021-03-17 | Stop reason: SDUPTHER

## 2020-12-29 NOTE — TELEPHONE ENCOUNTER
Santino Garza called requesting a refill of the below medication which has been pended for you: last xanax fill 11/19/2020 #90    Requested Prescriptions      No prescriptions requested or ordered in this encounter       Last Appointment Date: 12/16/2020  Next Appointment Date: 6/16/2021    Allergies   Allergen Reactions    Amiodarone Shortness Of Breath     Authoring Clinician or Source System: Elizabet Shaw  Respiratory difficulty    Amoxicillin Anaphylaxis    Beta Adrenergic Blockers Shortness Of Breath    Metoprolol Shortness Of Breath     \"Beta Blockers    Penicillin V Potassium Anaphylaxis    Lisinopril Other (See Comments)     Eyes were beating along with her heart beat/HTN    Dronedarone Other (See Comments)     Irregular Heart Beats  Authoring Clinician or Source System: Kyara Schmitt  ([place])  Edema and irregular heartbeats    Epinephrine Other (See Comments)     dental admin gave her afib      Statins     Morphine Nausea Only, Nausea And Vomiting and Other (See Comments)     Nausea  Other reaction(s): GI Upset  Nausea    Nexium [Esomeprazole Magnesium] Anxiety     Heart flutters more than normal.    Proton Pump Inhibitors Nausea And Vomiting and Anxiety     Other reaction(s): GI Distress, GI Upset  Heart flutters more than normal.

## 2020-12-29 NOTE — TELEPHONE ENCOUNTER
From: Candis Bailon  To: Jarad Blair DO  Sent: 12/27/2020 12:03 PM EST  Subject: Prescription Question    Good Morning,  I need a refill on my Xanax that I forgot to get at my last visit.   Thank you,  Marta Daugherty

## 2021-01-25 ENCOUNTER — PATIENT MESSAGE (OUTPATIENT)
Dept: FAMILY MEDICINE CLINIC | Age: 71
End: 2021-01-25

## 2021-01-25 DIAGNOSIS — R10.13 EPIGASTRIC PAIN: Primary | ICD-10-CM

## 2021-01-26 NOTE — TELEPHONE ENCOUNTER
From: Iwona Saavedra  To: Teresa Edenilson DO  Sent: 1/25/2021 5:16 PM EST  Subject: Non-Urgent Medical Question    Thank you very much, I appreciate it !      ----- Message -----   [address] Karin Costello, DO   Sent:1/25/2021 3:56 PM EST   To:Sheryl Ramos   Subject:RE: Non-Urgent Medical Question    I did put in lab orders to test liver, pancreas and blood count to make sure there is no anemia or infection. Dr. Maryan Ravi      ----- Message -----   [address] [facility]   Sent:1/25/2021 1:15 PM EST   To:Luz Maria Ravi,   I had an appt with an orthopedic NP today for my spine issues. I am having pain in between my rib cage usually after I eat. I started getting it after surgeon repaired T10 & T12. She doesn't think it has anything to do with that and suggested I contact you for blood tests to see if it could be spleen, pancreas, liver, whatever else is in that area. I am getting/have kyphosis and it just seems funny I started getting this pain after the kyphoplasty, but she said it wouldn't hurt to get it checked out.   Thank you,  Geo Guaman

## 2021-01-27 ENCOUNTER — HOSPITAL ENCOUNTER (OUTPATIENT)
Dept: LAB | Age: 71
Discharge: HOME | End: 2021-01-27
Payer: MEDICARE

## 2021-01-27 DIAGNOSIS — R10.13 EPIGASTRIC PAIN: ICD-10-CM

## 2021-01-27 LAB
ABSOLUTE EOS #: 0.22 K/UL (ref 0–0.44)
ABSOLUTE IMMATURE GRANULOCYTE: <0.03 K/UL (ref 0–0.3)
ABSOLUTE LYMPH #: 1.63 K/UL (ref 1.1–3.7)
ABSOLUTE MONO #: 0.71 K/UL (ref 0.1–1.2)
ALBUMIN SERPL-MCNC: 4.6 G/DL (ref 3.5–5.2)
ALBUMIN/GLOBULIN RATIO: 1.4 (ref 1–2.5)
ALP BLD-CCNC: 86 U/L (ref 35–104)
ALT SERPL-CCNC: 13 U/L (ref 5–33)
AMYLASE: 29 U/L (ref 28–100)
ANION GAP SERPL CALCULATED.3IONS-SCNC: 10 MMOL/L (ref 9–17)
AST SERPL-CCNC: 18 U/L
BASOPHILS # BLD: 1 % (ref 0–2)
BASOPHILS ABSOLUTE: 0.06 K/UL (ref 0–0.2)
BILIRUB SERPL-MCNC: 0.35 MG/DL (ref 0.3–1.2)
BUN BLDV-MCNC: 11 MG/DL (ref 8–23)
BUN/CREAT BLD: 19 (ref 9–20)
CALCIUM SERPL-MCNC: 9.8 MG/DL (ref 8.6–10.4)
CHLORIDE BLD-SCNC: 100 MMOL/L (ref 98–107)
CO2: 27 MMOL/L (ref 20–31)
CREAT SERPL-MCNC: 0.59 MG/DL (ref 0.5–0.9)
DIFFERENTIAL TYPE: ABNORMAL
EOSINOPHILS RELATIVE PERCENT: 3 % (ref 1–4)
GFR AFRICAN AMERICAN: >60 ML/MIN
GFR NON-AFRICAN AMERICAN: >60 ML/MIN
GFR SERPL CREATININE-BSD FRML MDRD: ABNORMAL ML/MIN/{1.73_M2}
GFR SERPL CREATININE-BSD FRML MDRD: ABNORMAL ML/MIN/{1.73_M2}
GLUCOSE BLD-MCNC: 115 MG/DL (ref 70–99)
HCT VFR BLD CALC: 40.2 % (ref 36.3–47.1)
HEMOGLOBIN: 12.7 G/DL (ref 11.9–15.1)
IMMATURE GRANULOCYTES: 0 %
LIPASE: 30 U/L (ref 13–60)
LYMPHOCYTES # BLD: 24 % (ref 24–43)
MCH RBC QN AUTO: 30.6 PG (ref 25.2–33.5)
MCHC RBC AUTO-ENTMCNC: 31.6 G/DL (ref 25.2–33.5)
MCV RBC AUTO: 96.9 FL (ref 82.6–102.9)
MONOCYTES # BLD: 10 % (ref 3–12)
NRBC AUTOMATED: 0 PER 100 WBC
PDW BLD-RTO: 11.7 % (ref 11.8–14.4)
PLATELET # BLD: 289 K/UL (ref 138–453)
PLATELET ESTIMATE: ABNORMAL
PMV BLD AUTO: 8.9 FL (ref 8.1–13.5)
POTASSIUM SERPL-SCNC: 4.2 MMOL/L (ref 3.7–5.3)
RBC # BLD: 4.15 M/UL (ref 3.95–5.11)
RBC # BLD: ABNORMAL 10*6/UL
SEG NEUTROPHILS: 62 % (ref 36–65)
SEGMENTED NEUTROPHILS ABSOLUTE COUNT: 4.23 K/UL (ref 1.5–8.1)
SODIUM BLD-SCNC: 137 MMOL/L (ref 135–144)
TOTAL PROTEIN: 7.8 G/DL (ref 6.4–8.3)
WBC # BLD: 6.9 K/UL (ref 3.5–11.3)
WBC # BLD: ABNORMAL 10*3/UL

## 2021-01-27 PROCEDURE — 83690 ASSAY OF LIPASE: CPT

## 2021-01-27 PROCEDURE — 80053 COMPREHEN METABOLIC PANEL: CPT

## 2021-01-27 PROCEDURE — 82150 ASSAY OF AMYLASE: CPT

## 2021-01-27 PROCEDURE — 36415 COLL VENOUS BLD VENIPUNCTURE: CPT

## 2021-01-27 PROCEDURE — 85025 COMPLETE CBC W/AUTO DIFF WBC: CPT

## 2021-02-22 DIAGNOSIS — F41.9 ANXIETY: ICD-10-CM

## 2021-02-23 RX ORDER — ALPRAZOLAM 0.25 MG/1
TABLET ORAL
Qty: 90 TABLET | Refills: 0 | Status: SHIPPED | OUTPATIENT
Start: 2021-02-23 | End: 2021-03-21 | Stop reason: SDUPTHER

## 2021-02-23 NOTE — TELEPHONE ENCOUNTER
Navarro Keenan called requesting a refill of the below medication which has been pended for you: per FLAVIA, last xanax fill 12/29/2020 #90    Requested Prescriptions     Pending Prescriptions Disp Refills    ALPRAZolam (XANAX) 0.25 MG tablet [Pharmacy Med Name: ALPRAZolam Oral Tablet 0.25 MG] 90 tablet 0     Sig: TAKE 1 TABLET BY MOUTH THREE TIMES A DAY AS NEEDED FOR ANXIETY FOR UP TO 30 DAYS       Last Appointment Date: 12/16/2020  Next Appointment Date: 6/16/2021    Allergies   Allergen Reactions    Amiodarone Shortness Of Breath     Authoring Clinician or Source System: Lux Embs  Respiratory difficulty    Amoxicillin Anaphylaxis    Beta Adrenergic Blockers Shortness Of Breath    Metoprolol Shortness Of Breath     \"Beta Blockers    Penicillin V Potassium Anaphylaxis    Lisinopril Other (See Comments)     Eyes were beating along with her heart beat/HTN    Dronedarone Other (See Comments)     Irregular Heart Beats  Authoring Clinician or Source System: Kyara Schmitt  ([place])  Edema and irregular heartbeats    Epinephrine Other (See Comments)     dental admin gave her afib      Statins     Morphine Nausea Only, Nausea And Vomiting and Other (See Comments)     Nausea  Other reaction(s): GI Upset  Nausea    Nexium [Esomeprazole Magnesium] Anxiety     Heart flutters more than normal.    Proton Pump Inhibitors Nausea And Vomiting and Anxiety     Other reaction(s): GI Distress, GI Upset  Heart flutters more than normal.

## 2021-02-26 ENCOUNTER — PATIENT MESSAGE (OUTPATIENT)
Dept: FAMILY MEDICINE CLINIC | Age: 71
End: 2021-02-26

## 2021-02-26 RX ORDER — CLINDAMYCIN HYDROCHLORIDE 300 MG/1
CAPSULE ORAL
Qty: 8 CAPSULE | Refills: 0 | Status: SHIPPED | OUTPATIENT
Start: 2021-02-26 | End: 2021-06-29

## 2021-02-26 RX ORDER — VALACYCLOVIR HYDROCHLORIDE 500 MG/1
TABLET, FILM COATED ORAL
Qty: 6 TABLET | Refills: 2 | Status: SHIPPED | OUTPATIENT
Start: 2021-02-26 | End: 2021-05-22 | Stop reason: SDUPTHER

## 2021-02-26 NOTE — TELEPHONE ENCOUNTER
Shey Villa called requesting a refill of the below medication which has been pended for you:     Requested Prescriptions     Pending Prescriptions Disp Refills    valACYclovir (VALTREX) 500 MG tablet 6 tablet 2     Si tab bid       Last Appointment Date: 2020  Next Appointment Date: 2021    Allergies   Allergen Reactions    Amiodarone Shortness Of Breath     Authoring Clinician or Source System: Uli Herrera  Respiratory difficulty    Amoxicillin Anaphylaxis    Beta Adrenergic Blockers Shortness Of Breath    Metoprolol Shortness Of Breath     \"Beta Blockers    Penicillin V Potassium Anaphylaxis    Lisinopril Other (See Comments)     Eyes were beating along with her heart beat/HTN    Dronedarone Other (See Comments)     Irregular Heart Beats  Authoring Clinician or Source System: Kyara Schmitt  ([place])  Edema and irregular heartbeats    Epinephrine Other (See Comments)     dental admin gave her afib      Statins     Morphine Nausea Only, Nausea And Vomiting and Other (See Comments)     Nausea  Other reaction(s): GI Upset  Nausea    Nexium [Esomeprazole Magnesium] Anxiety     Heart flutters more than normal.    Proton Pump Inhibitors Nausea And Vomiting and Anxiety     Other reaction(s): GI Distress, GI Upset  Heart flutters more than normal.

## 2021-02-26 NOTE — TELEPHONE ENCOUNTER
Shey Villa called requesting a refill of the below medication which has been pended for you:     Requested Prescriptions      No prescriptions requested or ordered in this encounter       Last Appointment Date: 12/16/2020  Next Appointment Date: 6/16/2021    Allergies   Allergen Reactions    Amiodarone Shortness Of Breath     Authoring Clinician or Source System: Uli Herrera  Respiratory difficulty    Amoxicillin Anaphylaxis    Beta Adrenergic Blockers Shortness Of Breath    Metoprolol Shortness Of Breath     \"Beta Blockers    Penicillin V Potassium Anaphylaxis    Lisinopril Other (See Comments)     Eyes were beating along with her heart beat/HTN    Dronedarone Other (See Comments)     Irregular Heart Beats  Authoring Clinician or Source System: Kyara Schmitt  ([place])  Edema and irregular heartbeats    Epinephrine Other (See Comments)     dental admin gave her afib      Statins     Morphine Nausea Only, Nausea And Vomiting and Other (See Comments)     Nausea  Other reaction(s): GI Upset  Nausea    Nexium [Esomeprazole Magnesium] Anxiety     Heart flutters more than normal.    Proton Pump Inhibitors Nausea And Vomiting and Anxiety     Other reaction(s): GI Distress, GI Upset  Heart flutters more than normal.

## 2021-02-26 NOTE — TELEPHONE ENCOUNTER
From: Ashley Day  To: Glen Kearns DO  Sent: 2/26/2021 11:03 AM EST  Subject: Prescription Question    Good Morning,   I would like a refill for Clindamycin for pre-dental, 300mg, take 2 before procedure. Total 8 pills.    Thank you,  La Mahajan

## 2021-03-17 RX ORDER — FAMOTIDINE 20 MG/1
20 TABLET, FILM COATED ORAL 2 TIMES DAILY
Qty: 180 TABLET | Refills: 1 | Status: SHIPPED | OUTPATIENT
Start: 2021-03-17

## 2021-03-17 NOTE — TELEPHONE ENCOUNTER
Buster Anand called requesting a refill of the below medication which has been pended for you:     Requested Prescriptions     Pending Prescriptions Disp Refills    famotidine (PEPCID) 20 MG tablet 180 tablet 1     Sig: Take 1 tablet by mouth 2 times daily       Last Appointment Date: 12/16/2020  Next Appointment Date: 6/16/2021    Allergies   Allergen Reactions    Amiodarone Shortness Of Breath     Authoring Clinician or Source System: [facility]  Respiratory difficulty    Amoxicillin Anaphylaxis    Beta Adrenergic Blockers Shortness Of Breath    Metoprolol Shortness Of Breath     \"Beta Blockers    Penicillin V Potassium Anaphylaxis    Lisinopril Other (See Comments)     Eyes were beating along with her heart beat/HTN    Dronedarone Other (See Comments)     Irregular Heart Beats  Authoring Clinician or Source System: Kyara Schmitt  ([place])  Edema and irregular heartbeats    Epinephrine Other (See Comments)     dental admin gave her afib      Statins     Morphine Nausea Only, Nausea And Vomiting and Other (See Comments)     Nausea  Other reaction(s): GI Upset  Nausea    Nexium [Esomeprazole Magnesium] Anxiety     Heart flutters more than normal.    Proton Pump Inhibitors Nausea And Vomiting and Anxiety     Other reaction(s): GI Distress, GI Upset  Heart flutters more than normal.

## 2021-03-21 DIAGNOSIS — F41.9 ANXIETY: ICD-10-CM

## 2021-03-22 RX ORDER — DILTIAZEM HYDROCHLORIDE 60 MG/1
TABLET, FILM COATED ORAL
Qty: 270 TABLET | Refills: 1 | Status: SHIPPED | OUTPATIENT
Start: 2021-03-22

## 2021-03-22 RX ORDER — ALPRAZOLAM 0.25 MG/1
0.25 TABLET ORAL 3 TIMES DAILY PRN
Qty: 90 TABLET | Refills: 0 | Status: SHIPPED | OUTPATIENT
Start: 2021-03-24 | End: 2021-04-22 | Stop reason: SDUPTHER

## 2021-03-22 NOTE — TELEPHONE ENCOUNTER
Viridiana Romero called requesting a refill of the below medication which has been pended for you: last refilled Xanax 2/23/2021 #90    Requested Prescriptions     Pending Prescriptions Disp Refills    dilTIAZem (CARDIZEM) 60 MG tablet 270 tablet 0     Sig: TAKE 1 TABLET BY MOUTH UP TO THREE TIMES DAILY AS NEEDED FOR A-FIB    ALPRAZolam (XANAX) 0.25 MG tablet 90 tablet 0       Last Appointment Date: 12/16/2020  Next Appointment Date: 6/16/2021    Allergies   Allergen Reactions    Amiodarone Shortness Of Breath     Authoring Clinician or Source System: Darius Sings  Respiratory difficulty    Amoxicillin Anaphylaxis    Beta Adrenergic Blockers Shortness Of Breath    Metoprolol Shortness Of Breath     \"Beta Blockers    Penicillin V Potassium Anaphylaxis    Lisinopril Other (See Comments)     Eyes were beating along with her heart beat/HTN    Dronedarone Other (See Comments)     Irregular Heart Beats  Authoring Clinician or Source System: Kyara Schmitt  ([place])  Edema and irregular heartbeats    Epinephrine Other (See Comments)     dental admin gave her afib      Statins     Morphine Nausea Only, Nausea And Vomiting and Other (See Comments)     Nausea  Other reaction(s): GI Upset  Nausea    Nexium [Esomeprazole Magnesium] Anxiety     Heart flutters more than normal.    Proton Pump Inhibitors Nausea And Vomiting and Anxiety     Other reaction(s): GI Distress, GI Upset  Heart flutters more than normal.

## 2021-04-22 DIAGNOSIS — F41.9 ANXIETY: ICD-10-CM

## 2021-04-23 RX ORDER — ALPRAZOLAM 0.25 MG/1
0.25 TABLET ORAL 3 TIMES DAILY PRN
Qty: 90 TABLET | Refills: 0 | Status: SHIPPED | OUTPATIENT
Start: 2021-04-23 | End: 2021-05-22 | Stop reason: SDUPTHER

## 2021-04-23 NOTE — TELEPHONE ENCOUNTER
Brando Fuentes called requesting a refill of the below medication which has been pended for you: per FLAVIA, last xanax fill 3/22/2021 #90    Requested Prescriptions     Pending Prescriptions Disp Refills    ALPRAZolam (XANAX) 0.25 MG tablet 90 tablet 0     Sig: Take 1 tablet by mouth 3 times daily as needed for Sleep or Anxiety for up to 30 days.        Last Appointment Date: 12/16/2020  Next Appointment Date: 6/16/2021    Allergies   Allergen Reactions    Amiodarone Shortness Of Breath     Authoring Clinician or Source System: [place]  Respiratory difficulty    Amoxicillin Anaphylaxis    Beta Adrenergic Blockers Shortness Of Breath    Metoprolol Shortness Of Breath     \"Beta Blockers    Penicillin V Potassium Anaphylaxis    Lisinopril Other (See Comments)     Eyes were beating along with her heart beat/HTN    Dronedarone Other (See Comments)     Irregular Heart Beats  Authoring Clinician or Source System: Kyara Schmitt  ([place])  Edema and irregular heartbeats    Epinephrine Other (See Comments)     dental admin gave her afib      Statins     Morphine Nausea Only, Nausea And Vomiting and Other (See Comments)     Nausea  Other reaction(s): GI Upset  Nausea    Nexium [Esomeprazole Magnesium] Anxiety     Heart flutters more than normal.    Proton Pump Inhibitors Nausea And Vomiting and Anxiety     Other reaction(s): GI Distress, GI Upset  Heart flutters more than normal.

## 2021-05-22 DIAGNOSIS — F41.9 ANXIETY: ICD-10-CM

## 2021-05-24 RX ORDER — VALACYCLOVIR HYDROCHLORIDE 500 MG/1
TABLET, FILM COATED ORAL
Qty: 6 TABLET | Refills: 2 | Status: SHIPPED | OUTPATIENT
Start: 2021-05-24

## 2021-05-24 RX ORDER — ALPRAZOLAM 0.25 MG/1
0.25 TABLET ORAL 3 TIMES DAILY PRN
Qty: 90 TABLET | Refills: 0 | Status: SHIPPED | OUTPATIENT
Start: 2021-05-24 | End: 2021-07-26

## 2021-05-24 NOTE — TELEPHONE ENCOUNTER
Deward Moon called requesting a refill of the below medication which has been pended for you: last filled Xanax 2021 #90    Requested Prescriptions     Pending Prescriptions Disp Refills    valACYclovir (VALTREX) 500 MG tablet 6 tablet 2     Si tab bid    ALPRAZolam (XANAX) 0.25 MG tablet 90 tablet 0     Sig: Take 1 tablet by mouth 3 times daily as needed for Sleep or Anxiety for up to 30 days.        Last Appointment Date: 2020  Next Appointment Date: 2021    Allergies   Allergen Reactions    Amiodarone Shortness Of Breath     Authoring Clinician or Source System: Constance Wolfe  Respiratory difficulty    Amoxicillin Anaphylaxis    Beta Adrenergic Blockers Shortness Of Breath    Metoprolol Shortness Of Breath     \"Beta Blockers    Penicillin V Potassium Anaphylaxis    Lisinopril Other (See Comments)     Eyes were beating along with her heart beat/HTN    Dronedarone Other (See Comments)     Irregular Heart Beats  Authoring Clinician or Source System: Kyara Schmitt  ([place])  Edema and irregular heartbeats    Epinephrine Other (See Comments)     dental admin gave her afib      Statins     Morphine Nausea Only, Nausea And Vomiting and Other (See Comments)     Nausea  Other reaction(s): GI Upset  Nausea    Nexium [Esomeprazole Magnesium] Anxiety     Heart flutters more than normal.    Proton Pump Inhibitors Nausea And Vomiting and Anxiety     Other reaction(s): GI Distress, GI Upset  Heart flutters more than normal.

## 2021-06-10 ENCOUNTER — HOSPITAL ENCOUNTER (OUTPATIENT)
Dept: LAB | Age: 71
Discharge: HOME | End: 2021-06-10
Payer: MEDICARE

## 2021-06-10 DIAGNOSIS — E83.42 HYPOMAGNESEMIA: ICD-10-CM

## 2021-06-10 DIAGNOSIS — E78.2 MIXED HYPERLIPIDEMIA: ICD-10-CM

## 2021-06-10 DIAGNOSIS — I10 ESSENTIAL HYPERTENSION: ICD-10-CM

## 2021-06-10 LAB
ABSOLUTE EOS #: 0.07 K/UL (ref 0–0.44)
ABSOLUTE IMMATURE GRANULOCYTE: 0.03 K/UL (ref 0–0.3)
ABSOLUTE LYMPH #: 2.05 K/UL (ref 1.1–3.7)
ABSOLUTE MONO #: 0.73 K/UL (ref 0.1–1.2)
ALBUMIN SERPL-MCNC: 4.7 G/DL (ref 3.5–5.2)
ALBUMIN/GLOBULIN RATIO: 1.5 (ref 1–2.5)
ALP BLD-CCNC: 85 U/L (ref 35–104)
ALT SERPL-CCNC: 15 U/L (ref 5–33)
ANION GAP SERPL CALCULATED.3IONS-SCNC: 7 MMOL/L (ref 9–17)
AST SERPL-CCNC: 24 U/L
BASOPHILS # BLD: 1 % (ref 0–2)
BASOPHILS ABSOLUTE: 0.04 K/UL (ref 0–0.2)
BILIRUB SERPL-MCNC: 0.51 MG/DL (ref 0.3–1.2)
BUN BLDV-MCNC: 12 MG/DL (ref 8–23)
BUN/CREAT BLD: 17 (ref 9–20)
CALCIUM SERPL-MCNC: 9.8 MG/DL (ref 8.6–10.4)
CHLORIDE BLD-SCNC: 99 MMOL/L (ref 98–107)
CHOLESTEROL/HDL RATIO: 3.3
CHOLESTEROL: 186 MG/DL
CO2: 29 MMOL/L (ref 20–31)
CREAT SERPL-MCNC: 0.7 MG/DL (ref 0.5–0.9)
DIFFERENTIAL TYPE: NORMAL
EOSINOPHILS RELATIVE PERCENT: 1 % (ref 1–4)
GFR AFRICAN AMERICAN: >60 ML/MIN
GFR NON-AFRICAN AMERICAN: >60 ML/MIN
GFR SERPL CREATININE-BSD FRML MDRD: ABNORMAL ML/MIN/{1.73_M2}
GFR SERPL CREATININE-BSD FRML MDRD: ABNORMAL ML/MIN/{1.73_M2}
GLUCOSE BLD-MCNC: 99 MG/DL (ref 70–99)
HCT VFR BLD CALC: 42.1 % (ref 36.3–47.1)
HDLC SERPL-MCNC: 56 MG/DL
HEMOGLOBIN: 13.8 G/DL (ref 11.9–15.1)
IMMATURE GRANULOCYTES: 0 %
LDL CHOLESTEROL: 114 MG/DL (ref 0–130)
LYMPHOCYTES # BLD: 27 % (ref 24–43)
MAGNESIUM: 2.1 MG/DL (ref 1.6–2.6)
MCH RBC QN AUTO: 31.1 PG (ref 25.2–33.5)
MCHC RBC AUTO-ENTMCNC: 32.8 G/DL (ref 25.2–33.5)
MCV RBC AUTO: 94.8 FL (ref 82.6–102.9)
MONOCYTES # BLD: 10 % (ref 3–12)
NRBC AUTOMATED: 0 PER 100 WBC
PDW BLD-RTO: 12.2 % (ref 11.8–14.4)
PLATELET # BLD: 277 K/UL (ref 138–453)
PLATELET ESTIMATE: NORMAL
PMV BLD AUTO: 9.1 FL (ref 8.1–13.5)
POTASSIUM SERPL-SCNC: 4.7 MMOL/L (ref 3.7–5.3)
RBC # BLD: 4.44 M/UL (ref 3.95–5.11)
RBC # BLD: NORMAL 10*6/UL
SEG NEUTROPHILS: 61 % (ref 36–65)
SEGMENTED NEUTROPHILS ABSOLUTE COUNT: 4.55 K/UL (ref 1.5–8.1)
SODIUM BLD-SCNC: 135 MMOL/L (ref 135–144)
TOTAL PROTEIN: 7.9 G/DL (ref 6.4–8.3)
TRIGL SERPL-MCNC: 81 MG/DL
VLDLC SERPL CALC-MCNC: NORMAL MG/DL (ref 1–30)
WBC # BLD: 7.5 K/UL (ref 3.5–11.3)
WBC # BLD: NORMAL 10*3/UL

## 2021-06-10 PROCEDURE — 80053 COMPREHEN METABOLIC PANEL: CPT

## 2021-06-10 PROCEDURE — 36415 COLL VENOUS BLD VENIPUNCTURE: CPT

## 2021-06-10 PROCEDURE — 83735 ASSAY OF MAGNESIUM: CPT

## 2021-06-10 PROCEDURE — 85025 COMPLETE CBC W/AUTO DIFF WBC: CPT

## 2021-06-10 PROCEDURE — 80061 LIPID PANEL: CPT

## 2021-06-14 ENCOUNTER — PATIENT MESSAGE (OUTPATIENT)
Dept: FAMILY MEDICINE CLINIC | Age: 71
End: 2021-06-14

## 2021-06-15 NOTE — TELEPHONE ENCOUNTER
From: Darien Rosenthaler  To: Kelsea Guerrier DO  Sent: 6/14/2021 8:49 PM EDT  Subject: Test Results Question    Dr. Juan Roberson,  I have an appointment with you Wednesday morning. I need your help with my back issues. Are you able to view the full resolution images on x-rays and MRI's I've had done outside of [facility]? I had x-rays on March 1st from Dr. Ricky Briceno at [address], and a MRI on May 12th at [facility] in [address] [place] [address]. [facility]. If you cannot see the images, will you please let me know asap ([phone]) and I will bring the discs in Tuesday to be copied to help speed up my appointment with you. Thank you !   Vicky El

## 2021-06-16 ENCOUNTER — OFFICE VISIT (OUTPATIENT)
Dept: FAMILY MEDICINE CLINIC | Age: 71
End: 2021-06-16
Payer: MEDICARE

## 2021-06-16 VITALS
DIASTOLIC BLOOD PRESSURE: 80 MMHG | RESPIRATION RATE: 18 BRPM | WEIGHT: 117 LBS | BODY MASS INDEX: 21.53 KG/M2 | HEIGHT: 62 IN | HEART RATE: 66 BPM | TEMPERATURE: 97.2 F | OXYGEN SATURATION: 96 % | SYSTOLIC BLOOD PRESSURE: 138 MMHG

## 2021-06-16 DIAGNOSIS — R73.01 IMPAIRED FASTING GLUCOSE: ICD-10-CM

## 2021-06-16 DIAGNOSIS — M54.6 CHRONIC MIDLINE THORACIC BACK PAIN: ICD-10-CM

## 2021-06-16 DIAGNOSIS — J84.10 LUNG GRANULOMA (HCC): ICD-10-CM

## 2021-06-16 DIAGNOSIS — E83.42 HYPOMAGNESEMIA: ICD-10-CM

## 2021-06-16 DIAGNOSIS — I10 ESSENTIAL HYPERTENSION: Primary | ICD-10-CM

## 2021-06-16 DIAGNOSIS — I48.0 PAROXYSMAL ATRIAL FIBRILLATION (HCC): ICD-10-CM

## 2021-06-16 DIAGNOSIS — M80.08XA AGE-RELATED OSTEOPOROSIS WITH CURRENT PATHOLOGICAL FRACTURE, VERTEBRA(E), INITIAL ENCOUNTER FOR FRACTURE (HCC): ICD-10-CM

## 2021-06-16 DIAGNOSIS — G89.29 CHRONIC MIDLINE THORACIC BACK PAIN: ICD-10-CM

## 2021-06-16 DIAGNOSIS — F41.9 ANXIETY: ICD-10-CM

## 2021-06-16 DIAGNOSIS — G89.29 CHRONIC MIDLINE LOW BACK PAIN, UNSPECIFIED WHETHER SCIATICA PRESENT: ICD-10-CM

## 2021-06-16 DIAGNOSIS — E78.2 MIXED HYPERLIPIDEMIA: ICD-10-CM

## 2021-06-16 DIAGNOSIS — M54.50 CHRONIC MIDLINE LOW BACK PAIN, UNSPECIFIED WHETHER SCIATICA PRESENT: ICD-10-CM

## 2021-06-16 PROCEDURE — 4040F PNEUMOC VAC/ADMIN/RCVD: CPT | Performed by: FAMILY MEDICINE

## 2021-06-16 PROCEDURE — 3017F COLORECTAL CA SCREEN DOC REV: CPT | Performed by: FAMILY MEDICINE

## 2021-06-16 PROCEDURE — G8399 PT W/DXA RESULTS DOCUMENT: HCPCS | Performed by: FAMILY MEDICINE

## 2021-06-16 PROCEDURE — 1123F ACP DISCUSS/DSCN MKR DOCD: CPT | Performed by: FAMILY MEDICINE

## 2021-06-16 PROCEDURE — 99212 OFFICE O/P EST SF 10 MIN: CPT | Performed by: FAMILY MEDICINE

## 2021-06-16 PROCEDURE — 1090F PRES/ABSN URINE INCON ASSESS: CPT | Performed by: FAMILY MEDICINE

## 2021-06-16 PROCEDURE — 1036F TOBACCO NON-USER: CPT | Performed by: FAMILY MEDICINE

## 2021-06-16 PROCEDURE — G8427 DOCREV CUR MEDS BY ELIG CLIN: HCPCS | Performed by: FAMILY MEDICINE

## 2021-06-16 PROCEDURE — 99214 OFFICE O/P EST MOD 30 MIN: CPT | Performed by: FAMILY MEDICINE

## 2021-06-16 PROCEDURE — G8420 CALC BMI NORM PARAMETERS: HCPCS | Performed by: FAMILY MEDICINE

## 2021-06-16 RX ORDER — DILTIAZEM HYDROCHLORIDE 240 MG/1
240 CAPSULE, EXTENDED RELEASE ORAL DAILY
COMMUNITY
Start: 2021-02-17

## 2021-06-16 RX ORDER — ASPIRIN 81 MG/1
81 TABLET, CHEWABLE ORAL DAILY
COMMUNITY
Start: 2021-03-31

## 2021-06-16 SDOH — ECONOMIC STABILITY: TRANSPORTATION INSECURITY
IN THE PAST 12 MONTHS, HAS LACK OF TRANSPORTATION KEPT YOU FROM MEETINGS, WORK, OR FROM GETTING THINGS NEEDED FOR DAILY LIVING?: NO

## 2021-06-16 SDOH — ECONOMIC STABILITY: FOOD INSECURITY: WITHIN THE PAST 12 MONTHS, YOU WORRIED THAT YOUR FOOD WOULD RUN OUT BEFORE YOU GOT MONEY TO BUY MORE.: NEVER TRUE

## 2021-06-16 SDOH — ECONOMIC STABILITY: FOOD INSECURITY: WITHIN THE PAST 12 MONTHS, THE FOOD YOU BOUGHT JUST DIDN'T LAST AND YOU DIDN'T HAVE MONEY TO GET MORE.: NEVER TRUE

## 2021-06-16 SDOH — ECONOMIC STABILITY: TRANSPORTATION INSECURITY
IN THE PAST 12 MONTHS, HAS THE LACK OF TRANSPORTATION KEPT YOU FROM MEDICAL APPOINTMENTS OR FROM GETTING MEDICATIONS?: NO

## 2021-06-16 ASSESSMENT — ENCOUNTER SYMPTOMS
EYE DISCHARGE: 0
VOMITING: 0
DIARRHEA: 0
RHINORRHEA: 0
SINUS PRESSURE: 0
WHEEZING: 0
SHORTNESS OF BREATH: 0
BACK PAIN: 1
EYE REDNESS: 0
TROUBLE SWALLOWING: 0
ABDOMINAL PAIN: 0
SORE THROAT: 0
CONSTIPATION: 0
COUGH: 0
NAUSEA: 0

## 2021-06-16 ASSESSMENT — PATIENT HEALTH QUESTIONNAIRE - PHQ9
SUM OF ALL RESPONSES TO PHQ9 QUESTIONS 1 & 2: 0
2. FEELING DOWN, DEPRESSED OR HOPELESS: 0
SUM OF ALL RESPONSES TO PHQ QUESTIONS 1-9: 0
SUM OF ALL RESPONSES TO PHQ QUESTIONS 1-9: 0
1. LITTLE INTEREST OR PLEASURE IN DOING THINGS: 0
SUM OF ALL RESPONSES TO PHQ QUESTIONS 1-9: 0

## 2021-06-16 ASSESSMENT — SOCIAL DETERMINANTS OF HEALTH (SDOH): HOW HARD IS IT FOR YOU TO PAY FOR THE VERY BASICS LIKE FOOD, HOUSING, MEDICAL CARE, AND HEATING?: NOT HARD AT ALL

## 2021-06-16 NOTE — PROGRESS NOTES
when we started her on Forteo she started noticing symptoms of her A. fib again. Had been in normal sinus rhythm after her cardiac procedures but she did go see the cardiologist in follow-up. EKG was performed and did show A. fib. She did stop the Forteo as she did research this and it was found that people over 60 have a higher incidence of recurrent A. fib with Forteo. With regards to her osteoporosis at this point she is just maintaining weightbearing activity and we will just need to monitor. 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 Patient does note to me that she has had some postnasal drainage that has been thick and foul-smelling over the last couple of weeks duration. She has been doing sinus rinses which has not really seem to help significantly. Has not had any fever or chills. No other respiratory symptoms. Suspect probable allergic trigger. She is not able to tolerate antihistamines but would suggest perhaps trying Mucinex to see if that will help thin some of the thick drainage. Patient otherwise has no other acute medical concerns. .  Patient's recent lab reports are as follows:    Results for orders placed or performed during the hospital encounter of 06/10/21   Lipid Panel   Result Value Ref Range    Cholesterol 186 <200 mg/dL    HDL 56 >40 mg/dL    LDL Cholesterol 114 0 - 130 mg/dL    Chol/HDL Ratio 3.3 <5    Triglycerides 81 <150 mg/dL    VLDL NOT REPORTED 1 - 30 mg/dL   Magnesium   Result Value Ref Range    Magnesium 2.1 1.6 - 2.6 mg/dL   Comprehensive Metabolic Panel   Result Value Ref Range    Glucose 99 70 - 99 mg/dL    BUN 12 8 - 23 mg/dL    CREATININE 0.70 0.50 - 0.90 mg/dL    Bun/Cre Ratio 17 9 - 20    Calcium 9.8 8.6 - 10.4 mg/dL    Sodium 135 135 - 144 mmol/L    Potassium 4.7 3.7 - 5.3 mmol/L    Chloride 99 98 - 107 mmol/L    CO2 29 20 - 31 mmol/L    Anion Gap 7 (L) 9 - 17 mmol/L    Alkaline Phosphatase 85 35 - 104 U/L    ALT 15 5 - 33 U/L    AST 24 <32 U/L    Total Bilirubin 0.51 0.3 - 1.2 mg/dL    Total Protein 7.9 6.4 - 8.3 g/dL    Albumin 4.7 3.5 - 5.2 g/dL    Albumin/Globulin Ratio 1.5 1.0 - 2.5    GFR Non-African American >60 >60 mL/min    GFR African American >60 >60 mL/min    GFR Comment          GFR Staging NOT REPORTED    CBC Auto Differential   Result Value Ref Range    WBC 7.5 3.5 - 11.3 k/uL    RBC 4.44 3.95 - 5.11 m/uL    Hemoglobin 13.8 11.9 - 15.1 g/dL    Hematocrit 42.1 36.3 - 47.1 %    MCV 94.8 82.6 - 102.9 fL    MCH 31.1 25.2 - 33.5 pg    MCHC 32.8 25.2 - 33.5 g/dL    RDW 12.2 11.8 - 14.4 %    Platelets 935 618 - 755 k/uL    MPV 9.1 8.1 - 13.5 fL    NRBC Automated 0.0 0.0 per 100 WBC    Differential Type NOT REPORTED     Seg Neutrophils 61 36 - 65 %    Lymphocytes 27 24 - 43 %    Monocytes 10 3 - 12 %    Eosinophils % 1 1 - 4 %    Basophils 1 0 - 2 %    Immature Granulocytes 0 0 %    Segs Absolute 4.55 1.50 - 8.10 k/uL    Absolute Lymph # 2.05 1.10 - 3.70 k/uL    Absolute Mono # 0.73 0.10 - 1.20 k/uL    Absolute Eos # 0.07 0.00 - 0.44 k/uL    Basophils Absolute 0.04 0.00 - 0.20 k/uL    Absolute Immature Granulocyte 0.03 0.00 - 0.30 k/uL    WBC Morphology NOT REPORTED     RBC Morphology NOT REPORTED     Platelet Estimate NOT REPORTED       Other review of systems are as noted below. Preventative measures are reviewed today. See health maintenance section for complete preventative plan of care. Did review patient's med list, allergies, social history, fam history, pmhx and pshx today as noted in the record. Review of Systems   Constitutional: Negative for chills, fatigue and fever. HENT: Positive for congestion and postnasal drip. Negative for ear pain, rhinorrhea, sinus pressure, sore throat and trouble swallowing. Eyes: Negative for discharge and redness. Respiratory: Negative for cough, shortness of breath and wheezing. Cardiovascular: Negative for chest pain. Gastrointestinal: Negative for abdominal pain, constipation, diarrhea, nausea and vomiting. Genitourinary: Negative for dysuria, flank pain, frequency and urgency. Musculoskeletal: Positive for back pain. Negative for arthralgias, myalgias and neck pain. Skin: Negative for rash and wound. Allergic/Immunologic: Negative for environmental allergies. Neurological: Negative for dizziness, weakness, light-headedness and headaches. Hematological: Negative for adenopathy. Psychiatric/Behavioral: Negative. Prior to Visit Medications    Medication Sig Taking? Authorizing Provider   valACYclovir (VALTREX) 500 MG tablet 1 tab bid  Farhana Dallas DO   ALPRAZolam Mickeal [employer]) 0.25 MG tablet Take 1 tablet by mouth 3 times daily as needed for Sleep or Anxiety for up to 30 days.   Farhana Dallas DO   dilTIAZem (CARDIZEM) 60 MG tablet TAKE 1 TABLET BY MOUTH UP TO THREE TIMES DAILY AS NEEDED FOR A-FIB  Farhana Dallas DO   famotidine (PEPCID) 20 MG tablet Take 1 tablet by mouth 2 times daily  Luz Maria Purvis DO   clindamycin (CLEOCIN) 300 MG capsule Take 600 mg 1 hr prior to dental procedures  Farhana Dallas DO   tiZANidine (ZANAFLEX) 4 MG tablet Take 1 tablet by mouth 3 times daily as needed (back pain/spasm)  Farhana Dallas DO   Teriparatide, Recombinant, (FORTEO) 600 MCG/2.4ML SOPN injection Inject 0.08 mLs into the skin daily  Luz Maria Purvis DO   Aspirin Buf,GpGzk-QtHaz-PhLkq, (BUFFERED ASPIRIN) 325 MG TABS Take by mouth daily as needed for Pain  Historical Provider, MD   furosemide (LASIX) 20 MG tablet take 1 tablet by mouth as needed for weight gain of 3-4 pounds overnight  Historical Provider, MD   dilTIAZem (CARDIZEM CD) 180 MG extended release capsule Take 1 capsule by mouth daily  Farhana Dallas DO   potassium chloride (MICRO-K) 10 MEQ extended release capsule Take 1 capsule by mouth 2 times daily  Patient taking differently: Take 10 mEq by mouth daily as needed If she takes lasix babsn  LuzM aria Purvis DO   melatonin 3 MG TABS tablet Take 3 mg by mouth  Historical Provider, MD   Plant Sterols and Stanols (CHOLESTOFF PO) Take 1,800 mg by mouth daily  Historical Provider, MD        Social History     Tobacco Use    Smoking status: Former Smoker     Packs/day: 0.25     Years: 20.00     Pack years: 5.00     Types: Cigarettes     Quit date: 5/15/2009     Years since quittin.0    Smokeless tobacco: Never Used    Tobacco comment: social   Substance Use Topics    Alcohol use: No        There were no vitals filed for this visit. Estimated body mass index is 21.77 kg/m² as calculated from the following:    Height as of 20: 5' 2\" (1.575 m). Weight as of 20: 119 lb (54 kg). Physical Exam  Vitals and nursing note reviewed. Constitutional:       General: She is not in acute distress. Appearance: Normal appearance. She is well-developed. She is not diaphoretic. HENT:      Head: Normocephalic and atraumatic. Right Ear: Tympanic membrane, ear canal and external ear normal.      Left Ear: Tympanic membrane, ear canal and external ear normal.      Nose: Nose normal.      Mouth/Throat:      Mouth: Mucous membranes are moist.      Pharynx: Oropharyngeal exudate present. Comments: Post nasal drainage noted. Eyes:      General:         Right eye: No discharge. Left eye: No discharge. Conjunctiva/sclera: Conjunctivae normal.      Pupils: Pupils are equal, round, and reactive to light. Neck:      Thyroid: No thyromegaly. Cardiovascular:      Rate and Rhythm: Normal rate and regular rhythm. Heart sounds: Murmur heard. Pulmonary:      Effort: Pulmonary effort is normal.      Breath sounds: Normal breath sounds. No wheezing or rales. Abdominal:      General: Bowel sounds are normal. There is no distension. Palpations: Abdomen is soft. Tenderness: There is no abdominal tenderness.    Musculoskeletal:      Cervical back: Normal range of motion and neck supple. Lymphadenopathy:      Cervical: No cervical adenopathy. Skin:     General: Skin is warm and dry. Findings: No rash. Neurological:      Mental Status: She is alert and oriented to person, place, and time. Psychiatric:         Behavior: Behavior normal.         Thought Content: Thought content normal.         Judgment: Judgment normal.           ASSESSMENT/PLAN:  Encounter Diagnoses   Name Primary? 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 Orders Placed This Encounter   Procedures    CBC Auto Differential     Standing Status:   Future     Standing Expiration Date:   6/16/2022    Comprehensive Metabolic Panel     Standing Status:   Future     Standing Expiration Date:   6/16/2022    Hemoglobin A1C     Standing Status:   Future     Standing Expiration Date:   6/16/2022    Lipid Panel     Standing Status:   Future     Standing Expiration Date:   6/16/2022     Order Specific Question:   Is Patient Fasting?/# of Hours     Answer:   12 hours    Magnesium     Standing Status:   Future     Standing Expiration Date:   6/16/2022    Vitamin D 25 Hydroxy     Standing Status:   Future     Standing Expiration Date:   6/16/2022     Patient is to follow-up with pain management for continued management of her chronic back pain issues. May want to consider T8 nerve block for the ongoing pain to the lower sternal region. Patient is to try Mucinex to help with the postnasal drainage.   Suspect this is probably more of an allergic trigger. Patient is to continue on the rest of her current medical therapy. No additional changes are made at this time. Patient is to continue to follow a low-carb/low sugar/low-fat diet and increase exercise as able for optimal blood sugar and cholesterol control. Patient is to return to my office in 6 months duration or sooner if any further problems or symptoms arise. (Please note that portions of this note were completed with a voice recognition program. Efforts were made to edit the dictations but occasionally words are mis-transcribed.)        No follow-ups on file. An electronic signature was used to authenticate this note.     --Marcelo Rojas, DO on 6/16/2021 at 7:12 AM

## 2021-06-16 NOTE — PROGRESS NOTES
Patient declines covid vaccine today. Declines tdap, shingles, and pneumonia vaccines. Declines colon screening. Declines mammogram screening (due in November). Declines completing MAWV.

## 2021-06-16 NOTE — PATIENT INSTRUCTIONS
Hospital Outpatient Visit on 06/10/2021   Component Date Value Ref Range Status    Cholesterol 06/10/2021 186  <200 mg/dL Final    Comment:    Cholesterol Guidelines:      <200  Desirable   200-240  Borderline      >240  Undesirable         HDL 06/10/2021 56  >40 mg/dL Final    Comment:    HDL Guidelines:    <40     Undesirable   40-59    Borderline    >59     Desirable         LDL Cholesterol 06/10/2021 114  0 - 130 mg/dL Final    Comment:    LDL Guidelines:     <100    Desirable   100-129   Near to/above Desirable   130-159   Borderline      >159   Undesirable     Direct (measured) LDL and calculated LDL are not interchangeable tests.  Chol/HDL Ratio 06/10/2021 3.3  <5 Final            Triglycerides 06/10/2021 81  <150 mg/dL Final    Comment:    Triglyceride Guidelines:     <150   Desirable   150-199  Borderline   200-499  High     >499   Very high   Based on AHA Guidelines for fasting triglyceride, October 2012.          VLDL 06/10/2021 NOT REPORTED  1 - 30 mg/dL Final    Magnesium 06/10/2021 2.1  1.6 - 2.6 mg/dL Final    Glucose 06/10/2021 99  70 - 99 mg/dL Final    BUN 06/10/2021 12  8 - 23 mg/dL Final    CREATININE 06/10/2021 0.70  0.50 - 0.90 mg/dL Final    Bun/Cre Ratio 06/10/2021 17  9 - 20 Final    Calcium 06/10/2021 9.8  8.6 - 10.4 mg/dL Final    Sodium 06/10/2021 135  135 - 144 mmol/L Final    Potassium 06/10/2021 4.7  3.7 - 5.3 mmol/L Final    Chloride 06/10/2021 99  98 - 107 mmol/L Final    CO2 06/10/2021 29  20 - 31 mmol/L Final    Anion Gap 06/10/2021 7* 9 - 17 mmol/L Final    Alkaline Phosphatase 06/10/2021 85  35 - 104 U/L Final    ALT 06/10/2021 15  5 - 33 U/L Final    AST 06/10/2021 24  <32 U/L Final    Total Bilirubin 06/10/2021 0.51  0.3 - 1.2 mg/dL Final    Total Protein 06/10/2021 7.9  6.4 - 8.3 g/dL Final    Albumin 06/10/2021 4.7  3.5 - 5.2 g/dL Final    Albumin/Globulin Ratio 06/10/2021 1.5  1.0 - 2.5 Final    GFR Non- 06/10/2021 >60  >60 mL/min

## 2021-06-29 ENCOUNTER — PATIENT MESSAGE (OUTPATIENT)
Dept: FAMILY MEDICINE CLINIC | Age: 71
End: 2021-06-29

## 2021-06-29 RX ORDER — CLINDAMYCIN HYDROCHLORIDE 300 MG/1
300 CAPSULE ORAL 3 TIMES DAILY
Qty: 30 CAPSULE | Refills: 0 | Status: SHIPPED | OUTPATIENT
Start: 2021-06-29 | End: 2021-07-09

## 2021-06-29 NOTE — TELEPHONE ENCOUNTER
From: Ana Navarrete  To: Irma Healy DO  Sent: 6/29/2021 2:48 PM EDT  Subject: Visit Follow-Up Question    Dr. Jorgito Bird,  I saw you on the 16th and we discussed my sinus drainage. I've been using the Mucinex and Netipotting as directed, and it is not helping. My whole face hurts when I bend over, and now my eye and ear hurt on one side, and I have a big bag under the eye. Is it possible for you to call in an antibiotic for me at [facility]?   Thank you,   Brando Fuentes

## 2021-07-26 DIAGNOSIS — F41.9 ANXIETY: ICD-10-CM

## 2021-07-26 RX ORDER — ALPRAZOLAM 0.25 MG/1
TABLET ORAL
Qty: 90 TABLET | Refills: 0 | Status: SHIPPED | OUTPATIENT
Start: 2021-07-26 | End: 2021-09-08

## 2021-07-26 NOTE — TELEPHONE ENCOUNTER
Marce Gutierrez called requesting a refill of the below medication which has been pended for you: last filled 5/24/2021 #90    Requested Prescriptions     Pending Prescriptions Disp Refills    ALPRAZolam (XANAX) 0.25 MG tablet [Pharmacy Med Name: ALPRAZolam Oral Tablet 0.25 MG] 90 tablet 0     Sig: TAKE 1 TABLET BY MOUTH THREE TIMES A DAY as needed for sleep or anxiety       Last Appointment Date: 6/16/2021  Next Appointment Date: 12/16/2021    Allergies   Allergen Reactions    Amiodarone Shortness Of Breath     Authoring Clinician or Source System: Elvera Blades  Respiratory difficulty    Amoxicillin Anaphylaxis    Beta Adrenergic Blockers Shortness Of Breath    Metoprolol Shortness Of Breath     \"Beta Blockers    Penicillin V Potassium Anaphylaxis    Lisinopril Other (See Comments)     Eyes were beating along with her heart beat/HTN    Dronedarone Other (See Comments)     Irregular Heart Beats  Authoring Clinician or Source System: Kyara Schmitt  ([place])  Edema and irregular heartbeats    Epinephrine Other (See Comments)     dental admin gave her afib      Statins     Morphine Nausea Only, Nausea And Vomiting and Other (See Comments)     Nausea  Other reaction(s): GI Upset  Nausea    Nexium [Esomeprazole Magnesium] Anxiety     Heart flutters more than normal.    Proton Pump Inhibitors Nausea And Vomiting and Anxiety     Other reaction(s): GI Distress, GI Upset  Heart flutters more than normal.

## 2021-07-26 NOTE — TELEPHONE ENCOUNTER
Controlled Substance Monitoring:    Acute and Chronic Pain Monitoring:   RX Monitoring 7/26/2021   Attestation -   Periodic Controlled Substance Monitoring Possible medication side effects, risk of tolerance/dependence & alternative treatments discussed.    Chronic Pain > 80 MEDD -   Chronic Pain > 120 MEDD -

## 2021-08-16 NOTE — TELEPHONE ENCOUNTER
Marshall Thrasher called requesting a refill of the below medication which has been pended for you:     Requested Prescriptions     Pending Prescriptions Disp Refills    potassium chloride (MICRO-K) 10 MEQ extended release capsule [Pharmacy Med Name: Potassium Chloride ER Oral Capsule Extended Release 10 MEQ] 90 capsule 0     Sig: Take 1 capsule by mouth daily as needed (if she takes Lasix)       Last Appointment Date: 6/16/2021  Next Appointment Date: 12/16/2021    Allergies   Allergen Reactions    Amiodarone Shortness Of Breath     Authoring Clinician or Source System: Armida Gonzalez  Respiratory difficulty    Amoxicillin Anaphylaxis    Beta Adrenergic Blockers Shortness Of Breath    Metoprolol Shortness Of Breath     \"Beta Blockers    Penicillin V Potassium Anaphylaxis    Lisinopril Other (See Comments)     Eyes were beating along with her heart beat/HTN    Dronedarone Other (See Comments)     Irregular Heart Beats  Authoring Clinician or Source System: Kyara Schmitt  ([place])  Edema and irregular heartbeats    Epinephrine Other (See Comments)     dental admin gave her afib      Statins     Morphine Nausea Only, Nausea And Vomiting and Other (See Comments)     Nausea  Other reaction(s): GI Upset  Nausea    Nexium [Esomeprazole Magnesium] Anxiety     Heart flutters more than normal.    Proton Pump Inhibitors Nausea And Vomiting and Anxiety     Other reaction(s): GI Distress, GI Upset  Heart flutters more than normal.

## 2021-08-17 RX ORDER — POTASSIUM CHLORIDE 750 MG/1
10 CAPSULE, EXTENDED RELEASE ORAL DAILY PRN
Qty: 90 CAPSULE | Refills: 0 | Status: SHIPPED | OUTPATIENT
Start: 2021-08-17 | End: 2022-03-18 | Stop reason: SDUPTHER

## 2021-09-03 DIAGNOSIS — F41.9 ANXIETY: ICD-10-CM

## 2021-09-08 RX ORDER — ALPRAZOLAM 0.25 MG/1
TABLET ORAL
Qty: 90 TABLET | Refills: 0 | Status: SHIPPED | OUTPATIENT
Start: 2021-09-08 | End: 2021-10-11

## 2021-09-08 NOTE — TELEPHONE ENCOUNTER
Marshall Thrasher called requesting a refill of the below medication which has been pended for you:last filled 7/26/2021 #90     Requested Prescriptions     Pending Prescriptions Disp Refills    ALPRAZolam (XANAX) 0.25 MG tablet [Pharmacy Med Name: ALPRAZolam Oral Tablet 0.25 MG] 90 tablet 0     Sig: TAKE 1 TABLET BY MOUTH THREE TIMES A DAY AS NEEDED FOR SLEEP FOR ANXIETY       Last Appointment Date: 6/16/2021  Next Appointment Date: 12/16/2021    Allergies   Allergen Reactions    Amiodarone Shortness Of Breath     Authoring Clinician or Source System: Armida Gonzalez  Respiratory difficulty    Amoxicillin Anaphylaxis    Beta Adrenergic Blockers Shortness Of Breath    Metoprolol Shortness Of Breath     \"Beta Blockers    Penicillin V Potassium Anaphylaxis    Lisinopril Other (See Comments)     Eyes were beating along with her heart beat/HTN    Dronedarone Other (See Comments)     Irregular Heart Beats  Authoring Clinician or Source System: Kyara Schmitt  ([place])  Edema and irregular heartbeats    Epinephrine Other (See Comments)     dental admin gave her afib      Statins     Morphine Nausea Only, Nausea And Vomiting and Other (See Comments)     Nausea  Other reaction(s): GI Upset  Nausea    Nexium [Esomeprazole Magnesium] Anxiety     Heart flutters more than normal.    Proton Pump Inhibitors Nausea And Vomiting and Anxiety     Other reaction(s): GI Distress, GI Upset  Heart flutters more than normal.

## 2021-10-10 DIAGNOSIS — F41.9 ANXIETY: ICD-10-CM

## 2021-10-11 RX ORDER — ALPRAZOLAM 0.25 MG/1
TABLET ORAL
Qty: 90 TABLET | Refills: 0 | Status: SHIPPED | OUTPATIENT
Start: 2021-10-11 | End: 2021-11-16

## 2021-10-11 NOTE — TELEPHONE ENCOUNTER
Angela Blanco called requesting a refill of the below medication which has been pended for you:     Requested Prescriptions     Pending Prescriptions Disp Refills    ALPRAZolam (XANAX) 0.25 MG tablet [Pharmacy Med Name: ALPRAZolam Oral Tablet 0.25 MG] 90 tablet 0     Sig: TAKE 1 TABLET BY MOUTH THREE TIMES A DAY AS NEEDED for sleep or anxiety       Last Appointment Date: 6/16/2021  Next Appointment Date: 12/16/2021    Allergies   Allergen Reactions    Amiodarone Shortness Of Breath     Authoring Clinician or Source System: Miriam Ayers  Respiratory difficulty    Amoxicillin Anaphylaxis    Beta Adrenergic Blockers Shortness Of Breath    Metoprolol Shortness Of Breath     \"Beta Blockers    Penicillin V Potassium Anaphylaxis    Lisinopril Other (See Comments)     Eyes were beating along with her heart beat/HTN    Dronedarone Other (See Comments)     Irregular Heart Beats  Authoring Clinician or Source System: Kyara Schmitt  ([place])  Edema and irregular heartbeats    Epinephrine Other (See Comments)     dental admin gave her afib      Statins     Morphine Nausea Only, Nausea And Vomiting and Other (See Comments)     Nausea  Other reaction(s): GI Upset  Nausea    Nexium [Esomeprazole Magnesium] Anxiety     Heart flutters more than normal.    Proton Pump Inhibitors Nausea And Vomiting and Anxiety     Other reaction(s): GI Distress, GI Upset  Heart flutters more than normal.

## 2021-12-13 ENCOUNTER — HOSPITAL ENCOUNTER (OUTPATIENT)
Dept: LAB | Age: 71
Discharge: HOME | End: 2021-12-13
Payer: MEDICARE

## 2021-12-13 DIAGNOSIS — R73.01 IMPAIRED FASTING GLUCOSE: ICD-10-CM

## 2021-12-13 DIAGNOSIS — M80.08XA AGE-RELATED OSTEOPOROSIS WITH CURRENT PATHOLOGICAL FRACTURE, VERTEBRA(E), INITIAL ENCOUNTER FOR FRACTURE (HCC): ICD-10-CM

## 2021-12-13 DIAGNOSIS — E83.42 HYPOMAGNESEMIA: ICD-10-CM

## 2021-12-13 DIAGNOSIS — E78.2 MIXED HYPERLIPIDEMIA: ICD-10-CM

## 2021-12-13 DIAGNOSIS — I10 ESSENTIAL HYPERTENSION: ICD-10-CM

## 2021-12-13 LAB
ABSOLUTE EOS #: 0.1 K/UL (ref 0–0.44)
ABSOLUTE IMMATURE GRANULOCYTE: 0.04 K/UL (ref 0–0.3)
ABSOLUTE LYMPH #: 1.77 K/UL (ref 1.1–3.7)
ABSOLUTE MONO #: 0.71 K/UL (ref 0.1–1.2)
ALBUMIN SERPL-MCNC: 4.7 G/DL (ref 3.5–5.2)
ALBUMIN/GLOBULIN RATIO: 1.4 (ref 1–2.5)
ALP BLD-CCNC: 80 U/L (ref 35–104)
ALT SERPL-CCNC: 14 U/L (ref 5–33)
ANION GAP SERPL CALCULATED.3IONS-SCNC: 12 MMOL/L (ref 9–17)
AST SERPL-CCNC: 21 U/L
BASOPHILS # BLD: 0 % (ref 0–2)
BASOPHILS ABSOLUTE: 0.03 K/UL (ref 0–0.2)
BILIRUB SERPL-MCNC: 0.46 MG/DL (ref 0.3–1.2)
BUN BLDV-MCNC: 11 MG/DL (ref 8–23)
BUN/CREAT BLD: 19 (ref 9–20)
CALCIUM SERPL-MCNC: 9.5 MG/DL (ref 8.6–10.4)
CHLORIDE BLD-SCNC: 98 MMOL/L (ref 98–107)
CHOLESTEROL/HDL RATIO: 3.4
CHOLESTEROL: 196 MG/DL
CO2: 24 MMOL/L (ref 20–31)
CREAT SERPL-MCNC: 0.57 MG/DL (ref 0.5–0.9)
DIFFERENTIAL TYPE: ABNORMAL
EOSINOPHILS RELATIVE PERCENT: 1 % (ref 1–4)
GFR AFRICAN AMERICAN: >60 ML/MIN
GFR NON-AFRICAN AMERICAN: >60 ML/MIN
GFR SERPL CREATININE-BSD FRML MDRD: ABNORMAL ML/MIN/{1.73_M2}
GFR SERPL CREATININE-BSD FRML MDRD: ABNORMAL ML/MIN/{1.73_M2}
GLUCOSE BLD-MCNC: 102 MG/DL (ref 70–99)
HCT VFR BLD CALC: 41.7 % (ref 36.3–47.1)
HDLC SERPL-MCNC: 58 MG/DL
HEMOGLOBIN: 13.8 G/DL (ref 11.9–15.1)
IMMATURE GRANULOCYTES: 1 %
LDL CHOLESTEROL: 118 MG/DL (ref 0–130)
LYMPHOCYTES # BLD: 22 % (ref 24–43)
MAGNESIUM: 2.1 MG/DL (ref 1.6–2.6)
MCH RBC QN AUTO: 31.8 PG (ref 25.2–33.5)
MCHC RBC AUTO-ENTMCNC: 33.1 G/DL (ref 25.2–33.5)
MCV RBC AUTO: 96.1 FL (ref 82.6–102.9)
MONOCYTES # BLD: 9 % (ref 3–12)
NRBC AUTOMATED: 0 PER 100 WBC
PDW BLD-RTO: 11.9 % (ref 11.8–14.4)
PLATELET # BLD: 286 K/UL (ref 138–453)
PLATELET ESTIMATE: ABNORMAL
PMV BLD AUTO: 8.9 FL (ref 8.1–13.5)
POTASSIUM SERPL-SCNC: 4.6 MMOL/L (ref 3.7–5.3)
RBC # BLD: 4.34 M/UL (ref 3.95–5.11)
RBC # BLD: ABNORMAL 10*6/UL
SEG NEUTROPHILS: 67 % (ref 36–65)
SEGMENTED NEUTROPHILS ABSOLUTE COUNT: 5.5 K/UL (ref 1.5–8.1)
SODIUM BLD-SCNC: 134 MMOL/L (ref 135–144)
TOTAL PROTEIN: 8 G/DL (ref 6.4–8.3)
TRIGL SERPL-MCNC: 102 MG/DL
VITAMIN D 25-HYDROXY: 29.4 NG/ML (ref 30–100)
VLDLC SERPL CALC-MCNC: NORMAL MG/DL (ref 1–30)
WBC # BLD: 8.2 K/UL (ref 3.5–11.3)
WBC # BLD: ABNORMAL 10*3/UL

## 2021-12-13 PROCEDURE — 83735 ASSAY OF MAGNESIUM: CPT

## 2021-12-13 PROCEDURE — 80061 LIPID PANEL: CPT

## 2021-12-13 PROCEDURE — 85025 COMPLETE CBC W/AUTO DIFF WBC: CPT

## 2021-12-13 PROCEDURE — 36415 COLL VENOUS BLD VENIPUNCTURE: CPT

## 2021-12-13 PROCEDURE — 82306 VITAMIN D 25 HYDROXY: CPT

## 2021-12-13 PROCEDURE — 80053 COMPREHEN METABOLIC PANEL: CPT

## 2021-12-13 PROCEDURE — 83036 HEMOGLOBIN GLYCOSYLATED A1C: CPT

## 2021-12-14 LAB
ESTIMATED AVERAGE GLUCOSE: 117 MG/DL
HBA1C MFR BLD: 5.7 % (ref 4–6)

## 2021-12-16 ENCOUNTER — OFFICE VISIT (OUTPATIENT)
Dept: FAMILY MEDICINE CLINIC | Age: 71
End: 2021-12-16
Payer: MEDICARE

## 2021-12-16 VITALS
WEIGHT: 118 LBS | BODY MASS INDEX: 21.71 KG/M2 | HEIGHT: 62 IN | HEART RATE: 84 BPM | OXYGEN SATURATION: 93 % | SYSTOLIC BLOOD PRESSURE: 134 MMHG | DIASTOLIC BLOOD PRESSURE: 82 MMHG | RESPIRATION RATE: 12 BRPM

## 2021-12-16 DIAGNOSIS — R73.01 IMPAIRED FASTING GLUCOSE: ICD-10-CM

## 2021-12-16 DIAGNOSIS — E78.2 MIXED HYPERLIPIDEMIA: ICD-10-CM

## 2021-12-16 DIAGNOSIS — G89.29 CHRONIC MIDLINE LOW BACK PAIN, UNSPECIFIED WHETHER SCIATICA PRESENT: ICD-10-CM

## 2021-12-16 DIAGNOSIS — E55.9 VITAMIN D DEFICIENCY: ICD-10-CM

## 2021-12-16 DIAGNOSIS — M54.50 CHRONIC MIDLINE LOW BACK PAIN, UNSPECIFIED WHETHER SCIATICA PRESENT: ICD-10-CM

## 2021-12-16 DIAGNOSIS — J01.40 ACUTE NON-RECURRENT PANSINUSITIS: Primary | ICD-10-CM

## 2021-12-16 DIAGNOSIS — I48.0 PAROXYSMAL ATRIAL FIBRILLATION (HCC): ICD-10-CM

## 2021-12-16 DIAGNOSIS — I10 ESSENTIAL HYPERTENSION: ICD-10-CM

## 2021-12-16 DIAGNOSIS — E83.42 HYPOMAGNESEMIA: ICD-10-CM

## 2021-12-16 DIAGNOSIS — F41.9 ANXIETY: ICD-10-CM

## 2021-12-16 PROCEDURE — 4040F PNEUMOC VAC/ADMIN/RCVD: CPT | Performed by: FAMILY MEDICINE

## 2021-12-16 PROCEDURE — 99213 OFFICE O/P EST LOW 20 MIN: CPT | Performed by: FAMILY MEDICINE

## 2021-12-16 PROCEDURE — 3017F COLORECTAL CA SCREEN DOC REV: CPT | Performed by: FAMILY MEDICINE

## 2021-12-16 PROCEDURE — 1036F TOBACCO NON-USER: CPT | Performed by: FAMILY MEDICINE

## 2021-12-16 PROCEDURE — 1090F PRES/ABSN URINE INCON ASSESS: CPT | Performed by: FAMILY MEDICINE

## 2021-12-16 PROCEDURE — 99214 OFFICE O/P EST MOD 30 MIN: CPT | Performed by: FAMILY MEDICINE

## 2021-12-16 PROCEDURE — 1123F ACP DISCUSS/DSCN MKR DOCD: CPT | Performed by: FAMILY MEDICINE

## 2021-12-16 PROCEDURE — G8484 FLU IMMUNIZE NO ADMIN: HCPCS | Performed by: FAMILY MEDICINE

## 2021-12-16 PROCEDURE — G8427 DOCREV CUR MEDS BY ELIG CLIN: HCPCS | Performed by: FAMILY MEDICINE

## 2021-12-16 PROCEDURE — G8420 CALC BMI NORM PARAMETERS: HCPCS | Performed by: FAMILY MEDICINE

## 2021-12-16 PROCEDURE — G8399 PT W/DXA RESULTS DOCUMENT: HCPCS | Performed by: FAMILY MEDICINE

## 2021-12-16 RX ORDER — CLINDAMYCIN HYDROCHLORIDE 300 MG/1
300 CAPSULE ORAL 3 TIMES DAILY
Qty: 30 CAPSULE | Refills: 0 | Status: SHIPPED | OUTPATIENT
Start: 2021-12-16 | End: 2021-12-20 | Stop reason: SINTOL

## 2021-12-16 RX ORDER — ALPRAZOLAM 0.25 MG/1
0.25 TABLET ORAL 3 TIMES DAILY PRN
Qty: 90 TABLET | Refills: 2 | Status: SHIPPED | OUTPATIENT
Start: 2021-12-16 | End: 2022-03-18

## 2021-12-16 RX ORDER — LIDOCAINE 50 MG/G
1 PATCH TOPICAL DAILY
Qty: 30 PATCH | Refills: 5 | Status: SHIPPED | OUTPATIENT
Start: 2021-12-16 | End: 2022-06-14

## 2021-12-16 ASSESSMENT — ENCOUNTER SYMPTOMS
SHORTNESS OF BREATH: 0
WHEEZING: 0
RHINORRHEA: 1
ABDOMINAL PAIN: 0
SORE THROAT: 0
EYE REDNESS: 0
DIARRHEA: 0
NAUSEA: 0
COUGH: 0
CONSTIPATION: 0
TROUBLE SWALLOWING: 0
SINUS PRESSURE: 1
VOMITING: 0
BACK PAIN: 1
SINUS PAIN: 1
EYE DISCHARGE: 0

## 2021-12-16 NOTE — PROGRESS NOTES
mg/dL    LDL Cholesterol 118 0 - 130 mg/dL    Chol/HDL Ratio 3.4 <5    Triglycerides 102 <150 mg/dL    VLDL NOT REPORTED 1 - 30 mg/dL   Hemoglobin A1C   Result Value Ref Range    Hemoglobin A1C 5.7 4.0 - 6.0 %    Estimated Avg Glucose 117 mg/dL   Comprehensive Metabolic Panel   Result Value Ref Range    Glucose 102 (H) 70 - 99 mg/dL    BUN 11 8 - 23 mg/dL    CREATININE 0.57 0.50 - 0.90 mg/dL    Bun/Cre Ratio 19 9 - 20    Calcium 9.5 8.6 - 10.4 mg/dL    Sodium 134 (L) 135 - 144 mmol/L    Potassium 4.6 3.7 - 5.3 mmol/L    Chloride 98 98 - 107 mmol/L    CO2 24 20 - 31 mmol/L    Anion Gap 12 9 - 17 mmol/L    Alkaline Phosphatase 80 35 - 104 U/L    ALT 14 5 - 33 U/L    AST 21 <32 U/L    Total Bilirubin 0.46 0.3 - 1.2 mg/dL    Total Protein 8.0 6.4 - 8.3 g/dL    Albumin 4.7 3.5 - 5.2 g/dL    Albumin/Globulin Ratio 1.4 1.0 - 2.5    GFR Non-African American >60 >60 mL/min    GFR African American >60 >60 mL/min    GFR Comment          GFR Staging NOT REPORTED    CBC Auto Differential   Result Value Ref Range    WBC 8.2 3.5 - 11.3 k/uL    RBC 4.34 3.95 - 5.11 m/uL    Hemoglobin 13.8 11.9 - 15.1 g/dL    Hematocrit 41.7 36.3 - 47.1 %    MCV 96.1 82.6 - 102.9 fL    MCH 31.8 25.2 - 33.5 pg    MCHC 33.1 25.2 - 33.5 g/dL    RDW 11.9 11.8 - 14.4 %    Platelets 114 484 - 802 k/uL    MPV 8.9 8.1 - 13.5 fL    NRBC Automated 0.0 0.0 per 100 WBC    Differential Type NOT REPORTED     Seg Neutrophils 67 (H) 36 - 65 %    Lymphocytes 22 (L) 24 - 43 %    Monocytes 9 3 - 12 %    Eosinophils % 1 1 - 4 %    Basophils 0 0 - 2 %    Immature Granulocytes 1 (H) 0 %    Segs Absolute 5.50 1.50 - 8.10 k/uL    Absolute Lymph # 1.77 1.10 - 3.70 k/uL    Absolute Mono # 0.71 0.10 - 1.20 k/uL    Absolute Eos # 0.10 0.00 - 0.44 k/uL    Basophils Absolute 0.03 0.00 - 0.20 k/uL    Absolute Immature Granulocyte 0.04 0.00 - 0.30 k/uL    WBC Morphology NOT REPORTED     RBC Morphology NOT REPORTED     Platelet Estimate NOT REPORTED       Other review of systems are as noted below. Preventative measures are reviewed today. See health maintenance section for complete preventative plan of care. Did review patient's med list, allergies, social history, fam history, pmhx and pshx today as noted in the record. Review of Systems   Constitutional: Negative for chills, fatigue and fever. HENT: Positive for congestion, postnasal drip, rhinorrhea, sinus pressure and sinus pain. Negative for ear pain, sore throat and trouble swallowing. Eyes: Negative for discharge and redness. Respiratory: Negative for cough, shortness of breath and wheezing. Cardiovascular: Negative for chest pain. Gastrointestinal: Negative for abdominal pain, constipation, diarrhea, nausea and vomiting. Genitourinary: Negative for dysuria, flank pain, frequency and urgency. Musculoskeletal: Positive for back pain. Negative for arthralgias, myalgias and neck pain. Skin: Negative for rash and wound. Allergic/Immunologic: Negative for environmental allergies. Neurological: Negative for dizziness, weakness, light-headedness and headaches. Hematological: Negative for adenopathy. Psychiatric/Behavioral: Negative. Prior to Visit Medications    Medication Sig Taking?  Authorizing Provider   ALPRAZolam (XANAX) 0.25 MG tablet TAKE 1 TABLET BY MOUTH THREE TIMES A DAY AS NEEDED for sleep or anxiety  Slick Perales MD   potassium chloride (MICRO-K) 10 MEQ extended release capsule Take 1 capsule by mouth daily as needed (if she takes Lasix)  Ken Fall, APRN - CNP   dilTIAZem (TIAZAC) 240 MG extended release capsule Take 240 mg by mouth daily  Historical Provider, MD   aspirin 81 MG chewable tablet Take 81 mg by mouth daily  Historical Provider, MD   valACYclovir (VALTREX) 500 MG tablet 1 tab bid  Ammy Santacruz DO   dilTIAZem (CARDIZEM) 60 MG tablet TAKE 1 TABLET BY MOUTH UP TO THREE TIMES DAILY AS NEEDED FOR A-FIB  Ammy Santacruz DO   famotidine (PEPCID) 20 MG tablet Take 1 tablet by mouth 2 times daily  Luz Maria Purvis, DO   Aspirin Buf,KbIsm-UwEkd-TaLpb, (BUFFERED ASPIRIN) 325 MG TABS Take by mouth daily as needed for Pain  Historical Provider, MD   furosemide (LASIX) 20 MG tablet take 1 tablet by mouth as needed for weight gain of 3-4 pounds overnight  Historical Provider, MD   melatonin 3 MG TABS tablet Take 3 mg by mouth  Historical Provider, MD   Plant Sterols and Stanols (CHOLESTOFF PO) Take 1,800 mg by mouth daily  Historical Provider, MD        Social History     Tobacco Use    Smoking status: Former Smoker     Packs/day: 0.25     Years: 20.00     Pack years: 5.00     Types: Cigarettes     Quit date: 5/15/2009     Years since quittin.5    Smokeless tobacco: Never Used    Tobacco comment: social   Substance Use Topics    Alcohol use: No        There were no vitals filed for this visit. Estimated body mass index is 21.4 kg/m² as calculated from the following:    Height as of 21: 5' 2\" (1.575 m). Weight as of 21: 117 lb (53.1 kg). Physical Exam  Vitals and nursing note reviewed. Constitutional:       General: She is not in acute distress. Appearance: Normal appearance. She is well-developed. She is not diaphoretic. HENT:      Head: Normocephalic and atraumatic. Right Ear: External ear normal.      Left Ear: External ear normal.      Ears:      Comments: TMs dull with fluid behind the TM     Nose: Congestion and rhinorrhea present. Mouth/Throat:      Pharynx: No posterior oropharyngeal erythema. Comments: Post nasal drainage noted  Eyes:      General: No scleral icterus. Right eye: No discharge. Left eye: No discharge. Conjunctiva/sclera: Conjunctivae normal.      Pupils: Pupils are equal, round, and reactive to light. Neck:      Thyroid: No thyromegaly. Cardiovascular:      Rate and Rhythm: Normal rate and regular rhythm. Heart sounds: Normal heart sounds.    Pulmonary:      Effort: Pulmonary effort is normal. No respiratory distress. Breath sounds: Normal breath sounds. No wheezing. Musculoskeletal:      Cervical back: Normal range of motion and neck supple. Lymphadenopathy:      Cervical: Cervical adenopathy present. Skin:     General: Skin is warm and dry. Findings: No rash. Neurological:      Mental Status: She is alert and oriented to person, place, and time. Psychiatric:         Behavior: Behavior normal.         Thought Content: Thought content normal.         Judgment: Judgment normal.           ASSESSMENT/PLAN:    Encounter Diagnoses   Name Primary?  Acute non-recurrent pansinusitis Yes    Essential hypertension     Mixed hyperlipidemia     Impaired fasting glucose     Hypomagnesemia     Paroxysmal atrial fibrillation (HCC)     Anxiety     Vitamin D deficiency     Chronic midline low back pain, unspecified whether sciatica present        Orders Placed This Encounter   Medications    ALPRAZolam (XANAX) 0.25 MG tablet     Sig: Take 1 tablet by mouth 3 times daily as needed for Anxiety for up to 90 days. Dispense:  90 tablet     Refill:  2    DISCONTD: Handicap Placard MISC     Sig: Duration 5 years     Dispense:  1 each     Refill:  0    lidocaine (LIDODERM) 5 %     Sig: Place 1 patch onto the skin daily 12 hours on, 12 hours off.      Dispense:  30 patch     Refill:  5    clindamycin (CLEOCIN) 300 MG capsule     Sig: Take 1 capsule by mouth 3 times daily for 10 days     Dispense:  30 capsule     Refill:  0    Handicap Placard MISC     Sig: Duration 5 years     Dispense:  1 each     Refill:  0     Orders Placed This Encounter   Procedures    CBC Auto Differential     Standing Status:   Future     Standing Expiration Date:   12/16/2022    Comprehensive Metabolic Panel     Standing Status:   Future     Standing Expiration Date:   12/16/2022    Hemoglobin A1C     Standing Status:   Future     Standing Expiration Date:   12/16/2022    Lipid Panel

## 2021-12-16 NOTE — PROGRESS NOTES
Patient declines shingles, tetanus, pneumonia, and influenza vaccines. She would like to discuss the Covid vaccine with Dr. Pari Ken today.

## 2021-12-16 NOTE — PATIENT INSTRUCTIONS
Hospital Outpatient Visit on 12/13/2021   Component Date Value Ref Range Status    Vit D, 25-Hydroxy 12/13/2021 29.4* 30.0 - 100.0 ng/mL Final    Comment:    Reference Range:  Vitamin D status         Range   Deficiency              <20 ng/mL   Mild Deficiency       20-30 ng/mL   Sufficiency           ng/mL   Toxicity               >100 ng/mL      Magnesium 12/13/2021 2.1  1.6 - 2.6 mg/dL Final    Cholesterol 12/13/2021 196  <200 mg/dL Final    Comment:    Cholesterol Guidelines:      <200  Desirable   200-240  Borderline      >240  Undesirable         HDL 12/13/2021 58  >40 mg/dL Final    Comment:    HDL Guidelines:    <40     Undesirable   40-59    Borderline    >59     Desirable         LDL Cholesterol 12/13/2021 118  0 - 130 mg/dL Final    Comment:    LDL Guidelines:     <100    Desirable   100-129   Near to/above Desirable   130-159   Borderline      >159   Undesirable     Direct (measured) LDL and calculated LDL are not interchangeable tests.  Chol/HDL Ratio 12/13/2021 3.4  <5 Final            Triglycerides 12/13/2021 102  <150 mg/dL Final    Comment:    Triglyceride Guidelines:     <150   Desirable   150-199  Borderline   200-499  High     >499   Very high   Based on AHA Guidelines for fasting triglyceride, October 2012.  VLDL 12/13/2021 NOT REPORTED  1 - 30 mg/dL Final    Hemoglobin A1C 12/13/2021 5.7  4.0 - 6.0 % Final    Estimated Avg Glucose 12/13/2021 117  mg/dL Final    Comment: The ADA and AACC recommend providing the estimated average glucose result to permit better   patient understanding of their HBA1c result.       Glucose 12/13/2021 102* 70 - 99 mg/dL Final    BUN 12/13/2021 11  8 - 23 mg/dL Final    CREATININE 12/13/2021 0.57  0.50 - 0.90 mg/dL Final    Bun/Cre Ratio 12/13/2021 19  9 - 20 Final    Calcium 12/13/2021 9.5  8.6 - 10.4 mg/dL Final    Sodium 12/13/2021 134* 135 - 144 mmol/L Final    Potassium 12/13/2021 4.6  3.7 - 5.3 mmol/L Final    Chloride  Absolute Lymph # 12/13/2021 1.77  1.10 - 3.70 k/uL Final    Absolute Mono # 12/13/2021 0.71  0.10 - 1.20 k/uL Final    Absolute Eos # 12/13/2021 0.10  0.00 - 0.44 k/uL Final    Basophils Absolute 12/13/2021 0.03  0.00 - 0.20 k/uL Final    Absolute Immature Granulocyte 12/13/2021 0.04  0.00 - 0.30 k/uL Final    WBC Morphology 12/13/2021 NOT REPORTED   Final    RBC Morphology 12/13/2021 NOT REPORTED   Final    Platelet Estimate 66/24/6819 NOT REPORTED   Final

## 2021-12-20 ENCOUNTER — PATIENT MESSAGE (OUTPATIENT)
Dept: FAMILY MEDICINE CLINIC | Age: 71
End: 2021-12-20

## 2021-12-20 RX ORDER — CEPHALEXIN 500 MG/1
500 CAPSULE ORAL 3 TIMES DAILY
Qty: 30 CAPSULE | Refills: 0 | Status: SHIPPED | OUTPATIENT
Start: 2021-12-20 | End: 2021-12-22 | Stop reason: ALTCHOICE

## 2021-12-20 NOTE — TELEPHONE ENCOUNTER
From: Lio Parker  To: Dr. Christensen Shadow: 12/20/2021 1:43 PM EST  Subject: Clindamycin making me sick    Diarrhea, vomiting, sinuses are killing me, metal taste, and a bad headache. No fever. I have taken Keflex in the past with no problems if you cannot come up with something else.    Thank you,   Tenisha Moncada

## 2021-12-20 NOTE — TELEPHONE ENCOUNTER
From: Anirudh Louise  To: Dr. Pagan Poll: 12/20/2021 7:54 AM EST  Subject: Vitamin D and calcium channel blockers    Good morning Dr. Eliezer Garay,  I thought I would send this info to you as to why I cannot take Vitamin D. After having issues I searched 'Dr. Itzel Arteaga'    Heart Medications  Calcium affects the heart and because vitamin D increases calcium levels, it can cause problems with a number of common heart medications or drugs that affect the heart. Do not take vitamin D with a heart medication unless you have talked to your doctor first. Some heart medications affected by vitamin D are digoxin and calcium channel blockers like diltiazem, verapamil, nifedipine, nicardipine and amlodipine. Vitamin D can also reduce the effectiveness of some cholesterol lowering drugs known as statins, including atorvastatin and lovastatin, which are used to reduce a major risk factor for heart disease. [email or web address]-d-[email or web address]-[email or web address]

## 2021-12-22 ENCOUNTER — TELEPHONE (OUTPATIENT)
Dept: FAMILY MEDICINE CLINIC | Age: 71
End: 2021-12-22

## 2021-12-22 ENCOUNTER — VIRTUAL VISIT (OUTPATIENT)
Dept: PRIMARY CARE CLINIC | Age: 71
End: 2021-12-22
Payer: MEDICARE

## 2021-12-22 DIAGNOSIS — U07.1 COVID-19: ICD-10-CM

## 2021-12-22 PROCEDURE — 3017F COLORECTAL CA SCREEN DOC REV: CPT | Performed by: FAMILY MEDICINE

## 2021-12-22 PROCEDURE — G8427 DOCREV CUR MEDS BY ELIG CLIN: HCPCS | Performed by: FAMILY MEDICINE

## 2021-12-22 PROCEDURE — 99214 OFFICE O/P EST MOD 30 MIN: CPT | Performed by: FAMILY MEDICINE

## 2021-12-22 PROCEDURE — 1123F ACP DISCUSS/DSCN MKR DOCD: CPT | Performed by: FAMILY MEDICINE

## 2021-12-22 PROCEDURE — G8399 PT W/DXA RESULTS DOCUMENT: HCPCS | Performed by: FAMILY MEDICINE

## 2021-12-22 PROCEDURE — 99211 OFF/OP EST MAY X REQ PHY/QHP: CPT

## 2021-12-22 PROCEDURE — 4040F PNEUMOC VAC/ADMIN/RCVD: CPT | Performed by: FAMILY MEDICINE

## 2021-12-22 PROCEDURE — 1090F PRES/ABSN URINE INCON ASSESS: CPT | Performed by: FAMILY MEDICINE

## 2021-12-22 RX ORDER — SODIUM CHLORIDE 9 MG/ML
INJECTION, SOLUTION INTRAVENOUS CONTINUOUS
OUTPATIENT
Start: 2021-12-23

## 2021-12-22 RX ORDER — ALBUTEROL SULFATE 90 UG/1
4 AEROSOL, METERED RESPIRATORY (INHALATION) PRN
OUTPATIENT
Start: 2021-12-23

## 2021-12-22 RX ORDER — ACETAMINOPHEN 325 MG/1
650 TABLET ORAL
OUTPATIENT
Start: 2021-12-23

## 2021-12-22 RX ORDER — SODIUM CHLORIDE 0.9 % (FLUSH) 0.9 %
5-40 SYRINGE (ML) INJECTION PRN
OUTPATIENT
Start: 2021-12-23

## 2021-12-22 RX ORDER — DIPHENHYDRAMINE HYDROCHLORIDE 50 MG/ML
50 INJECTION INTRAMUSCULAR; INTRAVENOUS
OUTPATIENT
Start: 2021-12-23

## 2021-12-22 RX ORDER — SODIUM CHLORIDE 9 MG/ML
25 INJECTION, SOLUTION INTRAVENOUS PRN
OUTPATIENT
Start: 2021-12-23

## 2021-12-22 RX ORDER — EPINEPHRINE 1 MG/ML
0.3 INJECTION, SOLUTION, CONCENTRATE INTRAVENOUS PRN
OUTPATIENT
Start: 2021-12-23

## 2021-12-22 RX ORDER — ONDANSETRON 2 MG/ML
8 INJECTION INTRAMUSCULAR; INTRAVENOUS
OUTPATIENT
Start: 2021-12-23

## 2021-12-22 RX ORDER — HEPARIN SODIUM (PORCINE) LOCK FLUSH IV SOLN 100 UNIT/ML 100 UNIT/ML
500 SOLUTION INTRAVENOUS PRN
OUTPATIENT
Start: 2021-12-23

## 2021-12-22 ASSESSMENT — ENCOUNTER SYMPTOMS
NAUSEA: 0
TROUBLE SWALLOWING: 0
EYE DISCHARGE: 0
CONSTIPATION: 0
RHINORRHEA: 1
SINUS PRESSURE: 1
SORE THROAT: 0
VOMITING: 0
ABDOMINAL PAIN: 0
WHEEZING: 0
SINUS PAIN: 1
SHORTNESS OF BREATH: 0
DIARRHEA: 0
COUGH: 1
EYE REDNESS: 0

## 2021-12-22 NOTE — TELEPHONE ENCOUNTER
Pt calling stating she took a home covid test and it was positive, pt states she has headache, cough and no appetite but pt is concerned as she has an artifical heart valve, do you want to order infusion, please advise at above number.

## 2021-12-22 NOTE — PROGRESS NOTES
Psychiatric/Behavioral: Negative. Prior to Visit Medications    Medication Sig Taking? Authorizing Provider   ALPRAZolam (XANAX) 0.25 MG tablet Take 1 tablet by mouth 3 times daily as needed for Anxiety for up to 90 days. Yes Marcie Read DO   lidocaine (LIDODERM) 5 % Place 1 patch onto the skin daily 12 hours on, 12 hours off.  Yes Marcie Read, DO   potassium chloride (MICRO-K) 10 MEQ extended release capsule Take 1 capsule by mouth daily as needed (if she takes Lasix) Yes Erick Tsai, APRN - CNP   dilTIAZem (TIAZAC) 240 MG extended release capsule Take 240 mg by mouth daily Yes Historical Provider, MD   aspirin 81 MG chewable tablet Take 81 mg by mouth daily Yes Historical Provider, MD   valACYclovir (VALTREX) 500 MG tablet 1 tab bid Yes Luz Maria Purvis,    dilTIAZem (CARDIZEM) 60 MG tablet TAKE 1 TABLET BY MOUTH UP TO THREE TIMES DAILY AS NEEDED FOR A-FIB Yes Marcie Read DO   famotidine (PEPCID) 20 MG tablet Take 1 tablet by mouth 2 times daily Yes Marcie Read DO   furosemide (LASIX) 20 MG tablet take 1 tablet by mouth as needed for weight gain of 3-4 pounds overnight Yes Historical Provider, MD   melatonin 3 MG TABS tablet Take 3 mg by mouth Yes Historical Provider, MD   Plant Sterols and Stanols (CHOLESTOFF PO) Take 1,800 mg by mouth daily Yes Historical Provider, MD       Social History     Tobacco Use    Smoking status: Former Smoker     Packs/day: 0.25     Years: 20.00     Pack years: 5.00     Types: Cigarettes     Quit date: 5/15/2009     Years since quittin.6    Smokeless tobacco: Never Used    Tobacco comment: social   Substance Use Topics    Alcohol use: No    Drug use: No        Allergies   Allergen Reactions    Amiodarone Shortness Of Breath     Authoring Clinician or Source System: Donna Bond  Respiratory difficulty    Amoxicillin Anaphylaxis    Beta Adrenergic Blockers Shortness Of Breath    Metoprolol Shortness Of Breath \"Beta Blockers    Penicillin V Potassium Anaphylaxis    Lisinopril Other (See Comments)     Eyes were beating along with her heart beat/HTN    Dronedarone Other (See Comments)     Irregular Heart Beats  Authoring Clinician or Source System: Kyara Schmitt  ([employer])  Edema and irregular heartbeats    Epinephrine Other (See Comments)     dental admin gave her afib      Statins     Morphine Nausea Only, Nausea And Vomiting and Other (See Comments)     Nausea  Other reaction(s): GI Upset  Nausea    Nexium [Esomeprazole Magnesium] Anxiety     Heart flutters more than normal.    Proton Pump Inhibitors Nausea And Vomiting and Anxiety     Other reaction(s): GI Distress, GI Upset  Heart flutters more than normal.   ,   Past Medical History:   Diagnosis Date    Allergic rhinitis     Anxiety     Atrial fibrillation (HCC)     Heart murmur     Valvuloplasty 6/2012    Histoplasmosis     By xray criteria.  History of cardioversion 10 times total: last Dec 2012    Hyperlipidemia     Hypertension     Hypokalemia     Hyponatremia     Impaired fasting glucose     Mass     Benign of neck/chin, status postop,now resolved. 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    Debbie Medrano   ,   Past Surgical History:   Procedure Laterality Date    CARDIAC PACEMAKER PLACEMENT  12/12/2017    dual chamber pacemaker primary implant and primary lead insertion done [facility] Dr Amanda Mendoza  09/19/2019    Median sternotomy, open heart surgery, replacement of the aortic valve (Crooks Inspiris Resilia bovine pericardial valve), mitral valve ([employer]. [employer] Biocor porcine mitral valve) repair of the tricuspid valve (#26 Lilibeth Classic rigid annuloplasty ring) Complete right and left-sided maze procedure and excission of the left atrial appendage   [facility] CARDIAC VALVE SURGERY  June 19,2012 For rheumatic mitral stenosis.  CARDIOVERSION  10 times in past, last 12/12    CYST REMOVAL      Neck    DILATION AND CURETTAGE OF UTERUS  April 13, 1978    Dr. Charley Peterson IR KYPHOPLASTY THORACIC FIRST LEVEL  11/04/2020    T9    KYPHOSIS SURGERY  10/05/2020    OTHER SURGICAL HISTORY  Jan. 2013    Cardiac ablation    PACEMAKER INSERTION Left 12/2017    SALPINGECTOMY Bilateral July 8,1977    Laparoscopic partial(elective sterilization. )The mass was an epidermal inclusion cyst that was ruptured/inflamed.  SPINE SURGERY  05/2021    ablation with Dr Kelly Coats  1/8/2015    cardiac ablation; [facility]; for SVT    WRIST GANGLION EXCISION Right April 24,2002    Alpa Bailey       Physical Exam  Vitals and nursing note reviewed. Constitutional:       General: She is not in acute distress. Appearance: Normal appearance. She is well-developed. She is not diaphoretic. HENT:      Head: Normocephalic and atraumatic. Right Ear: External ear normal.      Left Ear: External ear normal.      Ears:      Comments: TMs dull with fluid behind the TM     Nose: Congestion and rhinorrhea present. Mouth/Throat:      Pharynx: No posterior oropharyngeal erythema. Comments: Post nasal drainage noted  Eyes:      General: No scleral icterus. Right eye: No discharge. Left eye: No discharge. Conjunctiva/sclera: Conjunctivae normal.      Pupils: Pupils are equal, round, and reactive to light. Neck:      Thyroid: No thyromegaly. Cardiovascular:      Rate and Rhythm: Normal rate and regular rhythm. Heart sounds: Normal heart sounds. Pulmonary:      Effort: Pulmonary effort is normal. No respiratory distress. Breath sounds: Normal breath sounds. No wheezing. Musculoskeletal:      Cervical back: Normal range of motion and neck supple. Lymphadenopathy:      Cervical: Cervical adenopathy present.    Skin:     General: Skin is warm and dry.      Findings: No rash. Neurological:      Mental Status: She is alert and oriented to person, place, and time. Psychiatric:         Behavior: Behavior normal.         Thought Content: Thought content normal.         Judgment: Judgment normal.                ASSESSMENT/PLAN:    Encounter Diagnosis   Name Primary?  COVID-19      No orders of the defined types were placed in this encounter. Patient with 2 positive home tests. Will treat with regeneron. Patient aware of risks and benefits of the medication. Patient is to treat symptomatically with increased fluids and rest.    Tylenol 1-2 tabs po q4h prn pain or fever    Return  if no improvement in symptoms or if any further symptoms arise. .    No follow-ups on file. Dolly Orozco is a 80 y.o. female being evaluated by a Virtual Visit (video visit) encounter to address concerns as mentioned above. A caregiver was present when appropriate. Due to this being a TeleHealth encounter (During UYTVB-99 public health emergency), evaluation of the following organ systems was limited: Vitals/Constitutional/EENT/Resp/CV/GI//MS/Neuro/Skin/Heme-Lymph-Imm. Pursuant to the emergency declaration under the [address] [place] [address] authority and the [employer] and [employer] Act, this Virtual Visit was conducted with patient's (and/or legal guardian's) consent, to reduce the patient's risk of exposure to COVID-19 and provide necessary medical care. The patient (and/or legal guardian) has also been advised to contact this office for worsening conditions or problems, and seek emergency medical treatment and/or call 911 if deemed necessary. Patient identification was verified at the start of the visit: Yes    Total time spent on this encounter: Not billed by time    Services were provided through a video synchronous discussion virtually to substitute for in-person clinic visit. Patient was in their home setting on their mobile device and I was in my office on a secured video interface. --Elia Smith,  on 12/22/2021 at 3:40 PM    An electronic signature was used to authenticate this note.

## 2021-12-23 ENCOUNTER — HOSPITAL ENCOUNTER (OUTPATIENT)
Dept: INFUSION THERAPY | Age: 71
Setting detail: INFUSION SERIES
Discharge: HOME | End: 2021-12-23
Payer: MEDICARE

## 2021-12-23 VITALS
TEMPERATURE: 99.8 F | SYSTOLIC BLOOD PRESSURE: 178 MMHG | HEART RATE: 76 BPM | OXYGEN SATURATION: 93 % | DIASTOLIC BLOOD PRESSURE: 84 MMHG | RESPIRATION RATE: 16 BRPM

## 2021-12-23 DIAGNOSIS — U07.1 COVID-19: Primary | ICD-10-CM

## 2021-12-23 PROCEDURE — 6360000002 HC RX W HCPCS: Performed by: FAMILY MEDICINE

## 2021-12-23 PROCEDURE — 2580000003 HC RX 258: Performed by: FAMILY MEDICINE

## 2021-12-23 PROCEDURE — M0243 CASIRIVI AND IMDEVI INFUSION: HCPCS

## 2021-12-23 RX ORDER — ALBUTEROL SULFATE 90 UG/1
4 AEROSOL, METERED RESPIRATORY (INHALATION) PRN
OUTPATIENT
Start: 2021-12-23

## 2021-12-23 RX ORDER — SODIUM CHLORIDE 9 MG/ML
25 INJECTION, SOLUTION INTRAVENOUS PRN
OUTPATIENT
Start: 2021-12-23

## 2021-12-23 RX ORDER — SODIUM CHLORIDE 0.9 % (FLUSH) 0.9 %
5-40 SYRINGE (ML) INJECTION PRN
OUTPATIENT
Start: 2021-12-23

## 2021-12-23 RX ORDER — ONDANSETRON 2 MG/ML
8 INJECTION INTRAMUSCULAR; INTRAVENOUS
OUTPATIENT
Start: 2021-12-23

## 2021-12-23 RX ORDER — SODIUM CHLORIDE 9 MG/ML
INJECTION, SOLUTION INTRAVENOUS CONTINUOUS
OUTPATIENT
Start: 2021-12-23

## 2021-12-23 RX ORDER — EPINEPHRINE 1 MG/ML
0.3 INJECTION, SOLUTION, CONCENTRATE INTRAVENOUS PRN
OUTPATIENT
Start: 2021-12-23

## 2021-12-23 RX ORDER — HEPARIN SODIUM (PORCINE) LOCK FLUSH IV SOLN 100 UNIT/ML 100 UNIT/ML
500 SOLUTION INTRAVENOUS PRN
OUTPATIENT
Start: 2021-12-23

## 2021-12-23 RX ORDER — DIPHENHYDRAMINE HYDROCHLORIDE 50 MG/ML
50 INJECTION INTRAMUSCULAR; INTRAVENOUS
OUTPATIENT
Start: 2021-12-23

## 2021-12-23 RX ORDER — ACETAMINOPHEN 325 MG/1
650 TABLET ORAL
OUTPATIENT
Start: 2021-12-23

## 2021-12-23 RX ADMIN — CASIRIVIMAB AND IMDEVIMAB: 600; 600 INJECTION, SOLUTION, CONCENTRATE INTRAVENOUS at 12:12

## 2022-01-16 DIAGNOSIS — F41.9 ANXIETY: ICD-10-CM

## 2022-01-16 RX ORDER — ALPRAZOLAM 0.25 MG/1
0.25 TABLET ORAL 3 TIMES DAILY PRN
Qty: 90 TABLET | Refills: 2 | Status: CANCELLED | OUTPATIENT
Start: 2022-01-16 | End: 2022-04-16

## 2022-01-17 NOTE — TELEPHONE ENCOUNTER
Ania Mcintyre called requesting a refill of the below medication which has been pended for you: per [facility], last alprazolam fill 12/16/21 #90. Confirmed with Liudmila Nichole at [facility] that patient has 2 refills available. They will get one ready for her. Request removed. Patient notified. Requested Prescriptions     Pending Prescriptions Disp Refills    ALPRAZolam (XANAX) 0.25 MG tablet 90 tablet 2     Sig: Take 1 tablet by mouth 3 times daily as needed for Anxiety for up to 90 days.        Last Appointment Date: 12/22/2021  Next Appointment Date: 6/16/2022    Allergies   Allergen Reactions    Amiodarone Shortness Of Breath     Authoring Clinician or Source System: Abel Brady  Respiratory difficulty    Amoxicillin Anaphylaxis    Beta Adrenergic Blockers Shortness Of Breath    Metoprolol Shortness Of Breath     \"Beta Blockers    Penicillin V Potassium Anaphylaxis    Lisinopril Other (See Comments)     Eyes were beating along with her heart beat/HTN    Dronedarone Other (See Comments)     Irregular Heart Beats  Authoring Clinician or Source System: Kyara Schmitt  ([employer])  Edema and irregular heartbeats    Epinephrine Other (See Comments)     dental admin gave her afib      Statins     Morphine Nausea Only, Nausea And Vomiting and Other (See Comments)     Nausea  Other reaction(s): GI Upset  Nausea    Nexium [Esomeprazole Magnesium] Anxiety     Heart flutters more than normal.    Proton Pump Inhibitors Nausea And Vomiting and Anxiety     Other reaction(s): GI Distress, GI Upset  Heart flutters more than normal.

## 2022-02-18 ENCOUNTER — PATIENT MESSAGE (OUTPATIENT)
Dept: FAMILY MEDICINE CLINIC | Age: 72
End: 2022-02-18

## 2022-02-18 NOTE — TELEPHONE ENCOUNTER
From: Shant Mariee  To: Dr. Hamlet Matthews: 2/18/2022 1:34 PM EST  Subject: Lasix refill    [facility] Dr. Mony Ortega,  I need a refill for 20mg Lasix sent to [facility], 3 months if possible.   Thank you,   Katelyn Patel

## 2022-02-21 RX ORDER — FUROSEMIDE 20 MG/1
TABLET ORAL
Qty: 90 TABLET | Refills: 0 | Status: SHIPPED | OUTPATIENT
Start: 2022-02-21

## 2022-02-21 NOTE — TELEPHONE ENCOUNTER
Debbie Cloud called requesting a refill of the below medication which has been pended for you:     Requested Prescriptions     Pending Prescriptions Disp Refills    furosemide (LASIX) 20 MG tablet 90 tablet 0     Sig: take 1 tablet by mouth as needed for weight gain of 3-4 pounds overnight       Last Appointment Date: 12/22/2021  Next Appointment Date: 6/16/2022    Allergies   Allergen Reactions    Amiodarone Shortness Of Breath     Authoring Clinician or Source System: Yunior Ohs  Respiratory difficulty    Amoxicillin Anaphylaxis    Beta Adrenergic Blockers Shortness Of Breath    Metoprolol Shortness Of Breath     \"Beta Blockers    Penicillin V Potassium Anaphylaxis    Lisinopril Other (See Comments)     Eyes were beating along with her heart beat/HTN    Dronedarone Other (See Comments)     Irregular Heart Beats  Authoring Clinician or Source System: Kyara Schmitt  ([employer])  Edema and irregular heartbeats    Epinephrine Other (See Comments)     dental admin gave her afib      Statins     Morphine Nausea Only, Nausea And Vomiting and Other (See Comments)     Nausea  Other reaction(s): GI Upset  Nausea    Nexium [Esomeprazole Magnesium] Anxiety     Heart flutters more than normal.    Proton Pump Inhibitors Nausea And Vomiting and Anxiety     Other reaction(s): GI Distress, GI Upset  Heart flutters more than normal.

## 2022-03-17 DIAGNOSIS — F41.9 ANXIETY: ICD-10-CM

## 2022-03-18 ENCOUNTER — PATIENT MESSAGE (OUTPATIENT)
Dept: FAMILY MEDICINE CLINIC | Age: 72
End: 2022-03-18

## 2022-03-18 RX ORDER — ALPRAZOLAM 0.25 MG/1
TABLET ORAL
Qty: 90 TABLET | Refills: 2 | Status: SHIPPED | OUTPATIENT
Start: 2022-03-18 | End: 2022-06-14 | Stop reason: SDUPTHER

## 2022-03-18 RX ORDER — POTASSIUM CHLORIDE 750 MG/1
10 CAPSULE, EXTENDED RELEASE ORAL DAILY PRN
Qty: 90 CAPSULE | Refills: 0 | Status: SHIPPED | OUTPATIENT
Start: 2022-03-18 | End: 2022-06-28

## 2022-03-18 NOTE — TELEPHONE ENCOUNTER
Sonja Casey called requesting a refill of the below medication which has been pended for you:     Requested Prescriptions     Pending Prescriptions Disp Refills    potassium chloride (MICRO-K) 10 MEQ extended release capsule 90 capsule 0     Sig: Take 1 capsule by mouth daily as needed (if she takes Lasix)       Last Appointment Date: 12/22/2021  Next Appointment Date: 6/16/2022    Allergies   Allergen Reactions    Amiodarone Shortness Of Breath     Authoring Clinician or Source System: Rosangela Chino  Respiratory difficulty    Amoxicillin Anaphylaxis    Beta Adrenergic Blockers Shortness Of Breath    Metoprolol Shortness Of Breath     \"Beta Blockers    Penicillin V Potassium Anaphylaxis    Lisinopril Other (See Comments)     Eyes were beating along with her heart beat/HTN    Dronedarone Other (See Comments)     Irregular Heart Beats  Authoring Clinician or Source System: Kyara Schmitt  ([employer])  Edema and irregular heartbeats    Epinephrine Other (See Comments)     dental admin gave her afib      Statins     Morphine Nausea Only, Nausea And Vomiting and Other (See Comments)     Nausea  Other reaction(s): GI Upset  Nausea    Nexium [Esomeprazole Magnesium] Anxiety     Heart flutters more than normal.    Proton Pump Inhibitors Nausea And Vomiting and Anxiety     Other reaction(s): GI Distress, GI Upset  Heart flutters more than normal.

## 2022-03-18 NOTE — TELEPHONE ENCOUNTER
From: Kerrie Frausto  To: Dr. León Eagle: 3/18/2022 12:00 PM EDT  Subject: Potassium 10mg refill    I need my potassium refilled, 10mg, at [place], thank you.

## 2022-06-07 ENCOUNTER — HOSPITAL ENCOUNTER (OUTPATIENT)
Dept: LAB | Age: 72
Discharge: HOME | End: 2022-06-07
Payer: MEDICARE

## 2022-06-07 DIAGNOSIS — R73.01 IMPAIRED FASTING GLUCOSE: ICD-10-CM

## 2022-06-07 DIAGNOSIS — E78.2 MIXED HYPERLIPIDEMIA: ICD-10-CM

## 2022-06-07 DIAGNOSIS — E83.42 HYPOMAGNESEMIA: ICD-10-CM

## 2022-06-07 DIAGNOSIS — E55.9 VITAMIN D DEFICIENCY: ICD-10-CM

## 2022-06-07 DIAGNOSIS — I10 ESSENTIAL HYPERTENSION: ICD-10-CM

## 2022-06-07 LAB
ABSOLUTE EOS #: 0.08 K/UL (ref 0–0.44)
ABSOLUTE IMMATURE GRANULOCYTE: 0.03 K/UL (ref 0–0.3)
ABSOLUTE LYMPH #: 1.3 K/UL (ref 1.1–3.7)
ABSOLUTE MONO #: 0.63 K/UL (ref 0.1–1.2)
ALBUMIN SERPL-MCNC: 4.7 G/DL (ref 3.5–5.2)
ALBUMIN/GLOBULIN RATIO: 1.5 (ref 1–2.5)
ALP BLD-CCNC: 81 U/L (ref 35–104)
ALT SERPL-CCNC: 13 U/L (ref 5–33)
ANION GAP SERPL CALCULATED.3IONS-SCNC: 9 MMOL/L (ref 9–17)
AST SERPL-CCNC: 19 U/L
BASOPHILS # BLD: 1 % (ref 0–2)
BASOPHILS ABSOLUTE: 0.04 K/UL (ref 0–0.2)
BILIRUB SERPL-MCNC: 0.38 MG/DL (ref 0.3–1.2)
BUN BLDV-MCNC: 14 MG/DL (ref 8–23)
BUN/CREAT BLD: 25 (ref 9–20)
CALCIUM SERPL-MCNC: 9.6 MG/DL (ref 8.6–10.4)
CHLORIDE BLD-SCNC: 98 MMOL/L (ref 98–107)
CHOLESTEROL/HDL RATIO: 3.3
CHOLESTEROL: 177 MG/DL
CO2: 30 MMOL/L (ref 20–31)
CREAT SERPL-MCNC: 0.57 MG/DL (ref 0.5–0.9)
EOSINOPHILS RELATIVE PERCENT: 1 % (ref 1–4)
ESTIMATED AVERAGE GLUCOSE: 123 MG/DL
GFR AFRICAN AMERICAN: >60 ML/MIN
GFR NON-AFRICAN AMERICAN: >60 ML/MIN
GFR SERPL CREATININE-BSD FRML MDRD: ABNORMAL ML/MIN/{1.73_M2}
GLUCOSE BLD-MCNC: 100 MG/DL (ref 70–99)
HBA1C MFR BLD: 5.9 % (ref 4–6)
HCT VFR BLD CALC: 40.2 % (ref 36.3–47.1)
HDLC SERPL-MCNC: 53 MG/DL
HEMOGLOBIN: 13.6 G/DL (ref 11.9–15.1)
IMMATURE GRANULOCYTES: 1 %
LDL CHOLESTEROL: 105 MG/DL (ref 0–130)
LYMPHOCYTES # BLD: 20 % (ref 24–43)
MAGNESIUM: 2.2 MG/DL (ref 1.6–2.6)
MCH RBC QN AUTO: 32.2 PG (ref 25.2–33.5)
MCHC RBC AUTO-ENTMCNC: 33.8 G/DL (ref 25.2–33.5)
MCV RBC AUTO: 95 FL (ref 82.6–102.9)
MONOCYTES # BLD: 10 % (ref 3–12)
NRBC AUTOMATED: 0 PER 100 WBC
PDW BLD-RTO: 11.9 % (ref 11.8–14.4)
PLATELET # BLD: 269 K/UL (ref 138–453)
PMV BLD AUTO: 9 FL (ref 8.1–13.5)
POTASSIUM SERPL-SCNC: 4.5 MMOL/L (ref 3.7–5.3)
RBC # BLD: 4.23 M/UL (ref 3.95–5.11)
SEG NEUTROPHILS: 67 % (ref 36–65)
SEGMENTED NEUTROPHILS ABSOLUTE COUNT: 4.36 K/UL (ref 1.5–8.1)
SODIUM BLD-SCNC: 137 MMOL/L (ref 135–144)
TOTAL PROTEIN: 7.8 G/DL (ref 6.4–8.3)
TRIGL SERPL-MCNC: 97 MG/DL
TSH SERPL DL<=0.05 MIU/L-ACNC: 1.77 UIU/ML (ref 0.3–5)
VITAMIN D 25-HYDROXY: 24.2 NG/ML
WBC # BLD: 6.4 K/UL (ref 3.5–11.3)

## 2022-06-07 PROCEDURE — 83735 ASSAY OF MAGNESIUM: CPT

## 2022-06-07 PROCEDURE — 85025 COMPLETE CBC W/AUTO DIFF WBC: CPT

## 2022-06-07 PROCEDURE — 80053 COMPREHEN METABOLIC PANEL: CPT

## 2022-06-07 PROCEDURE — 36415 COLL VENOUS BLD VENIPUNCTURE: CPT

## 2022-06-07 PROCEDURE — 80061 LIPID PANEL: CPT

## 2022-06-07 PROCEDURE — 82306 VITAMIN D 25 HYDROXY: CPT

## 2022-06-07 PROCEDURE — 83036 HEMOGLOBIN GLYCOSYLATED A1C: CPT

## 2022-06-07 PROCEDURE — 84443 ASSAY THYROID STIM HORMONE: CPT

## 2022-06-14 ENCOUNTER — OFFICE VISIT (OUTPATIENT)
Dept: FAMILY MEDICINE CLINIC | Age: 72
End: 2022-06-14
Payer: MEDICARE

## 2022-06-14 VITALS
HEART RATE: 68 BPM | DIASTOLIC BLOOD PRESSURE: 76 MMHG | RESPIRATION RATE: 18 BRPM | BODY MASS INDEX: 20.2 KG/M2 | WEIGHT: 109.8 LBS | SYSTOLIC BLOOD PRESSURE: 150 MMHG | OXYGEN SATURATION: 98 % | TEMPERATURE: 98 F | HEIGHT: 62 IN

## 2022-06-14 DIAGNOSIS — F41.9 ANXIETY: ICD-10-CM

## 2022-06-14 DIAGNOSIS — I48.0 PAROXYSMAL ATRIAL FIBRILLATION (HCC): ICD-10-CM

## 2022-06-14 DIAGNOSIS — J84.10 LUNG GRANULOMA (HCC): ICD-10-CM

## 2022-06-14 DIAGNOSIS — I10 ESSENTIAL HYPERTENSION: Primary | ICD-10-CM

## 2022-06-14 DIAGNOSIS — E83.42 HYPOMAGNESEMIA: ICD-10-CM

## 2022-06-14 DIAGNOSIS — R73.01 IMPAIRED FASTING GLUCOSE: ICD-10-CM

## 2022-06-14 DIAGNOSIS — E55.9 VITAMIN D DEFICIENCY: ICD-10-CM

## 2022-06-14 DIAGNOSIS — E78.2 MIXED HYPERLIPIDEMIA: ICD-10-CM

## 2022-06-14 PROCEDURE — 1123F ACP DISCUSS/DSCN MKR DOCD: CPT | Performed by: FAMILY MEDICINE

## 2022-06-14 PROCEDURE — G8420 CALC BMI NORM PARAMETERS: HCPCS | Performed by: FAMILY MEDICINE

## 2022-06-14 PROCEDURE — G8427 DOCREV CUR MEDS BY ELIG CLIN: HCPCS | Performed by: FAMILY MEDICINE

## 2022-06-14 PROCEDURE — 1090F PRES/ABSN URINE INCON ASSESS: CPT | Performed by: FAMILY MEDICINE

## 2022-06-14 PROCEDURE — 99214 OFFICE O/P EST MOD 30 MIN: CPT | Performed by: FAMILY MEDICINE

## 2022-06-14 PROCEDURE — G8399 PT W/DXA RESULTS DOCUMENT: HCPCS | Performed by: FAMILY MEDICINE

## 2022-06-14 PROCEDURE — 3017F COLORECTAL CA SCREEN DOC REV: CPT | Performed by: FAMILY MEDICINE

## 2022-06-14 PROCEDURE — 99212 OFFICE O/P EST SF 10 MIN: CPT | Performed by: FAMILY MEDICINE

## 2022-06-14 PROCEDURE — 1036F TOBACCO NON-USER: CPT | Performed by: FAMILY MEDICINE

## 2022-06-14 RX ORDER — ALPRAZOLAM 0.25 MG/1
TABLET ORAL
Qty: 90 TABLET | Refills: 2 | Status: SHIPPED | OUTPATIENT
Start: 2022-06-14 | End: 2022-09-20

## 2022-06-14 ASSESSMENT — ENCOUNTER SYMPTOMS
NAUSEA: 0
TROUBLE SWALLOWING: 0
VOMITING: 0
ABDOMINAL PAIN: 0
COUGH: 0
WHEEZING: 0
SORE THROAT: 0
SHORTNESS OF BREATH: 0
EYE DISCHARGE: 0
SINUS PRESSURE: 0
DIARRHEA: 0
EYE REDNESS: 0
CONSTIPATION: 0
RHINORRHEA: 0

## 2022-06-14 ASSESSMENT — PATIENT HEALTH QUESTIONNAIRE - PHQ9
2. FEELING DOWN, DEPRESSED OR HOPELESS: 0
SUM OF ALL RESPONSES TO PHQ QUESTIONS 1-9: 0
1. LITTLE INTEREST OR PLEASURE IN DOING THINGS: 0
SUM OF ALL RESPONSES TO PHQ9 QUESTIONS 1 & 2: 0

## 2022-06-14 NOTE — PROGRESS NOTES
Patient declines tdap, shingles, and pneumonia vaccines. Declines colon and mammogram screening at this time. Declines to schedule AWV at this time.

## 2022-06-14 NOTE — PROGRESS NOTES
2022     Yuki Mcgraw (:  1950) is a 71 y.o. female, here for evaluation of the following medical concerns:    HPI  Patient comes in today for follow up of chronic health issues Patient seems to be doing well overall.  passed away recently but she seems to be coping reasonably well. She does note that he had been ill for several years or so she is at peace with the fact that now he is in a better place. Has not really seem to affect her anxiety overall. She does have a known history of hypertension and her blood pressure is little bit elevated today. She does monitor this at home and at home it runs relatively normal so would have her continue to monitor and she does follow-up with cardiology relatively consistently. Has a known history of hyperlipidemia and her cholesterol levels are stable at this time with her current dietary intake and supplements. Has a known history of impaired fasting glucose her blood sugar levels are stable and controlled with dietary efforts. Does have a known history of hypomagnesemia and her magnesium level is adequately supplemented at this time. Does have a known history of vitamin D deficiency and she cannot take vitamin D supplementation as it does cause palpitations. Does have a known history of paroxysmal atrial fibrillation and intermittently will have some fluttering but does not have consistent symptoms. Is relatively stable. Had a history of a lung granuloma but no respiratory symptoms or concerns regarding this issue. Patient otherwise has no other acute medical concerns. .  Patient's recent lab reports are as follows:    Results for orders placed or performed during the hospital encounter of 22   TSH without Reflex   Result Value Ref Range    TSH 1.77 0.30 - 5.00 uIU/mL   Vitamin D 25 Hydroxy   Result Value Ref Range    Vit D, 25-Hydroxy 24.2 (L) >29.9 ng/mL   Magnesium   Result Value Ref Range    Magnesium 2.2 1.6 - 2.6 mg/dL Lipid Panel   Result Value Ref Range    Cholesterol 177 <200 mg/dL    HDL 53 >40 mg/dL    LDL Cholesterol 105 0 - 130 mg/dL    Chol/HDL Ratio 3.3 <5    Triglycerides 97 <150 mg/dL   Hemoglobin A1C   Result Value Ref Range    Hemoglobin A1C 5.9 4.0 - 6.0 %    Estimated Avg Glucose 123 mg/dL   Comprehensive Metabolic Panel   Result Value Ref Range    Glucose 100 (H) 70 - 99 mg/dL    BUN 14 8 - 23 mg/dL    CREATININE 0.57 0.50 - 0.90 mg/dL    Bun/Cre Ratio 25 (H) 9 - 20    Calcium 9.6 8.6 - 10.4 mg/dL    Sodium 137 135 - 144 mmol/L    Potassium 4.5 3.7 - 5.3 mmol/L    Chloride 98 98 - 107 mmol/L    CO2 30 20 - 31 mmol/L    Anion Gap 9 9 - 17 mmol/L    Alkaline Phosphatase 81 35 - 104 U/L    ALT 13 5 - 33 U/L    AST 19 <32 U/L    Total Bilirubin 0.38 0.3 - 1.2 mg/dL    Total Protein 7.8 6.4 - 8.3 g/dL    Albumin 4.7 3.5 - 5.2 g/dL    Albumin/Globulin Ratio 1.5 1.0 - 2.5    GFR Non-African American >60 >60 mL/min    GFR African American >60 >60 mL/min    GFR Comment         CBC Auto Differential   Result Value Ref Range    WBC 6.4 3.5 - 11.3 k/uL    RBC 4.23 3.95 - 5.11 m/uL    Hemoglobin 13.6 11.9 - 15.1 g/dL    Hematocrit 40.2 36.3 - 47.1 %    MCV 95.0 82.6 - 102.9 fL    MCH 32.2 25.2 - 33.5 pg    MCHC 33.8 (H) 25.2 - 33.5 g/dL    RDW 11.9 11.8 - 14.4 %    Platelets 908 888 - 609 k/uL    MPV 9.0 8.1 - 13.5 fL    NRBC Automated 0.0 0.0 per 100 WBC    Seg Neutrophils 67 (H) 36 - 65 %    Lymphocytes 20 (L) 24 - 43 %    Monocytes 10 3 - 12 %    Eosinophils % 1 1 - 4 %    Basophils 1 0 - 2 %    Immature Granulocytes 1 (H) 0 %    Segs Absolute 4.36 1.50 - 8.10 k/uL    Absolute Lymph # 1.30 1.10 - 3.70 k/uL    Absolute Mono # 0.63 0.10 - 1.20 k/uL    Absolute Eos # 0.08 0.00 - 0.44 k/uL    Basophils Absolute 0.04 0.00 - 0.20 k/uL    Absolute Immature Granulocyte 0.03 0.00 - 0.30 k/uL      Other review of systems are as noted below. Preventative measures are reviewed today.  See health maintenance section for complete preventative plan of care. Did review patient's med list, allergies, social history, fam history, pmhx and pshx today as noted in the record. Review of Systems   Constitutional: Negative for chills, fatigue and fever. HENT: Negative for congestion, ear pain, postnasal drip, rhinorrhea, sinus pressure, sore throat and trouble swallowing. Eyes: Negative for discharge and redness. Respiratory: Negative for cough, shortness of breath and wheezing. Cardiovascular: Negative for chest pain. Gastrointestinal: Negative for abdominal pain, constipation, diarrhea, nausea and vomiting. Genitourinary: Negative for dysuria, flank pain, frequency and urgency. Musculoskeletal: Negative for arthralgias, myalgias and neck pain. Skin: Negative for rash and wound. Allergic/Immunologic: Negative for environmental allergies. Neurological: Negative for dizziness, weakness, light-headedness and headaches. Hematological: Negative for adenopathy. Psychiatric/Behavioral: Negative. Prior to Visit Medications    Medication Sig Taking? Authorizing Provider   ALPRAZolam (XANAX) 0.25 MG tablet TAKE 1 TABLET BY MOUTH THREE TIMES A DAY AS NEEDED FOR ANXIETY  Courtney Mann DO   potassium chloride (MICRO-K) 10 MEQ extended release capsule Take 1 capsule by mouth daily as needed (if she takes Lasix)  Courtney Mann DO   furosemide (LASIX) 20 MG tablet take 1 tablet by mouth as needed for weight gain of 3-4 pounds overnight  Luz Maria Purvis DO   lidocaine (LIDODERM) 5 % Place 1 patch onto the skin daily 12 hours on, 12 hours off.   Courtney Mann DO   dilTIAZem [place] [facility]) 240 MG extended release capsule Take 240 mg by mouth daily  Historical Provider, MD   aspirin 81 MG chewable tablet Take 81 mg by mouth daily  Historical Provider, MD   valACYclovir (VALTREX) 500 MG tablet 1 tab bid  Luz Maria Purvis DO   dilTIAZem (CARDIZEM) 60 MG tablet TAKE 1 TABLET BY MOUTH UP TO THREE TIMES DAILY AS NEEDED FOR A-FIB  Emma Bell, DO   famotidine (PEPCID) 20 MG tablet Take 1 tablet by mouth 2 times daily  Luz Maria Shields, DO   melatonin 3 MG TABS tablet Take 3 mg by mouth  Historical Provider, MD   Plant Sterols and Stanols (CHOLESTOFF PO) Take 1,800 mg by mouth daily  Historical Provider, MD        Social History     Tobacco Use    Smoking status: Former Smoker     Packs/day: 0.25     Years: 20.00     Pack years: 5.00     Types: Cigarettes     Quit date: 5/15/2009     Years since quittin.0    Smokeless tobacco: Never Used    Tobacco comment: social   Substance Use Topics    Alcohol use: No        There were no vitals filed for this visit. Estimated body mass index is 21.58 kg/m² as calculated from the following:    Height as of 21: 5' 2\" (1.575 m). Weight as of 21: 118 lb (53.5 kg). Physical Exam  Vitals and nursing note reviewed. Constitutional:       General: She is not in acute distress. Appearance: Normal appearance. She is well-developed. She is not diaphoretic. HENT:      Head: Normocephalic and atraumatic. Right Ear: Tympanic membrane, ear canal and external ear normal.      Left Ear: Tympanic membrane, ear canal and external ear normal.      Nose: Nose normal.      Mouth/Throat:      Mouth: Mucous membranes are moist.      Pharynx: Oropharynx is clear. No oropharyngeal exudate. Eyes:      General:         Right eye: No discharge. Left eye: No discharge. Conjunctiva/sclera: Conjunctivae normal.      Pupils: Pupils are equal, round, and reactive to light. Neck:      Thyroid: No thyromegaly. Cardiovascular:      Rate and Rhythm: Normal rate and regular rhythm. Heart sounds: Normal heart sounds. Pulmonary:      Effort: Pulmonary effort is normal.      Breath sounds: Normal breath sounds. No wheezing or rales. Abdominal:      General: Bowel sounds are normal. There is no distension. Palpations: Abdomen is soft. Tenderness:  There is no abdominal tenderness. Musculoskeletal:      Cervical back: Normal range of motion and neck supple. Lymphadenopathy:      Cervical: No cervical adenopathy. Skin:     General: Skin is warm and dry. Findings: No rash. Neurological:      Mental Status: She is alert and oriented to person, place, and time. Psychiatric:         Behavior: Behavior normal.         Thought Content: Thought content normal.         Judgment: Judgment normal.           ASSESSMENT/PLAN:  Encounter Diagnoses   Name Primary?     Essential hypertension Yes    Mixed hyperlipidemia     Impaired fasting glucose     Hypomagnesemia     Vitamin D deficiency     Paroxysmal atrial fibrillation (HCC)     Anxiety     Lung granuloma (HCC)      Orders Placed This Encounter   Medications    ALPRAZolam (XANAX) 0.25 MG tablet     Sig: TAKE 1 TABLET BY MOUTH THREE TIMES A DAY AS NEEDED FOR ANXIETY     Dispense:  90 tablet     Refill:  2     Orders Placed This Encounter   Procedures    CBC with Auto Differential     Standing Status:   Future     Standing Expiration Date:   6/14/2023    Comprehensive Metabolic Panel     Standing Status:   Future     Standing Expiration Date:   6/14/2023    Hemoglobin A1C     Standing Status:   Future     Standing Expiration Date:   6/14/2023    Lipid Panel     Standing Status:   Future     Standing Expiration Date:   6/14/2023     Order Specific Question:   Is Patient Fasting?/# of Hours     Answer:   12 hours    Magnesium     Standing Status:   Future     Standing Expiration Date:   6/14/2023    Vitamin D 25 Hydroxy     Standing Status:   Future     Standing Expiration Date:   6/14/2023    TSH     Standing Status:   Future     Standing Expiration Date:   6/14/2023     Controlled Substance Monitoring:    Acute and Chronic Pain Monitoring:   RX Monitoring 6/14/2022   Attestation -   Periodic Controlled Substance Monitoring Possible medication side effects, risk of tolerance/dependence & alternative treatments discussed. ;No signs of potential drug abuse or diversion identified. ;Assessed functional status. Chronic Pain > 80 MEDD -   Chronic Pain > 120 MEDD -       Patient is to continue on her current medical regimen. No changes are made at this time. Patient is to continue to follow a low-carb/low sugar/low-fat diet and increase exercise for optimal blood sugar and cholesterol control. Patient is to return to my office in 6 months duration or sooner if any further problems or symptoms arise. (Please note that portions of this note were completed with a voice recognition program. Efforts were made to edit the dictations but occasionally words are mis-transcribed.)        No follow-ups on file. An electronic signature was used to authenticate this note.     --Aliya Rodriguez, DO on 6/14/2022 at 7:22 AM

## 2022-06-28 RX ORDER — POTASSIUM CHLORIDE 750 MG/1
CAPSULE, EXTENDED RELEASE ORAL
Qty: 90 CAPSULE | Refills: 0 | Status: SHIPPED | OUTPATIENT
Start: 2022-06-28 | End: 2022-10-14

## 2022-06-28 NOTE — TELEPHONE ENCOUNTER
Gary Ng called requesting a refill of the below medication which has been pended for you:     Requested Prescriptions     Pending Prescriptions Disp Refills    potassium chloride (MICRO-K) 10 MEQ extended release capsule [Pharmacy Med Name: Potassium Chloride ER Oral Capsule Extended Release 10 MEQ] 90 capsule 0     Sig: TAKE 1 CAPSULE BY MOUTH EVERY DAY AS NEEDED IF TAKING LASIX       Last Appointment Date: 6/14/2022  Next Appointment Date: 12/14/2022    Allergies   Allergen Reactions    Amiodarone Shortness Of Breath     Authoring Clinician or Source System: Rahul Mae  Respiratory difficulty    Amoxicillin Anaphylaxis    Beta Adrenergic Blockers Shortness Of Breath    Metoprolol Shortness Of Breath     \"Beta Blockers    Penicillin V Potassium Anaphylaxis    Lisinopril Other (See Comments)     Eyes were beating along with her heart beat/HTN    Dronedarone Other (See Comments)     Irregular Heart Beats  Authoring Clinician or Source System: Kyara Schmitt  ([employer])  Edema and irregular heartbeats    Epinephrine Other (See Comments)     dental admin gave her afib      Statins     Morphine Nausea Only, Nausea And Vomiting and Other (See Comments)     Nausea  Other reaction(s): GI Upset  Nausea    Nexium [Esomeprazole Magnesium] Anxiety     Heart flutters more than normal.    Proton Pump Inhibitors Nausea And Vomiting and Anxiety     Other reaction(s): GI Distress, GI Upset  Heart flutters more than normal.

## 2022-09-19 DIAGNOSIS — F41.9 ANXIETY: ICD-10-CM

## 2022-09-20 RX ORDER — ALPRAZOLAM 0.25 MG/1
TABLET ORAL
Qty: 90 TABLET | Refills: 0 | Status: SHIPPED | OUTPATIENT
Start: 2022-09-20 | End: 2022-10-24

## 2022-09-20 NOTE — TELEPHONE ENCOUNTER
Cynthia Moralez called requesting a refill of the below medication which has been pended for you: per [facility], last alprazolam fill 8/19/22 #90    Requested Prescriptions     Pending Prescriptions Disp Refills    ALPRAZolam (XANAX) 0.25 MG tablet [Pharmacy Med Name: ALPRAZolam Oral Tablet 0.25 MG] 90 tablet 0     Sig: TAKE 1 TABLET BY MOUTH THREE TIMES A DAY AS NEEDED FOR ANXIETY       Last Appointment Date: 6/14/2022  Next Appointment Date: 12/14/2022    Allergies   Allergen Reactions    Amiodarone Shortness Of Breath     Authoring Clinician or Source System: Porsha Steward  Respiratory difficulty    Amoxicillin Anaphylaxis    Beta Adrenergic Blockers Shortness Of Breath    Metoprolol Shortness Of Breath     \"Beta Blockers    Penicillin V Potassium Anaphylaxis    Lisinopril Other (See Comments)     Eyes were beating along with her heart beat/HTN    Dronedarone Other (See Comments)     Irregular Heart Beats  Authoring Clinician or Source System: Kyara Schmitt  ([employer])  Edema and irregular heartbeats    Epinephrine Other (See Comments)     dental admin gave her afib      Statins     Morphine Nausea Only, Nausea And Vomiting and Other (See Comments)     Nausea  Other reaction(s): GI Upset  Nausea    Nexium [Esomeprazole Magnesium] Anxiety     Heart flutters more than normal.    Proton Pump Inhibitors Nausea And Vomiting and Anxiety     Other reaction(s): GI Distress, GI Upset  Heart flutters more than normal.

## 2022-10-07 ENCOUNTER — E-VISIT (OUTPATIENT)
Dept: FAMILY MEDICINE CLINIC | Age: 72
End: 2022-10-07
Payer: MEDICARE

## 2022-10-07 DIAGNOSIS — J01.40 ACUTE NON-RECURRENT PANSINUSITIS: Primary | ICD-10-CM

## 2022-10-07 PROCEDURE — 99422 OL DIG E/M SVC 11-20 MIN: CPT | Performed by: FAMILY MEDICINE

## 2022-10-07 RX ORDER — CEPHALEXIN 500 MG/1
500 CAPSULE ORAL 3 TIMES DAILY
Qty: 30 CAPSULE | Refills: 0 | Status: SHIPPED | OUTPATIENT
Start: 2022-10-07

## 2022-10-07 ASSESSMENT — LIFESTYLE VARIABLES: SMOKING_STATUS: NO, I'VE NEVER SMOKED

## 2022-10-07 NOTE — PROGRESS NOTES
Patient with symptoms of acute sinusitis. Will treat with keflex as ordered and recommended increased water intake. If no improvement advised to follow up in the office. 11-20 minutes were spent on the digital evaluation and management of this patient.

## 2022-10-14 RX ORDER — POTASSIUM CHLORIDE 750 MG/1
CAPSULE, EXTENDED RELEASE ORAL
Qty: 90 CAPSULE | Refills: 0 | Status: SHIPPED | OUTPATIENT
Start: 2022-10-14

## 2022-10-14 NOTE — TELEPHONE ENCOUNTER
Jyotsna Rowe called requesting a refill of the below medication which has been pended for you:     Requested Prescriptions     Pending Prescriptions Disp Refills    potassium chloride (MICRO-K) 10 MEQ extended release capsule [Pharmacy Med Name: Potassium Chloride ER Oral Capsule Extended Release 10 MEQ] 90 capsule 0     Sig: TAKE 1 CAPSULE BY MOUTH EVERY DAY AS NEEDED IF TAKING LASIX       Last Appointment Date: 10/7/2022  Next Appointment Date: 12/14/2022    Allergies   Allergen Reactions    Amiodarone Shortness Of Breath     Authoring Clinician or Source System: Lindsey Holcomb  Respiratory difficulty    Amoxicillin Anaphylaxis    Beta Adrenergic Blockers Shortness Of Breath    Metoprolol Shortness Of Breath     \"Beta Blockers    Penicillin V Potassium Anaphylaxis    Lisinopril Other (See Comments)     Eyes were beating along with her heart beat/HTN    Dronedarone Other (See Comments)     Irregular Heart Beats  Authoring Clinician or Source System: Kyara Schmitt  ([employer])  Edema and irregular heartbeats    Epinephrine Other (See Comments)     dental admin gave her afib      Statins     Morphine Nausea Only, Nausea And Vomiting and Other (See Comments)     Nausea  Other reaction(s): GI Upset  Nausea    Nexium [Esomeprazole Magnesium] Anxiety     Heart flutters more than normal.    Proton Pump Inhibitors Nausea And Vomiting and Anxiety     Other reaction(s): GI Distress, GI Upset  Heart flutters more than normal.

## 2022-10-22 DIAGNOSIS — F41.9 ANXIETY: ICD-10-CM

## 2022-10-24 RX ORDER — ALPRAZOLAM 0.25 MG/1
0.25 TABLET ORAL 3 TIMES DAILY PRN
Qty: 90 TABLET | Refills: 2 | Status: SHIPPED | OUTPATIENT
Start: 2022-10-24 | End: 2023-01-22

## 2022-10-24 NOTE — TELEPHONE ENCOUNTER
Richie Moore called requesting a refill of the below medication which has been pended for you: last filled 9/20/2022 #90    Requested Prescriptions     Pending Prescriptions Disp Refills    ALPRAZolam (XANAX) 0.25 MG tablet [Pharmacy Med Name: ALPRAZolam Oral Tablet 0.25 MG] 90 tablet 0     Sig: TAKE 1 TABLET BY MOUTH THREE TIMES A DAY AS NEEDED FOR ANXIETY       Last Appointment Date: 10/7/2022  Next Appointment Date: 12/14/2022    Allergies   Allergen Reactions    Amiodarone Shortness Of Breath     Authoring Clinician or Source System: Luis Enrique Nine  Respiratory difficulty    Amoxicillin Anaphylaxis    Beta Adrenergic Blockers Shortness Of Breath    Metoprolol Shortness Of Breath     \"Beta Blockers    Penicillin V Potassium Anaphylaxis    Lisinopril Other (See Comments)     Eyes were beating along with her heart beat/HTN    Dronedarone Other (See Comments)     Irregular Heart Beats  Authoring Clinician or Source System: Kyara Schmitt  ([employer])  Edema and irregular heartbeats    Epinephrine Other (See Comments)     dental admin gave her afib      Statins     Morphine Nausea Only, Nausea And Vomiting and Other (See Comments)     Nausea  Other reaction(s): GI Upset  Nausea    Nexium [Esomeprazole Magnesium] Anxiety     Heart flutters more than normal.    Proton Pump Inhibitors Nausea And Vomiting and Anxiety     Other reaction(s): GI Distress, GI Upset  Heart flutters more than normal.

## 2022-12-13 ENCOUNTER — HOSPITAL ENCOUNTER (OUTPATIENT)
Age: 72
Discharge: HOME | End: 2022-12-13
Payer: MEDICARE

## 2022-12-13 DIAGNOSIS — R73.01 IMPAIRED FASTING GLUCOSE: ICD-10-CM

## 2022-12-13 DIAGNOSIS — I10 ESSENTIAL HYPERTENSION: ICD-10-CM

## 2022-12-13 DIAGNOSIS — E78.2 MIXED HYPERLIPIDEMIA: ICD-10-CM

## 2022-12-13 DIAGNOSIS — E55.9 VITAMIN D DEFICIENCY: ICD-10-CM

## 2022-12-13 DIAGNOSIS — E83.42 HYPOMAGNESEMIA: ICD-10-CM

## 2022-12-13 LAB
ABSOLUTE EOS #: 0.09 K/UL (ref 0–0.44)
ABSOLUTE IMMATURE GRANULOCYTE: 0.03 K/UL (ref 0–0.3)
ABSOLUTE LYMPH #: 1.53 K/UL (ref 1.1–3.7)
ABSOLUTE MONO #: 0.76 K/UL (ref 0.1–1.2)
ALBUMIN SERPL-MCNC: 4.5 G/DL (ref 3.5–5.2)
ALBUMIN/GLOBULIN RATIO: 1.5 (ref 1–2.5)
ALP BLD-CCNC: 82 U/L (ref 35–104)
ALT SERPL-CCNC: 14 U/L (ref 5–33)
ANION GAP SERPL CALCULATED.3IONS-SCNC: 9 MMOL/L (ref 9–17)
AST SERPL-CCNC: 19 U/L
BASOPHILS # BLD: 1 % (ref 0–2)
BASOPHILS ABSOLUTE: 0.05 K/UL (ref 0–0.2)
BILIRUB SERPL-MCNC: 0.4 MG/DL (ref 0.3–1.2)
BUN BLDV-MCNC: 13 MG/DL (ref 8–23)
BUN/CREAT BLD: 21 (ref 9–20)
CALCIUM SERPL-MCNC: 9.5 MG/DL (ref 8.6–10.4)
CHLORIDE BLD-SCNC: 97 MMOL/L (ref 98–107)
CHOLESTEROL/HDL RATIO: 3.4
CHOLESTEROL: 171 MG/DL
CO2: 28 MMOL/L (ref 20–31)
CREAT SERPL-MCNC: 0.62 MG/DL (ref 0.5–0.9)
EOSINOPHILS RELATIVE PERCENT: 1 % (ref 1–4)
ESTIMATED AVERAGE GLUCOSE: 111 MG/DL
GFR SERPL CREATININE-BSD FRML MDRD: >60 ML/MIN/1.73M2
GLUCOSE BLD-MCNC: 96 MG/DL (ref 70–99)
HBA1C MFR BLD: 5.5 % (ref 4–6)
HCT VFR BLD CALC: 41 % (ref 36.3–47.1)
HDLC SERPL-MCNC: 51 MG/DL
HEMOGLOBIN: 13.4 G/DL (ref 11.9–15.1)
IMMATURE GRANULOCYTES: 0 %
LDL CHOLESTEROL: 105 MG/DL (ref 0–130)
LYMPHOCYTES # BLD: 21 % (ref 24–43)
MAGNESIUM: 2.1 MG/DL (ref 1.6–2.6)
MCH RBC QN AUTO: 31.5 PG (ref 25.2–33.5)
MCHC RBC AUTO-ENTMCNC: 32.7 G/DL (ref 25.2–33.5)
MCV RBC AUTO: 96.2 FL (ref 82.6–102.9)
MONOCYTES # BLD: 10 % (ref 3–12)
NRBC AUTOMATED: 0 PER 100 WBC
PDW BLD-RTO: 11.7 % (ref 11.8–14.4)
PLATELET # BLD: 298 K/UL (ref 138–453)
PMV BLD AUTO: 8.7 FL (ref 8.1–13.5)
POTASSIUM SERPL-SCNC: 4.6 MMOL/L (ref 3.7–5.3)
RBC # BLD: 4.26 M/UL (ref 3.95–5.11)
SEG NEUTROPHILS: 67 % (ref 36–65)
SEGMENTED NEUTROPHILS ABSOLUTE COUNT: 5 K/UL (ref 1.5–8.1)
SODIUM BLD-SCNC: 134 MMOL/L (ref 135–144)
TOTAL PROTEIN: 7.6 G/DL (ref 6.4–8.3)
TRIGL SERPL-MCNC: 75 MG/DL
TSH SERPL DL<=0.05 MIU/L-ACNC: 1.92 UIU/ML (ref 0.3–5)
VITAMIN D 25-HYDROXY: 23.1 NG/ML
WBC # BLD: 7.5 K/UL (ref 3.5–11.3)

## 2022-12-13 PROCEDURE — 84443 ASSAY THYROID STIM HORMONE: CPT

## 2022-12-13 PROCEDURE — 36415 COLL VENOUS BLD VENIPUNCTURE: CPT

## 2022-12-13 PROCEDURE — 80053 COMPREHEN METABOLIC PANEL: CPT

## 2022-12-13 PROCEDURE — 82306 VITAMIN D 25 HYDROXY: CPT

## 2022-12-13 PROCEDURE — 80061 LIPID PANEL: CPT

## 2022-12-13 PROCEDURE — 83735 ASSAY OF MAGNESIUM: CPT

## 2022-12-13 PROCEDURE — 85025 COMPLETE CBC W/AUTO DIFF WBC: CPT

## 2022-12-13 PROCEDURE — 83036 HEMOGLOBIN GLYCOSYLATED A1C: CPT

## 2022-12-15 ENCOUNTER — OFFICE VISIT (OUTPATIENT)
Dept: FAMILY MEDICINE CLINIC | Age: 72
End: 2022-12-15
Payer: MEDICARE

## 2022-12-15 VITALS
HEART RATE: 67 BPM | HEIGHT: 62 IN | BODY MASS INDEX: 20.06 KG/M2 | SYSTOLIC BLOOD PRESSURE: 134 MMHG | RESPIRATION RATE: 16 BRPM | OXYGEN SATURATION: 97 % | WEIGHT: 109 LBS | DIASTOLIC BLOOD PRESSURE: 86 MMHG

## 2022-12-15 DIAGNOSIS — M80.00XD OSTEOPOROSIS WITH CURRENT PATHOLOGICAL FRACTURE WITH ROUTINE HEALING, UNSPECIFIED OSTEOPOROSIS TYPE, SUBSEQUENT ENCOUNTER: ICD-10-CM

## 2022-12-15 DIAGNOSIS — R73.01 IMPAIRED FASTING GLUCOSE: ICD-10-CM

## 2022-12-15 DIAGNOSIS — F41.9 ANXIETY: ICD-10-CM

## 2022-12-15 DIAGNOSIS — E83.42 HYPOMAGNESEMIA: ICD-10-CM

## 2022-12-15 DIAGNOSIS — E55.9 VITAMIN D DEFICIENCY: ICD-10-CM

## 2022-12-15 DIAGNOSIS — E78.2 MIXED HYPERLIPIDEMIA: ICD-10-CM

## 2022-12-15 DIAGNOSIS — Z78.0 MENOPAUSE: ICD-10-CM

## 2022-12-15 DIAGNOSIS — I10 ESSENTIAL HYPERTENSION: Primary | ICD-10-CM

## 2022-12-15 DIAGNOSIS — I48.0 PAROXYSMAL ATRIAL FIBRILLATION (HCC): ICD-10-CM

## 2022-12-15 PROCEDURE — 99213 OFFICE O/P EST LOW 20 MIN: CPT | Performed by: FAMILY MEDICINE

## 2022-12-15 SDOH — ECONOMIC STABILITY: FOOD INSECURITY: WITHIN THE PAST 12 MONTHS, THE FOOD YOU BOUGHT JUST DIDN'T LAST AND YOU DIDN'T HAVE MONEY TO GET MORE.: NEVER TRUE

## 2022-12-15 SDOH — ECONOMIC STABILITY: FOOD INSECURITY: WITHIN THE PAST 12 MONTHS, YOU WORRIED THAT YOUR FOOD WOULD RUN OUT BEFORE YOU GOT MONEY TO BUY MORE.: NEVER TRUE

## 2022-12-15 ASSESSMENT — PATIENT HEALTH QUESTIONNAIRE - PHQ9
SUM OF ALL RESPONSES TO PHQ9 QUESTIONS 1 & 2: 1
SUM OF ALL RESPONSES TO PHQ QUESTIONS 1-9: 1
SUM OF ALL RESPONSES TO PHQ QUESTIONS 1-9: 1
2. FEELING DOWN, DEPRESSED OR HOPELESS: 1
SUM OF ALL RESPONSES TO PHQ QUESTIONS 1-9: 1
SUM OF ALL RESPONSES TO PHQ QUESTIONS 1-9: 1
1. LITTLE INTEREST OR PLEASURE IN DOING THINGS: 0

## 2022-12-15 ASSESSMENT — ENCOUNTER SYMPTOMS
CONSTIPATION: 0
DIARRHEA: 0
SINUS PRESSURE: 1
NAUSEA: 0
ABDOMINAL PAIN: 0
SINUS PAIN: 1
SORE THROAT: 0
VOMITING: 0
BACK PAIN: 1
SHORTNESS OF BREATH: 0
TROUBLE SWALLOWING: 0
EYE REDNESS: 0
EYE DISCHARGE: 0
COUGH: 0
RHINORRHEA: 1
WHEEZING: 0

## 2022-12-15 ASSESSMENT — SOCIAL DETERMINANTS OF HEALTH (SDOH): HOW HARD IS IT FOR YOU TO PAY FOR THE VERY BASICS LIKE FOOD, HOUSING, MEDICAL CARE, AND HEATING?: NOT VERY HARD

## 2022-12-15 NOTE — PATIENT INSTRUCTIONS
Hospital Outpatient Visit on 12/13/2022   Component Date Value Ref Range Status    TSH 12/13/2022 1.92  0.30 - 5.00 uIU/mL Final    WBC 12/13/2022 7.5  3.5 - 11.3 k/uL Final    RBC 12/13/2022 4.26  3.95 - 5.11 m/uL Final    Hemoglobin 12/13/2022 13.4  11.9 - 15.1 g/dL Final    Hematocrit 12/13/2022 41.0  36.3 - 47.1 % Final    MCV 12/13/2022 96.2  82.6 - 102.9 fL Final    MCH 12/13/2022 31.5  25.2 - 33.5 pg Final    MCHC 12/13/2022 32.7  25.2 - 33.5 g/dL Final    RDW 12/13/2022 11.7 (A)  11.8 - 14.4 % Final    Platelets 39/73/5351 298  138 - 453 k/uL Final    MPV 12/13/2022 8.7  8.1 - 13.5 fL Final    NRBC Automated 12/13/2022 0.0  0.0 per 100 WBC Final    Seg Neutrophils 12/13/2022 67 (A)  36 - 65 % Final    Lymphocytes 12/13/2022 21 (A)  24 - 43 % Final    Monocytes 12/13/2022 10  3 - 12 % Final    Eosinophils % 12/13/2022 1  1 - 4 % Final    Basophils 12/13/2022 1  0 - 2 % Final    Immature Granulocytes 12/13/2022 0  0 % Final    Segs Absolute 12/13/2022 5.00  1.50 - 8.10 k/uL Final    Absolute Lymph # 12/13/2022 1.53  1.10 - 3.70 k/uL Final    Absolute Mono # 12/13/2022 0.76  0.10 - 1.20 k/uL Final    Absolute Eos # 12/13/2022 0.09  0.00 - 0.44 k/uL Final    Basophils Absolute 12/13/2022 0.05  0.00 - 0.20 k/uL Final    Absolute Immature Granulocyte 12/13/2022 0.03  0.00 - 0.30 k/uL Final    Glucose 12/13/2022 96  70 - 99 mg/dL Final    BUN 12/13/2022 13  8 - 23 mg/dL Final    Creatinine 12/13/2022 0.62  0.50 - 0.90 mg/dL Final    Est, Glom Filt Rate 12/13/2022 >60  >60 mL/min/1.73m2 Final    Comment:       Effective Oct 3, 2022        These results are not intended for use in patients <25years of age. eGFR results are calculated without a race factor using the 2021 CKD-EPI equation. Careful clinical correlation is recommended, particularly when comparing to results   calculated using previous equations.   The CKD-EPI equation is less accurate in patients with extremes of muscle mass, extra-renal metabolism of creatine, excessive creatine ingestion, or following therapy that affects   renal tubular secretion. Bun/Cre Ratio 12/13/2022 21 (A)  9 - 20 Final    Calcium 12/13/2022 9.5  8.6 - 10.4 mg/dL Final    Sodium 12/13/2022 134 (A)  135 - 144 mmol/L Final    Potassium 12/13/2022 4.6  3.7 - 5.3 mmol/L Final    Chloride 12/13/2022 97 (A)  98 - 107 mmol/L Final    CO2 12/13/2022 28  20 - 31 mmol/L Final    Anion Gap 12/13/2022 9  9 - 17 mmol/L Final    Alkaline Phosphatase 12/13/2022 82  35 - 104 U/L Final    ALT 12/13/2022 14  5 - 33 U/L Final    AST 12/13/2022 19  <32 U/L Final    Total Bilirubin 12/13/2022 0.4  0.3 - 1.2 mg/dL Final    Total Protein 12/13/2022 7.6  6.4 - 8.3 g/dL Final    Albumin 12/13/2022 4.5  3.5 - 5.2 g/dL Final    Albumin/Globulin Ratio 12/13/2022 1.5  1.0 - 2.5 Final    Hemoglobin A1C 12/13/2022 5.5  4.0 - 6.0 % Final    Estimated Avg Glucose 12/13/2022 111  mg/dL Final    Comment: The ADA and AACC recommend providing the estimated average glucose result to permit better   patient understanding of their HBA1c result. Cholesterol 12/13/2022 171  <200 mg/dL Final    Comment:    Cholesterol Guidelines:      <200  Desirable   200-240  Borderline      >240  Undesirable         HDL 12/13/2022 51  >40 mg/dL Final    Comment:    HDL Guidelines:    <40     Undesirable   40-59    Borderline    >59     Desirable         LDL Cholesterol 12/13/2022 105  0 - 130 mg/dL Final    Comment:    LDL Guidelines:     <100    Desirable   100-129   Near to/above Desirable   130-159   Borderline      >159   Undesirable     Direct (measured) LDL and calculated LDL are not interchangeable tests. Chol/HDL Ratio 12/13/2022 3.4  <5 Final            Triglycerides 12/13/2022 75  <150 mg/dL Final    Comment:    Triglyceride Guidelines:     <150   Desirable   150-199  Borderline   200-499  High     >499   Very high   Based on AHA Guidelines for fasting triglyceride, October 2012.          Magnesium 12/13/2022 2.1  1.6 - 2.6 mg/dL Final    Vit D, 25-Hydroxy 12/13/2022 23.1 (A)  >29.9 ng/mL Final    Comment:    Reference Range:  Vitamin D status         Range   Deficiency              <20 ng/mL   Mild Deficiency       20-30 ng/mL   Sufficiency           ng/mL   Toxicity               >100 ng/mL

## 2022-12-15 NOTE — PROGRESS NOTES
12/15/2022     Carolyn Hale (:  1950) is a 71 y.o. female, here for evaluation of the following medical concerns:    HPI  Patient comes in today for follow up of chronic health issues Patient did state that recently she was picking up some heavy objects and felt a pop in her left lower anterior rib. She has had pain in that area ever since. Did not actually hit it just was throwing a bag of heavy material and felt the pop. She has not had any difficulty breathing and it seems to be getting better over time but just want to bring it to my attention. Also has had some pain to the epigastric region that developed after she bent over and she felt almost like a twisting sensation in the abdomen but that did seem to subside as well. She just was not sure if this was anything to be concerned about. Not have any bowel symptoms. Has some chronic back pain related to known compression fractures that required kyphoplasty but this does not seem to be acutely flared at this time. She also has been having some ongoing sinus symptoms for quite some time off and on. Has had some pain and pressure in the sinuses. No drainage. She had done a course of antibiotic but it did not really seem to help. With this in mind I did discuss with her further treatment options and at this point she thinks she wants to try seeing her acupuncturist as this had helped with some chronic sinus symptoms in the past.  Patient does have a known history of osteoporosis and is due for a DEXA scan. Has a known history of hypertension her blood pressure is stable and controlled on her current medical therapy. Does have a known history of hyperlipidemia and her cholesterol levels are stable and controlled with her current dietary regimen and supplemental therapy. Has a known history of impaired fasting glucose her blood sugar level is stable and controlled on her current medical regimen.   Has known hypomagnesemia and her magnesium levels adequately supplemented at this time. Has a known history of paroxysmal atrial fibrillation but is not having any acute cardiac symptoms at this time. Has a known history of vitamin D deficiency but states that she cannot tolerate oral vitamin D as it causes some fluttering in her heart. Did encourage increase vitamin D rich foods in her diet. Patient otherwise has no other acute medical concerns. .  Patient's recent lab reports are as follows:    Results for orders placed or performed during the hospital encounter of 12/13/22   TSH   Result Value Ref Range    TSH 1.92 0.30 - 5.00 uIU/mL   CBC with Auto Differential   Result Value Ref Range    WBC 7.5 3.5 - 11.3 k/uL    RBC 4.26 3.95 - 5.11 m/uL    Hemoglobin 13.4 11.9 - 15.1 g/dL    Hematocrit 41.0 36.3 - 47.1 %    MCV 96.2 82.6 - 102.9 fL    MCH 31.5 25.2 - 33.5 pg    MCHC 32.7 25.2 - 33.5 g/dL    RDW 11.7 (L) 11.8 - 14.4 %    Platelets 935 662 - 235 k/uL    MPV 8.7 8.1 - 13.5 fL    NRBC Automated 0.0 0.0 per 100 WBC    Seg Neutrophils 67 (H) 36 - 65 %    Lymphocytes 21 (L) 24 - 43 %    Monocytes 10 3 - 12 %    Eosinophils % 1 1 - 4 %    Basophils 1 0 - 2 %    Immature Granulocytes 0 0 %    Segs Absolute 5.00 1.50 - 8.10 k/uL    Absolute Lymph # 1.53 1.10 - 3.70 k/uL    Absolute Mono # 0.76 0.10 - 1.20 k/uL    Absolute Eos # 0.09 0.00 - 0.44 k/uL    Basophils Absolute 0.05 0.00 - 0.20 k/uL    Absolute Immature Granulocyte 0.03 0.00 - 0.30 k/uL   Comprehensive Metabolic Panel   Result Value Ref Range    Glucose 96 70 - 99 mg/dL    BUN 13 8 - 23 mg/dL    Creatinine 0.62 0.50 - 0.90 mg/dL    Est, Glom Filt Rate >60 >60 mL/min/1.73m2    Bun/Cre Ratio 21 (H) 9 - 20    Calcium 9.5 8.6 - 10.4 mg/dL    Sodium 134 (L) 135 - 144 mmol/L    Potassium 4.6 3.7 - 5.3 mmol/L    Chloride 97 (L) 98 - 107 mmol/L    CO2 28 20 - 31 mmol/L    Anion Gap 9 9 - 17 mmol/L    Alkaline Phosphatase 82 35 - 104 U/L    ALT 14 5 - 33 U/L    AST 19 <32 U/L    Total Bilirubin 0.4 0.3 - 1.2 mg/dL    Total Protein 7.6 6.4 - 8.3 g/dL    Albumin 4.5 3.5 - 5.2 g/dL    Albumin/Globulin Ratio 1.5 1.0 - 2.5   Hemoglobin A1C   Result Value Ref Range    Hemoglobin A1C 5.5 4.0 - 6.0 %    Estimated Avg Glucose 111 mg/dL   Lipid Panel   Result Value Ref Range    Cholesterol 171 <200 mg/dL    HDL 51 >40 mg/dL    LDL Cholesterol 105 0 - 130 mg/dL    Chol/HDL Ratio 3.4 <5    Triglycerides 75 <150 mg/dL   Magnesium   Result Value Ref Range    Magnesium 2.1 1.6 - 2.6 mg/dL   Vitamin D 25 Hydroxy   Result Value Ref Range    Vit D, 25-Hydroxy 23.1 (L) >29.9 ng/mL      Other review of systems are as noted below. Preventative measures are reviewed today. See health maintenance section for complete preventative plan of care. Did review patient's med list, allergies, social history, fam history, pmhx and pshx today as noted in the record. Review of Systems   Constitutional:  Negative for chills, fatigue and fever. HENT:  Positive for congestion, postnasal drip, rhinorrhea, sinus pressure and sinus pain. Negative for ear pain, sore throat and trouble swallowing. Eyes:  Negative for discharge and redness. Respiratory:  Negative for cough, shortness of breath and wheezing. Cardiovascular:  Negative for chest pain. Gastrointestinal:  Negative for abdominal pain, constipation, diarrhea, nausea and vomiting. Genitourinary:  Negative for dysuria, flank pain, frequency and urgency. Musculoskeletal:  Positive for back pain. Negative for arthralgias, myalgias and neck pain. Skin:  Negative for rash and wound. Allergic/Immunologic: Negative for environmental allergies. Neurological:  Negative for dizziness, weakness, light-headedness and headaches. Hematological:  Negative for adenopathy. Psychiatric/Behavioral: Negative. Prior to Visit Medications    Medication Sig Taking?  Authorizing Provider   ALPRAZolam Neenafe Florez) 0.25 MG tablet Take 1 tablet by mouth 3 times daily as needed for Sleep for up to 90 days. TAKE 1 TABLET BY MOUTH THREE TIMES A DAY AS NEEDED FOR ANXIETY  Roseanna Marco AntonioDO   potassium chloride (MICRO-K) 10 MEQ extended release capsule TAKE 1 CAPSULE BY MOUTH EVERY DAY AS NEEDED IF TAKING LASIX  LAINA Gilbert   cephALEXin (KEFLEX) 500 MG capsule Take 1 capsule by mouth 3 times daily  Luz Maria Purvis DO   furosemide (LASIX) 20 MG tablet take 1 tablet by mouth as needed for weight gain of 3-4 pounds overnight  Luz Maria Purvis DO   dilTIAZem (TIAZAC) 240 MG extended release capsule Take 240 mg by mouth daily  Historical Provider, MD   aspirin 81 MG chewable tablet Take 81 mg by mouth daily  Historical Provider, MD   valACYclovir (VALTREX) 500 MG tablet 1 tab bid  Luz Maria Purvis DO   dilTIAZem (CARDIZEM) 60 MG tablet TAKE 1 TABLET BY MOUTH UP TO THREE TIMES DAILY AS NEEDED FOR A-FIB  Roseannajeronimo BernardDO   famotidine (PEPCID) 20 MG tablet Take 1 tablet by mouth 2 times daily  Luz Maria Purvis DO   melatonin 3 MG TABS tablet Take 3 mg by mouth  Historical Provider, MD   Plant Sterols and Stanols (CHOLESTOFF PO) Take 1,800 mg by mouth daily  Historical Provider, MD        Social History     Tobacco Use    Smoking status: Former     Packs/day: 0.25     Years: 20.00     Pack years: 5.00     Types: Cigarettes     Quit date: 5/15/2009     Years since quittin.5    Smokeless tobacco: Never    Tobacco comments:     social   Substance Use Topics    Alcohol use: No        There were no vitals filed for this visit. Estimated body mass index is 20.08 kg/m² as calculated from the following:    Height as of 22: 5' 2\" (1.575 m). Weight as of 22: 109 lb 12.8 oz (49.8 kg). Physical Exam  Vitals and nursing note reviewed. Constitutional:       General: She is not in acute distress. Appearance: Normal appearance. She is well-developed. She is not diaphoretic. HENT:      Head: Normocephalic and atraumatic.       Right Ear: Tympanic membrane, ear canal and external ear normal.      Left Ear: Tympanic membrane, ear canal and external ear normal.      Nose: Congestion present. No rhinorrhea. Mouth/Throat:      Mouth: Mucous membranes are moist.      Pharynx: Oropharynx is clear. No oropharyngeal exudate. Eyes:      General:         Right eye: No discharge. Left eye: No discharge. Conjunctiva/sclera: Conjunctivae normal.      Pupils: Pupils are equal, round, and reactive to light. Neck:      Thyroid: No thyromegaly. Cardiovascular:      Rate and Rhythm: Normal rate and regular rhythm. Heart sounds: Normal heart sounds. Pulmonary:      Effort: Pulmonary effort is normal.      Breath sounds: Normal breath sounds. No wheezing or rales. Abdominal:      General: Bowel sounds are normal. There is no distension. Palpations: Abdomen is soft. Tenderness: There is no abdominal tenderness. Musculoskeletal:      Cervical back: Normal range of motion and neck supple. Lymphadenopathy:      Cervical: No cervical adenopathy. Skin:     General: Skin is warm and dry. Findings: No rash. Neurological:      Mental Status: She is alert and oriented to person, place, and time. Psychiatric:         Behavior: Behavior normal.         Thought Content: Thought content normal.         Judgment: Judgment normal.         ASSESSMENT/PLAN:  Encounter Diagnoses   Name Primary? Essential hypertension Yes    Mixed hyperlipidemia     Impaired fasting glucose     Hypomagnesemia     Anxiety     Paroxysmal atrial fibrillation (HCC)     Vitamin D deficiency     Osteoporosis with current pathological fracture with routine healing, unspecified osteoporosis type, subsequent encounter     Menopause      No orders of the defined types were placed in this encounter.   Orders Placed This Encounter   Procedures    DEXA AXIAL SKELETON W VERTEBRAL FX ASST     Standing Status:   Future     Standing Expiration Date:   12/15/2023     Order Specific Question: Reason for exam:     Answer:   follow up of osteoporosis    CBC with Auto Differential     Standing Status:   Future     Standing Expiration Date:   12/15/2023    Comprehensive Metabolic Panel     Standing Status:   Future     Standing Expiration Date:   12/15/2023    Hemoglobin A1C     Standing Status:   Future     Standing Expiration Date:   12/15/2023    Lipid Panel     Standing Status:   Future     Standing Expiration Date:   12/15/2023     Order Specific Question:   Is Patient Fasting?/# of Hours     Answer:   12 hours    Magnesium     Standing Status:   Future     Standing Expiration Date:   12/15/2023    Vitamin D 25 Hydroxy     Standing Status:   Future     Standing Expiration Date:   12/15/2023    TSH     Standing Status:   Future     Standing Expiration Date:   12/15/2023     Patient is to get a repeat DEXA scan to assess her bone density. Patient is to continue on the rest of her current medical therapy. No additional changes are made at this time. Patient is to continue to follow a low-carb/low sugar/low-fat diet and increase exercise for optimal blood sugar and cholesterol control. Patient is to follow-up with an acupuncturist as planned to see if this will help with some of her ongoing sinus symptoms. Patient is to return to my office in 6 months duration or sooner if any further problems or symptoms arise. (Please note that portions of this note were completed with a voice recognition program. Efforts were made to edit the dictations but occasionally words are mis-transcribed.)      No follow-ups on file. An electronic signature was used to authenticate this note.     --JoeWorcester City Hospitalcheryl I[place], DO on 12/15/2022 at 7:06 AM

## 2022-12-15 NOTE — PROGRESS NOTES
Patient declines influenza, Covid, shingles, pneumonia, and tetanus vaccine. She does not want to do a cologuard or mammogram at this time.

## 2022-12-27 ENCOUNTER — HOSPITAL ENCOUNTER (OUTPATIENT)
Dept: BONE DENSITY | Age: 72
Discharge: HOME | End: 2022-12-29
Payer: MEDICARE

## 2022-12-27 DIAGNOSIS — Z78.0 MENOPAUSE: ICD-10-CM

## 2022-12-27 DIAGNOSIS — M80.00XD OSTEOPOROSIS WITH CURRENT PATHOLOGICAL FRACTURE WITH ROUTINE HEALING, UNSPECIFIED OSTEOPOROSIS TYPE, SUBSEQUENT ENCOUNTER: ICD-10-CM

## 2022-12-27 PROCEDURE — 77085 DXA BONE DENSITY AXL VRT FX: CPT

## 2023-02-20 DIAGNOSIS — F41.9 ANXIETY: ICD-10-CM

## 2023-02-20 RX ORDER — ALPRAZOLAM 0.25 MG/1
TABLET ORAL
Qty: 90 TABLET | Refills: 0 | Status: SHIPPED | OUTPATIENT
Start: 2023-02-20 | End: 2023-05-21

## 2023-02-20 NOTE — TELEPHONE ENCOUNTER
Per [facility], last fill 12/26/22, quantity 90 for 30 days.    Sera Isabel called requesting a refill of the below medication which has been pended for you:     Requested Prescriptions     Pending Prescriptions Disp Refills    ALPRAZolam (XANAX) 0.25 MG tablet [Pharmacy Med Name: ALPRAZolam Oral Tablet 0.25 MG] 90 tablet 0     Sig: Take 1 tablet by mouth 3 times daily as needed for Sleep/ANXIETY       Last Appointment Date: 12/15/2022  Next Appointment Date: 6/20/2023    Allergies   Allergen Reactions    Amiodarone Shortness Of Breath     Authoring Clinician or Source System: Perfecto Rash  Respiratory difficulty    Amoxicillin Anaphylaxis    Beta Adrenergic Blockers Shortness Of Breath    Metoprolol Shortness Of Breath     \"Beta Blockers    Penicillin V Potassium Anaphylaxis    Lisinopril Other (See Comments)     Eyes were beating along with her heart beat/HTN    Dronedarone Other (See Comments)     Irregular Heart Beats  Authoring Clinician or Source System: Kyara Schmitt  ([employer])  Edema and irregular heartbeats    Epinephrine Other (See Comments)     dental admin gave her afib      Statins     Morphine Nausea Only, Nausea And Vomiting and Other (See Comments)     Nausea  Other reaction(s): GI Upset  Nausea    Nexium [Esomeprazole Magnesium] Anxiety     Heart flutters more than normal.    Proton Pump Inhibitors Nausea And Vomiting and Anxiety     Other reaction(s): GI Distress, GI Upset  Heart flutters more than normal.

## 2023-02-22 ENCOUNTER — PATIENT MESSAGE (OUTPATIENT)
Dept: FAMILY MEDICINE CLINIC | Age: 73
End: 2023-02-22

## 2023-02-23 RX ORDER — CLINDAMYCIN HYDROCHLORIDE 300 MG/1
CAPSULE ORAL
Qty: 8 CAPSULE | Refills: 0 | Status: SHIPPED | OUTPATIENT
Start: 2023-02-23

## 2023-02-23 RX ORDER — VALACYCLOVIR HYDROCHLORIDE 500 MG/1
TABLET, FILM COATED ORAL
Qty: 6 TABLET | Refills: 2 | Status: SHIPPED | OUTPATIENT
Start: 2023-02-23

## 2023-02-23 NOTE — TELEPHONE ENCOUNTER
Benita Nelson called requesting a refill of the below medication which has been pended for you:     Requested Prescriptions     Pending Prescriptions Disp Refills    clindamycin (CLEOCIN) 300 MG capsule 8 capsule 0     Sig: Take 600 mg 1 hr prior to dental procedures       Last Appointment Date: 12/15/2022  Next Appointment Date: 6/20/2023    Allergies   Allergen Reactions    Amiodarone Shortness Of Breath     Authoring Clinician or Source System: Tierney Drop  Respiratory difficulty    Amoxicillin Anaphylaxis    Beta Adrenergic Blockers Shortness Of Breath    Metoprolol Shortness Of Breath     \"Beta Blockers    Penicillin V Potassium Anaphylaxis    Lisinopril Other (See Comments)     Eyes were beating along with her heart beat/HTN    Dronedarone Other (See Comments)     Irregular Heart Beats  Authoring Clinician or Source System: Kyara Schmitt  ([employer])  Edema and irregular heartbeats    Epinephrine Other (See Comments)     dental admin gave her afib      Statins     Morphine Nausea Only, Nausea And Vomiting and Other (See Comments)     Nausea  Other reaction(s): GI Upset  Nausea    Nexium [Esomeprazole Magnesium] Anxiety     Heart flutters more than normal.    Proton Pump Inhibitors Nausea And Vomiting and Anxiety     Other reaction(s): GI Distress, GI Upset  Heart flutters more than normal.

## 2023-02-23 NOTE — TELEPHONE ENCOUNTER
From: Yordan Aranda  To: Dr. Chandler Creed: 2/22/2023 8:55 PM EST  Subject: Clindamycin Refill    Dr. Alan Barthel,   I need a refill for Clindamycin 500mg, 2 taken 1 hour before dental work. It is not on my med list. May I please have 6 or 8 tabs?   Thank you,  Verenice Guo

## 2023-02-23 NOTE — TELEPHONE ENCOUNTER
Nasrin Pedroza called requesting a refill of the below medication which has been pended for you:     Requested Prescriptions     Pending Prescriptions Disp Refills    valACYclovir (VALTREX) 500 MG tablet 6 tablet 2     Si tab bid       Last Appointment Date: 12/15/2022  Next Appointment Date: 2023    Allergies   Allergen Reactions    Amiodarone Shortness Of Breath     Authoring Clinician or Source System: Kelsie Sanchez  Respiratory difficulty    Amoxicillin Anaphylaxis    Beta Adrenergic Blockers Shortness Of Breath    Metoprolol Shortness Of Breath     \"Beta Blockers    Penicillin V Potassium Anaphylaxis    Lisinopril Other (See Comments)     Eyes were beating along with her heart beat/HTN    Dronedarone Other (See Comments)     Irregular Heart Beats  Authoring Clinician or Source System: Kyara Schmitt  ([employer])  Edema and irregular heartbeats    Epinephrine Other (See Comments)     dental admin gave her afib      Statins     Morphine Nausea Only, Nausea And Vomiting and Other (See Comments)     Nausea  Other reaction(s): GI Upset  Nausea    Nexium [Esomeprazole Magnesium] Anxiety     Heart flutters more than normal.    Proton Pump Inhibitors Nausea And Vomiting and Anxiety     Other reaction(s): GI Distress, GI Upset  Heart flutters more than normal.

## 2023-03-16 RX ORDER — FAMOTIDINE 20 MG/1
TABLET, FILM COATED ORAL
Qty: 180 TABLET | Refills: 0 | Status: SHIPPED | OUTPATIENT
Start: 2023-03-16

## 2023-03-16 NOTE — TELEPHONE ENCOUNTER
Renee Eid called requesting a refill of the below medication which has been pended for you:     Requested Prescriptions     Pending Prescriptions Disp Refills    famotidine (PEPCID) 20 MG tablet [Pharmacy Med Name: Famotidine Oral Tablet 20 MG] 180 tablet 0     Sig: TAKE 1 TABLET BY MOUTH TWO TIMES A DAY       Last Appointment Date: 12/15/2022  Next Appointment Date: 6/20/2023    Allergies   Allergen Reactions    Amiodarone Shortness Of Breath     Authoring Clinician or Source System: Noemy Stein  Respiratory difficulty    Amoxicillin Anaphylaxis    Beta Adrenergic Blockers Shortness Of Breath    Metoprolol Shortness Of Breath     \"Beta Blockers    Penicillin V Potassium Anaphylaxis    Lisinopril Other (See Comments)     Eyes were beating along with her heart beat/HTN    Dronedarone Other (See Comments)     Irregular Heart Beats  Authoring Clinician or Source System: Kyara Schmitt  ([employer])  Edema and irregular heartbeats    Epinephrine Other (See Comments)     dental admin gave her afib      Statins     Morphine Nausea Only, Nausea And Vomiting and Other (See Comments)     Nausea  Other reaction(s): GI Upset  Nausea    Nexium [Esomeprazole Magnesium] Anxiety     Heart flutters more than normal.    Proton Pump Inhibitors Nausea And Vomiting and Anxiety     Other reaction(s): GI Distress, GI Upset  Heart flutters more than normal.

## 2023-03-22 DIAGNOSIS — F41.9 ANXIETY: ICD-10-CM

## 2023-03-22 RX ORDER — ALPRAZOLAM 0.25 MG/1
TABLET ORAL
Qty: 90 TABLET | Refills: 0 | Status: SHIPPED | OUTPATIENT
Start: 2023-03-22 | End: 2023-03-24 | Stop reason: SDUPTHER

## 2023-03-23 ENCOUNTER — PATIENT MESSAGE (OUTPATIENT)
Dept: FAMILY MEDICINE CLINIC | Age: 73
End: 2023-03-23

## 2023-03-23 DIAGNOSIS — F41.9 ANXIETY: ICD-10-CM

## 2023-03-24 RX ORDER — ALPRAZOLAM 0.25 MG/1
TABLET ORAL
Qty: 90 TABLET | Refills: 0 | Status: SHIPPED | OUTPATIENT
Start: 2023-03-24 | End: 2023-04-23

## 2023-03-24 NOTE — TELEPHONE ENCOUNTER
Controlled Substance Monitoring:    Acute and Chronic Pain Monitoring:   RX Monitoring 3/24/2023   Attestation -   Periodic Controlled Substance Monitoring Possible medication side effects, risk of tolerance/dependence & alternative treatments discussed.    Chronic Pain > 80 MEDD -   Chronic Pain > 120 MEDD -

## 2023-03-24 NOTE — TELEPHONE ENCOUNTER
Nisreen Baer cancelled script Dr Geoff Sanchez sent 3/22/2023 as [facility] did not have it stock due to recall. Spoke with [facility] and they do have this in stock to dispense.

## 2023-03-24 NOTE — TELEPHONE ENCOUNTER
From: Author Rogres  To: Dr. Drew Lr: 3/23/2023 11:37 AM EDT  Subject: Xanax back ordered or recalled    Yesterday you sent a script to [facility] for Xanax .25mg. They called this morning and first said it was backordered then she said it was recalled. Since this is a 'new script', they couldn't call and see who has it. Told me to have you call/send a script to a new pharmacy. Please send to [facility] or [facility] and if possible, see if it is in stock. I found when Ed Abbie was ill they would not tell me if a med was in stock or not. If no one has it, is there an alternative you can prescribe? Or could you do . 50mg of the xanax and say to take half? Thank you very much!   Palma Guthrie

## 2023-04-05 RX ORDER — DILTIAZEM HYDROCHLORIDE 60 MG/1
TABLET, FILM COATED ORAL
Qty: 270 TABLET | Refills: 1 | Status: SHIPPED | OUTPATIENT
Start: 2023-04-05

## 2023-04-05 NOTE — TELEPHONE ENCOUNTER
Washington Rivera called requesting a refill of the below medication which has been pended for you:     Requested Prescriptions     Pending Prescriptions Disp Refills    dilTIAZem (CARDIZEM) 60 MG tablet 270 tablet 1     Sig: TAKE 1 TABLET BY MOUTH UP TO THREE TIMES DAILY AS NEEDED FOR A-FIB       Last Appointment Date: 12/15/2022  Next Appointment Date: 6/20/2023    Allergies   Allergen Reactions    Amiodarone Shortness Of Breath     Authoring Clinician or Source System: Valley Mantis  Respiratory difficulty    Amoxicillin Anaphylaxis    Beta Adrenergic Blockers Shortness Of Breath    Metoprolol Shortness Of Breath     \"Beta Blockers    Penicillin V Potassium Anaphylaxis    Lisinopril Other (See Comments)     Eyes were beating along with her heart beat/HTN    Dronedarone Other (See Comments)     Irregular Heart Beats  Authoring Clinician or Source System: Kyara Schmitt  ([employer])  Edema and irregular heartbeats    Epinephrine Other (See Comments)     dental admin gave her afib      Statins     Morphine Nausea Only, Nausea And Vomiting and Other (See Comments)     Nausea  Other reaction(s): GI Upset  Nausea    Nexium [Esomeprazole Magnesium] Anxiety     Heart flutters more than normal.    Proton Pump Inhibitors Nausea And Vomiting and Anxiety     Other reaction(s): GI Distress, GI Upset  Heart flutters more than normal.

## 2023-04-25 DIAGNOSIS — F41.9 ANXIETY: ICD-10-CM

## 2023-04-25 RX ORDER — ALPRAZOLAM 0.25 MG/1
0.25 TABLET ORAL 3 TIMES DAILY PRN
Qty: 90 TABLET | Refills: 2 | Status: SHIPPED | OUTPATIENT
Start: 2023-04-25 | End: 2023-07-24

## 2023-04-25 NOTE — TELEPHONE ENCOUNTER
Alexandra Hinson called requesting a refill of the below medication which has been pended for you: last filled 3/24/2023 #90    Requested Prescriptions     Pending Prescriptions Disp Refills    ALPRAZolam (XANAX) 0.25 MG tablet [Pharmacy Med Name: ALPRAZOLAM 0.25MG TABLETS] 90 tablet      Sig: TAKE 1 TABLET BY MOUTH THREE TIMES DAILY AS NEEDED FOR SLEEP OR ANXIETY       Last Appointment Date: 12/15/2022  Next Appointment Date: 6/20/2023    Allergies   Allergen Reactions    Amiodarone Shortness Of Breath     Authoring Clinician or Source System: Giuseppe Ripple  Respiratory difficulty    Amoxicillin Anaphylaxis    Beta Adrenergic Blockers Shortness Of Breath    Metoprolol Shortness Of Breath     \"Beta Blockers    Penicillin V Potassium Anaphylaxis    Lisinopril Other (See Comments)     Eyes were beating along with her heart beat/HTN    Dronedarone Other (See Comments)     Irregular Heart Beats  Authoring Clinician or Source System: Kyara Schmitt  ([employer])  Edema and irregular heartbeats    Epinephrine Other (See Comments)     dental admin gave her afib      Statins     Morphine Nausea Only, Nausea And Vomiting and Other (See Comments)     Nausea  Other reaction(s): GI Upset  Nausea    Nexium [Esomeprazole Magnesium] Anxiety     Heart flutters more than normal.    Proton Pump Inhibitors Nausea And Vomiting and Anxiety     Other reaction(s): GI Distress, GI Upset  Heart flutters more than normal.

## 2023-05-16 RX ORDER — CLINDAMYCIN HYDROCHLORIDE 300 MG/1
CAPSULE ORAL
Qty: 8 CAPSULE | Refills: 0 | Status: SHIPPED | OUTPATIENT
Start: 2023-05-16

## 2023-05-16 RX ORDER — FUROSEMIDE 20 MG/1
TABLET ORAL
Qty: 90 TABLET | Refills: 1 | Status: SHIPPED | OUTPATIENT
Start: 2023-05-16

## 2023-05-16 NOTE — TELEPHONE ENCOUNTER
Kevin Mayfield called requesting a refill of the below medication which has been pended for you:     Requested Prescriptions     Pending Prescriptions Disp Refills    furosemide (LASIX) 20 MG tablet 90 tablet 0     Sig: take 1 tablet by mouth as needed for weight gain of 3-4 pounds overnight    clindamycin (CLEOCIN) 300 MG capsule 8 capsule 0     Sig: Take 600 mg 1 hr prior to dental procedures       Last Appointment Date: 4/13/2023  Next Appointment Date: 6/20/2023    Allergies   Allergen Reactions    Amiodarone Shortness Of Breath     Authoring Clinician or Source System: Rory Richardson  Respiratory difficulty    Amoxicillin Anaphylaxis    Beta Adrenergic Blockers Shortness Of Breath    Metoprolol Shortness Of Breath     \"Beta Blockers    Penicillin V Potassium Anaphylaxis    Lisinopril Other (See Comments)     Eyes were beating along with her heart beat/HTN    Dronedarone Other (See Comments)     Irregular Heart Beats  Authoring Clinician or Source System: Kyara Schmitt  ([employer])  Edema and irregular heartbeats    Epinephrine Other (See Comments)     dental admin gave her afib      Statins     Morphine Nausea Only, Nausea And Vomiting and Other (See Comments)     Nausea  Other reaction(s): GI Upset  Nausea    Nexium [Esomeprazole Magnesium] Anxiety     Heart flutters more than normal.    Proton Pump Inhibitors Nausea And Vomiting and Anxiety     Other reaction(s): GI Distress, GI Upset  Heart flutters more than normal.

## 2023-06-20 ENCOUNTER — OFFICE VISIT (OUTPATIENT)
Dept: FAMILY MEDICINE CLINIC | Age: 73
End: 2023-06-20
Payer: MEDICARE

## 2023-06-20 VITALS
DIASTOLIC BLOOD PRESSURE: 80 MMHG | TEMPERATURE: 97.4 F | WEIGHT: 122 LBS | BODY MASS INDEX: 22.45 KG/M2 | OXYGEN SATURATION: 95 % | HEART RATE: 68 BPM | HEIGHT: 62 IN | SYSTOLIC BLOOD PRESSURE: 136 MMHG | RESPIRATION RATE: 16 BRPM

## 2023-06-20 DIAGNOSIS — R73.01 IMPAIRED FASTING GLUCOSE: ICD-10-CM

## 2023-06-20 DIAGNOSIS — I10 ESSENTIAL HYPERTENSION: Primary | ICD-10-CM

## 2023-06-20 DIAGNOSIS — E55.9 VITAMIN D DEFICIENCY: ICD-10-CM

## 2023-06-20 DIAGNOSIS — E78.2 MIXED HYPERLIPIDEMIA: ICD-10-CM

## 2023-06-20 DIAGNOSIS — I48.0 PAROXYSMAL ATRIAL FIBRILLATION (HCC): ICD-10-CM

## 2023-06-20 DIAGNOSIS — E83.42 HYPOMAGNESEMIA: ICD-10-CM

## 2023-06-20 PROCEDURE — 3074F SYST BP LT 130 MM HG: CPT | Performed by: FAMILY MEDICINE

## 2023-06-20 PROCEDURE — 1090F PRES/ABSN URINE INCON ASSESS: CPT | Performed by: FAMILY MEDICINE

## 2023-06-20 PROCEDURE — G8420 CALC BMI NORM PARAMETERS: HCPCS | Performed by: FAMILY MEDICINE

## 2023-06-20 PROCEDURE — 99214 OFFICE O/P EST MOD 30 MIN: CPT | Performed by: FAMILY MEDICINE

## 2023-06-20 PROCEDURE — 1123F ACP DISCUSS/DSCN MKR DOCD: CPT | Performed by: FAMILY MEDICINE

## 2023-06-20 PROCEDURE — 99213 OFFICE O/P EST LOW 20 MIN: CPT | Performed by: FAMILY MEDICINE

## 2023-06-20 PROCEDURE — G8399 PT W/DXA RESULTS DOCUMENT: HCPCS | Performed by: FAMILY MEDICINE

## 2023-06-20 PROCEDURE — G8427 DOCREV CUR MEDS BY ELIG CLIN: HCPCS | Performed by: FAMILY MEDICINE

## 2023-06-20 PROCEDURE — 1036F TOBACCO NON-USER: CPT | Performed by: FAMILY MEDICINE

## 2023-06-20 PROCEDURE — 3078F DIAST BP <80 MM HG: CPT | Performed by: FAMILY MEDICINE

## 2023-06-20 PROCEDURE — 3017F COLORECTAL CA SCREEN DOC REV: CPT | Performed by: FAMILY MEDICINE

## 2023-06-20 RX ORDER — CLINDAMYCIN HYDROCHLORIDE 300 MG/1
CAPSULE ORAL
Qty: 8 CAPSULE | Refills: 2 | Status: SHIPPED | OUTPATIENT
Start: 2023-06-20

## 2023-06-20 RX ORDER — TRIAMCINOLONE ACETONIDE 0.25 MG/G
CREAM TOPICAL
COMMUNITY
Start: 2023-06-14

## 2023-06-20 SDOH — ECONOMIC STABILITY: INCOME INSECURITY: HOW HARD IS IT FOR YOU TO PAY FOR THE VERY BASICS LIKE FOOD, HOUSING, MEDICAL CARE, AND HEATING?: NOT HARD AT ALL

## 2023-06-20 SDOH — ECONOMIC STABILITY: FOOD INSECURITY: WITHIN THE PAST 12 MONTHS, THE FOOD YOU BOUGHT JUST DIDN'T LAST AND YOU DIDN'T HAVE MONEY TO GET MORE.: NEVER TRUE

## 2023-06-20 SDOH — ECONOMIC STABILITY: FOOD INSECURITY: WITHIN THE PAST 12 MONTHS, YOU WORRIED THAT YOUR FOOD WOULD RUN OUT BEFORE YOU GOT MONEY TO BUY MORE.: NEVER TRUE

## 2023-06-20 SDOH — ECONOMIC STABILITY: HOUSING INSECURITY
IN THE LAST 12 MONTHS, WAS THERE A TIME WHEN YOU DID NOT HAVE A STEADY PLACE TO SLEEP OR SLEPT IN A SHELTER (INCLUDING NOW)?: NO

## 2023-06-20 ASSESSMENT — ENCOUNTER SYMPTOMS
SORE THROAT: 0
DIARRHEA: 0
CONSTIPATION: 0
EYE REDNESS: 0
VOMITING: 0
TROUBLE SWALLOWING: 0
NAUSEA: 0
EYE DISCHARGE: 0
COUGH: 0
WHEEZING: 0
SINUS PRESSURE: 0
ABDOMINAL PAIN: 0
SHORTNESS OF BREATH: 0

## 2023-06-20 ASSESSMENT — PATIENT HEALTH QUESTIONNAIRE - PHQ9
SUM OF ALL RESPONSES TO PHQ QUESTIONS 1-9: 0
SUM OF ALL RESPONSES TO PHQ9 QUESTIONS 1 & 2: 0
SUM OF ALL RESPONSES TO PHQ QUESTIONS 1-9: 0
2. FEELING DOWN, DEPRESSED OR HOPELESS: 0
1. LITTLE INTEREST OR PLEASURE IN DOING THINGS: 0
SUM OF ALL RESPONSES TO PHQ QUESTIONS 1-9: 0
SUM OF ALL RESPONSES TO PHQ QUESTIONS 1-9: 0

## 2023-06-20 NOTE — PATIENT INSTRUCTIONS
Hospital Outpatient Visit on 06/14/2023   Component Date Value Ref Range Status    TSH 06/14/2023 2.40  0.30 - 5.00 uIU/mL Final    Vit D, 25-Hydroxy 06/14/2023 26.9 (L)  >29.9 ng/mL Final    Comment:    Reference Range:  Vitamin D status         Range   Deficiency              <20 ng/mL   Mild Deficiency       20-30 ng/mL   Sufficiency           ng/mL   Toxicity               >100 ng/mL      Magnesium 06/14/2023 1.9  1.6 - 2.6 mg/dL Final    Cholesterol 06/14/2023 170  <200 mg/dL Final    Comment:    Cholesterol Guidelines:      <200  Desirable   200-240  Borderline      >240  Undesirable         HDL 06/14/2023 50  >40 mg/dL Final    Comment:    HDL Guidelines:    <40     Undesirable   40-59    Borderline    >59     Desirable         LDL Cholesterol 06/14/2023 103  0 - 130 mg/dL Final    Comment:    LDL Guidelines:     <100    Desirable   100-129   Near to/above Desirable   130-159   Borderline      >159   Undesirable     Direct (measured) LDL and calculated LDL are not interchangeable tests. Chol/HDL Ratio 06/14/2023 3.4  <5 Final            Triglycerides 06/14/2023 84  <150 mg/dL Final    Comment:    Triglyceride Guidelines:     <150   Desirable   150-199  Borderline   200-499  High     >499   Very high   Based on AHA Guidelines for fasting triglyceride, October 2012. Hemoglobin A1C 06/14/2023 5.6  4.0 - 6.0 % Final    Estimated Avg Glucose 06/14/2023 114  mg/dL Final    Comment: The ADA and AACC recommend providing the estimated average glucose result to permit better   patient understanding of their HBA1c result. Glucose 06/14/2023 103 (H)  70 - 99 mg/dL Final    BUN 06/14/2023 10  8 - 23 mg/dL Final    Creatinine 06/14/2023 0.55  0.50 - 0.90 mg/dL Final    Est, Glom Filt Rate 06/14/2023 >60  >60 mL/min/1.73m2 Final    Comment:       These results are not intended for use in patients <25years of age.         eGFR results are calculated without a race factor using the 2021 CKD-EPI

## 2023-06-20 NOTE — PROGRESS NOTES
2023     Vlad Chawla (:  1950) is a 68 y.o. female, here for evaluation of the following medical concerns:    HPI  Patient comes in today for follow up of chronic health issues Patient overall seems to be doing relatively well. Has had some intermittent issues with her sinuses but feels that with doing sinus rinses she is able to keep this manageable. Feels that it is likely environmental irritants that are causing her sinuses to be flared. She does have a known history of hypertension and her blood pressure is stable and controlled on her current medical therapy. Has known hyperlipidemia and her cholesterol levels are stable and controlled with her current dietary regimen. Has known impaired fasting glucose which is stable with continued low-carb/low sugar diet. Has a known history of paroxysmal atrial fibrillation and she is stable from a cardiac standpoint. Does have known vitamin D deficiency but she has been intolerant to vitamin D supplements so at this point would just continue to monitor but her levels are still slightly low. Does have known hypomagnesemia but her magnesium level is adequately supplemented at this time. Patient otherwise has no other acute medical concerns. .  Patient's recent lab reports are as follows:    Results for orders placed or performed during the hospital encounter of 23   TSH   Result Value Ref Range    TSH 2.40 0.30 - 5.00 uIU/mL   Vitamin D 25 Hydroxy   Result Value Ref Range    Vit D, 25-Hydroxy 26.9 (L) >29.9 ng/mL   Magnesium   Result Value Ref Range    Magnesium 1.9 1.6 - 2.6 mg/dL   Lipid Panel   Result Value Ref Range    Cholesterol 170 <200 mg/dL    HDL 50 >40 mg/dL    LDL Cholesterol 103 0 - 130 mg/dL    Chol/HDL Ratio 3.4 <5    Triglycerides 84 <150 mg/dL   Hemoglobin A1C   Result Value Ref Range    Hemoglobin A1C 5.6 4.0 - 6.0 %    Estimated Avg Glucose 114 mg/dL   Comprehensive Metabolic Panel   Result Value Ref Range    Glucose 103

## 2023-06-20 NOTE — PROGRESS NOTES
Patient declines AWV. Declines tdap, shingles, and pneumonia vaccines. Declines cologuard/colon screening. Declines mammogram screening.

## 2023-06-23 ASSESSMENT — ENCOUNTER SYMPTOMS: RHINORRHEA: 1

## 2023-07-19 RX ORDER — VALACYCLOVIR HYDROCHLORIDE 500 MG/1
TABLET, FILM COATED ORAL
Qty: 6 TABLET | Refills: 0 | Status: SHIPPED | OUTPATIENT
Start: 2023-07-19

## 2023-07-19 NOTE — TELEPHONE ENCOUNTER
Dwyane Champion called requesting a refill of the below medication which has been pended for you:     Requested Prescriptions     Pending Prescriptions Disp Refills    valACYclovir (VALTREX) 500 MG tablet 6 tablet 2     Si tab bid       Last Appointment Date: 2023  Next Appointment Date: 2023    Allergies   Allergen Reactions    Amiodarone Shortness Of Breath     Authoring Clinician or Source System: Camacho Ohms  Respiratory difficulty    Amoxicillin Anaphylaxis    Beta Adrenergic Blockers Shortness Of Breath    Metoprolol Shortness Of Breath     \"Beta Blockers    Penicillin V Potassium Anaphylaxis    Lisinopril Other (See Comments)     Eyes were beating along with her heart beat/HTN    Dronedarone Other (See Comments)     Irregular Heart Beats  Authoring Clinician or Source System: Kyara Schmitt  ([employer])  Edema and irregular heartbeats    Epinephrine Other (See Comments)     dental admin gave her afib      Statins     Morphine Nausea Only, Nausea And Vomiting and Other (See Comments)     Nausea  Other reaction(s): GI Upset  Nausea    Nexium [Esomeprazole Magnesium] Anxiety     Heart flutters more than normal.    Proton Pump Inhibitors Nausea And Vomiting and Anxiety     Other reaction(s): GI Distress, GI Upset  Heart flutters more than normal.

## 2023-07-21 ENCOUNTER — E-VISIT (OUTPATIENT)
Dept: FAMILY MEDICINE CLINIC | Age: 73
End: 2023-07-21
Payer: MEDICARE

## 2023-07-21 DIAGNOSIS — J01.90 ACUTE BACTERIAL SINUSITIS: Primary | ICD-10-CM

## 2023-07-21 DIAGNOSIS — B96.89 ACUTE BACTERIAL SINUSITIS: Primary | ICD-10-CM

## 2023-07-21 PROCEDURE — 99421 OL DIG E/M SVC 5-10 MIN: CPT | Performed by: NURSE PRACTITIONER

## 2023-07-21 RX ORDER — CEFUROXIME AXETIL 500 MG/1
500 TABLET ORAL 2 TIMES DAILY
Qty: 14 TABLET | Refills: 0 | Status: SHIPPED | OUTPATIENT
Start: 2023-07-21 | End: 2023-07-28

## 2023-07-21 NOTE — PROGRESS NOTES
After reviewing symptoms and questionairre, pt will be treated for sinusitis. Prescription for ceftin sent in. 5 minutes spent reviewing chart and sending in prescription.

## 2023-07-26 DIAGNOSIS — F41.9 ANXIETY: ICD-10-CM

## 2023-07-27 RX ORDER — ALPRAZOLAM 0.25 MG/1
TABLET ORAL
Qty: 90 TABLET | Refills: 0 | Status: SHIPPED | OUTPATIENT
Start: 2023-07-27 | End: 2023-08-26

## 2023-07-27 NOTE — TELEPHONE ENCOUNTER
Dr Tre Wray patient. Dr Tre Wray is out of the office. Cony Manuel called requesting a refill of the below medication which has been pended for you: per [facility], last alprazolam fill 6/26/23 #90    Requested Prescriptions     Pending Prescriptions Disp Refills    ALPRAZolam (XANAX) 0.25 MG tablet [Pharmacy Med Name: ALPRAZOLAM 0.25MG TABLETS] 90 tablet      Sig: TAKE 1 TABLET BY MOUTH THREE TIMES DAILY AS NEEDED FOR ANXIETY.  MAX DAILY AMOUNT: 0.75 MG       Last Appointment Date: 6/20/2023  Next Appointment Date: 12/20/2023    Allergies   Allergen Reactions    Amiodarone Shortness Of Breath     Authoring Clinician or Source System: Cheyenne Hernandez  Respiratory difficulty    Amoxicillin Anaphylaxis    Beta Adrenergic Blockers Shortness Of Breath    Metoprolol Shortness Of Breath     \"Beta Blockers    Penicillin V Potassium Anaphylaxis    Lisinopril Other (See Comments)     Eyes were beating along with her heart beat/HTN    Dronedarone Other (See Comments)     Irregular Heart Beats  Authoring Clinician or Source System: Kyara Schmitt  ([employer])  Edema and irregular heartbeats    Epinephrine Other (See Comments)     dental admin gave her afib      Statins     Morphine Nausea Only, Nausea And Vomiting and Other (See Comments)     Nausea  Other reaction(s): GI Upset  Nausea    Nexium [Esomeprazole Magnesium] Anxiety     Heart flutters more than normal.    Proton Pump Inhibitors Nausea And Vomiting and Anxiety     Other reaction(s): GI Distress, GI Upset  Heart flutters more than normal.

## 2023-08-26 DIAGNOSIS — F41.9 ANXIETY: ICD-10-CM

## 2023-08-28 RX ORDER — ALPRAZOLAM 0.25 MG/1
0.25 TABLET ORAL 3 TIMES DAILY PRN
Qty: 90 TABLET | Refills: 0 | Status: SHIPPED | OUTPATIENT
Start: 2023-08-28 | End: 2023-09-27

## 2023-08-30 ENCOUNTER — E-VISIT (OUTPATIENT)
Dept: PRIMARY CARE CLINIC | Age: 73
End: 2023-08-30
Payer: MEDICARE

## 2023-08-30 DIAGNOSIS — J01.40 ACUTE NON-RECURRENT PANSINUSITIS: Primary | ICD-10-CM

## 2023-08-30 PROCEDURE — 99422 OL DIG E/M SVC 11-20 MIN: CPT | Performed by: FAMILY MEDICINE

## 2023-08-30 RX ORDER — CEPHALEXIN 500 MG/1
500 CAPSULE ORAL 3 TIMES DAILY
Qty: 30 CAPSULE | Refills: 0 | Status: SHIPPED | OUTPATIENT
Start: 2023-08-30

## 2023-08-30 ASSESSMENT — LIFESTYLE VARIABLES: SMOKING_STATUS: NO, I'VE NEVER SMOKED

## 2023-08-30 NOTE — PROGRESS NOTES
Patient with symptoms suggestive of acute sinusitis. Will treat with keflex as ordered and did recommend increased fluid intake. Advised if no improvement in her symptoms with treatment then she should come in for an in person evaluation. 11-20 minutes were spent on the digital evaluation and management of this patient.

## 2023-09-25 DIAGNOSIS — F41.9 ANXIETY: ICD-10-CM

## 2023-09-25 RX ORDER — ALPRAZOLAM 0.25 MG/1
0.25 TABLET ORAL 3 TIMES DAILY PRN
Qty: 90 TABLET | Refills: 0 | Status: SHIPPED | OUTPATIENT
Start: 2023-09-27 | End: 2023-10-27

## 2023-09-25 NOTE — TELEPHONE ENCOUNTER
Per [facility], last fill 08/28/23, quantity 90 for 30 days. Krissy Hillphilipp called requesting a refill of the below medication which has been pended for you:     Requested Prescriptions     Pending Prescriptions Disp Refills    ALPRAZolam (XANAX) 0.25 MG tablet 90 tablet 0     Sig: Take 1 tablet by mouth 3 times daily as needed for Sleep for up to 30 days.  Max Daily Amount: 0.75 mg       Last Appointment Date: 8/30/2023  Next Appointment Date: 12/20/2023    Allergies   Allergen Reactions    Amiodarone Shortness Of Breath     Authoring Clinician or Source System: Sridhar Ramirez  Respiratory difficulty    Amoxicillin Anaphylaxis    Beta Adrenergic Blockers Shortness Of Breath    Metoprolol Shortness Of Breath     \"Beta Blockers    Penicillin V Potassium Anaphylaxis    Lisinopril Other (See Comments)     Eyes were beating along with her heart beat/HTN    Dronedarone Other (See Comments)     Irregular Heart Beats  Authoring Clinician or Source System: Kyara Schmitt  ([employer])  Edema and irregular heartbeats    Epinephrine Other (See Comments)     dental admin gave her afib      Statins     Morphine Nausea Only, Nausea And Vomiting and Other (See Comments)     Nausea  Other reaction(s): GI Upset  Nausea    Nexium [Esomeprazole Magnesium] Anxiety     Heart flutters more than normal.    Proton Pump Inhibitors Nausea And Vomiting and Anxiety     Other reaction(s): GI Distress, GI Upset  Heart flutters more than normal.

## 2023-10-06 ENCOUNTER — TELEPHONE (OUTPATIENT)
Dept: FAMILY MEDICINE CLINIC | Age: 73
End: 2023-10-06

## 2023-10-06 NOTE — TELEPHONE ENCOUNTER
Advised pt Dr. Autumn Goodman does not have any immediate openings,  if she wants to see her the soonest would be [facility] clinic ([facility] next week. Patient was agreeable to wait and see her then. Appointment Request From: Halle Rasheed     With Provider: Smiley Sotelo DO [facility] A department of [facility]]     Preferred Date Range: 10/9/2023 - 10/13/2023     Preferred Times: Any Time     Reason for visit: Office Visit     Comments:  I have a discharge coming from my navel, and I want to see Dr. Autumn Goodman.

## 2023-10-16 RX ORDER — FAMOTIDINE 20 MG/1
20 TABLET, FILM COATED ORAL 2 TIMES DAILY
Qty: 180 TABLET | Refills: 0 | Status: SHIPPED | OUTPATIENT
Start: 2023-10-16

## 2023-10-16 NOTE — TELEPHONE ENCOUNTER
Last Appt:  7/21/2023  Next Appt:   12/20/2023  Med verified in [address] [place] [address] patient and takes once or twice a ay as needed

## 2023-11-20 DIAGNOSIS — F41.9 ANXIETY: ICD-10-CM

## 2023-11-20 RX ORDER — ALPRAZOLAM 0.25 MG/1
0.25 TABLET ORAL 3 TIMES DAILY PRN
Qty: 90 TABLET | Refills: 0 | Status: SHIPPED | OUTPATIENT
Start: 2023-11-20 | End: 2023-12-20

## 2023-11-20 NOTE — TELEPHONE ENCOUNTER
Per [facility], last fill 09/27/23, quantity 90 for 30 days. Saeed Mallory called requesting a refill of the below medication which has been pended for you:     Requested Prescriptions     Pending Prescriptions Disp Refills    ALPRAZolam (XANAX) 0.25 MG tablet 90 tablet 0     Sig: Take 1 tablet by mouth 3 times daily as needed for Sleep for up to 30 days.  Max Daily Amount: 0.75 mg       Last Appointment Date: 8/30/2023  Next Appointment Date: 12/13/2023    Allergies   Allergen Reactions    Amiodarone Shortness Of Breath     Authoring Clinician or Source System: Buzzy Brooms  Respiratory difficulty    Amoxicillin Anaphylaxis    Beta Adrenergic Blockers Shortness Of Breath    Metoprolol Shortness Of Breath     \"Beta Blockers    Penicillin V Potassium Anaphylaxis    Lisinopril Other (See Comments)     Eyes were beating along with her heart beat/HTN    Dronedarone Other (See Comments)     Irregular Heart Beats  Authoring Clinician or Source System: Kyara Schmitt  ([employer])  Edema and irregular heartbeats    Epinephrine Other (See Comments)     dental admin gave her afib      Statins     Morphine Nausea Only, Nausea And Vomiting and Other (See Comments)     Nausea  Other reaction(s): GI Upset  Nausea    Nexium [Esomeprazole Magnesium] Anxiety     Heart flutters more than normal.    Proton Pump Inhibitors Nausea And Vomiting and Anxiety     Other reaction(s): GI Distress, GI Upset  Heart flutters more than normal.

## 2023-12-11 ENCOUNTER — HOSPITAL ENCOUNTER (OUTPATIENT)
Age: 73
Discharge: HOME | End: 2023-12-11
Payer: MEDICARE

## 2023-12-11 DIAGNOSIS — E78.2 MIXED HYPERLIPIDEMIA: ICD-10-CM

## 2023-12-11 DIAGNOSIS — R73.01 IMPAIRED FASTING GLUCOSE: ICD-10-CM

## 2023-12-11 DIAGNOSIS — I10 ESSENTIAL HYPERTENSION: ICD-10-CM

## 2023-12-11 DIAGNOSIS — E55.9 VITAMIN D DEFICIENCY: ICD-10-CM

## 2023-12-11 DIAGNOSIS — E83.42 HYPOMAGNESEMIA: ICD-10-CM

## 2023-12-11 LAB
ALBUMIN SERPL-MCNC: 4.9 G/DL (ref 3.5–5.2)
ALBUMIN/GLOB SERPL: 1.4 {RATIO} (ref 1–2.5)
ALP SERPL-CCNC: 81 U/L (ref 35–104)
ALT SERPL-CCNC: 20 U/L (ref 5–33)
ANION GAP SERPL CALCULATED.3IONS-SCNC: 11 MMOL/L (ref 9–17)
AST SERPL-CCNC: 24 U/L
BASOPHILS # BLD: 0.05 K/UL (ref 0–0.2)
BASOPHILS NFR BLD: 1 % (ref 0–2)
BILIRUB SERPL-MCNC: 0.6 MG/DL (ref 0.3–1.2)
BUN SERPL-MCNC: 12 MG/DL (ref 8–23)
BUN/CREAT SERPL: 20 (ref 9–20)
CALCIUM SERPL-MCNC: 10 MG/DL (ref 8.6–10.4)
CHLORIDE SERPL-SCNC: 96 MMOL/L (ref 98–107)
CO2 SERPL-SCNC: 28 MMOL/L (ref 20–31)
CREAT SERPL-MCNC: 0.6 MG/DL (ref 0.5–0.9)
EOSINOPHIL # BLD: 0.07 K/UL (ref 0–0.44)
EOSINOPHILS RELATIVE PERCENT: 1 % (ref 1–4)
ERYTHROCYTE [DISTWIDTH] IN BLOOD BY AUTOMATED COUNT: 12 % (ref 11.8–14.4)
EST. AVERAGE GLUCOSE BLD GHB EST-MCNC: 108 MG/DL
GFR SERPL CREATININE-BSD FRML MDRD: >60 ML/MIN/1.73M2
GLUCOSE SERPL-MCNC: 99 MG/DL (ref 70–99)
HBA1C MFR BLD: 5.4 % (ref 4–6)
HCT VFR BLD AUTO: 44.3 % (ref 36.3–47.1)
HGB BLD-MCNC: 14.8 G/DL (ref 11.9–15.1)
IMM GRANULOCYTES # BLD AUTO: 0.04 K/UL (ref 0–0.3)
IMM GRANULOCYTES NFR BLD: 1 %
LYMPHOCYTES NFR BLD: 1.25 K/UL (ref 1.1–3.7)
LYMPHOCYTES RELATIVE PERCENT: 17 % (ref 24–43)
MAGNESIUM SERPL-MCNC: 2.2 MG/DL (ref 1.6–2.6)
MCH RBC QN AUTO: 31.8 PG (ref 25.2–33.5)
MCHC RBC AUTO-ENTMCNC: 33.4 G/DL (ref 25.2–33.5)
MCV RBC AUTO: 95.3 FL (ref 82.6–102.9)
MONOCYTES NFR BLD: 0.59 K/UL (ref 0.1–1.2)
MONOCYTES NFR BLD: 8 % (ref 3–12)
NEUTROPHILS NFR BLD: 72 % (ref 36–65)
NEUTS SEG NFR BLD: 5.2 K/UL (ref 1.5–8.1)
NRBC BLD-RTO: 0 PER 100 WBC
PLATELET # BLD AUTO: 289 K/UL (ref 138–453)
PMV BLD AUTO: 9 FL (ref 8.1–13.5)
POTASSIUM SERPL-SCNC: 4.7 MMOL/L (ref 3.7–5.3)
PROT SERPL-MCNC: 8.3 G/DL (ref 6.4–8.3)
RBC # BLD AUTO: 4.65 M/UL (ref 3.95–5.11)
SODIUM SERPL-SCNC: 135 MMOL/L (ref 135–144)
TSH SERPL DL<=0.05 MIU/L-ACNC: 1.76 UIU/ML (ref 0.3–5)
WBC OTHER # BLD: 7.2 K/UL (ref 3.5–11.3)

## 2023-12-11 PROCEDURE — 82306 VITAMIN D 25 HYDROXY: CPT

## 2023-12-11 PROCEDURE — 85025 COMPLETE CBC W/AUTO DIFF WBC: CPT

## 2023-12-11 PROCEDURE — 84443 ASSAY THYROID STIM HORMONE: CPT

## 2023-12-11 PROCEDURE — 80053 COMPREHEN METABOLIC PANEL: CPT

## 2023-12-11 PROCEDURE — 36415 COLL VENOUS BLD VENIPUNCTURE: CPT

## 2023-12-11 PROCEDURE — 83036 HEMOGLOBIN GLYCOSYLATED A1C: CPT

## 2023-12-11 PROCEDURE — 83735 ASSAY OF MAGNESIUM: CPT

## 2023-12-11 PROCEDURE — 80061 LIPID PANEL: CPT

## 2023-12-12 LAB
CHOLEST SERPL-MCNC: 190 MG/DL
CHOLESTEROL/HDL RATIO: 3.3
HDLC SERPL-MCNC: 57 MG/DL
LDLC SERPL CALC-MCNC: 116 MG/DL (ref 0–130)
TRIGL SERPL-MCNC: 83 MG/DL

## 2023-12-13 ENCOUNTER — OFFICE VISIT (OUTPATIENT)
Dept: FAMILY MEDICINE CLINIC | Age: 73
End: 2023-12-13
Payer: MEDICARE

## 2023-12-13 VITALS
DIASTOLIC BLOOD PRESSURE: 84 MMHG | BODY MASS INDEX: 22.26 KG/M2 | RESPIRATION RATE: 16 BRPM | TEMPERATURE: 97.2 F | OXYGEN SATURATION: 95 % | HEART RATE: 76 BPM | HEIGHT: 62 IN | SYSTOLIC BLOOD PRESSURE: 138 MMHG | WEIGHT: 121 LBS

## 2023-12-13 DIAGNOSIS — E78.2 MIXED HYPERLIPIDEMIA: ICD-10-CM

## 2023-12-13 DIAGNOSIS — J84.10 LUNG GRANULOMA (HCC): ICD-10-CM

## 2023-12-13 DIAGNOSIS — I10 ESSENTIAL HYPERTENSION: Primary | ICD-10-CM

## 2023-12-13 DIAGNOSIS — I48.0 PAROXYSMAL ATRIAL FIBRILLATION (HCC): ICD-10-CM

## 2023-12-13 DIAGNOSIS — R73.01 IMPAIRED FASTING GLUCOSE: ICD-10-CM

## 2023-12-13 DIAGNOSIS — E87.1 HYPONATREMIA: ICD-10-CM

## 2023-12-13 DIAGNOSIS — E83.42 HYPOMAGNESEMIA: ICD-10-CM

## 2023-12-13 DIAGNOSIS — E55.9 VITAMIN D DEFICIENCY: ICD-10-CM

## 2023-12-13 DIAGNOSIS — F41.9 ANXIETY: ICD-10-CM

## 2023-12-13 DIAGNOSIS — E87.6 HYPOKALEMIA: ICD-10-CM

## 2023-12-13 LAB — 25(OH)D3 SERPL-MCNC: 27.9 NG/ML (ref 30–100)

## 2023-12-13 PROCEDURE — 1090F PRES/ABSN URINE INCON ASSESS: CPT | Performed by: FAMILY MEDICINE

## 2023-12-13 PROCEDURE — G8484 FLU IMMUNIZE NO ADMIN: HCPCS | Performed by: FAMILY MEDICINE

## 2023-12-13 PROCEDURE — 99214 OFFICE O/P EST MOD 30 MIN: CPT | Performed by: FAMILY MEDICINE

## 2023-12-13 PROCEDURE — 3017F COLORECTAL CA SCREEN DOC REV: CPT | Performed by: FAMILY MEDICINE

## 2023-12-13 PROCEDURE — G8399 PT W/DXA RESULTS DOCUMENT: HCPCS | Performed by: FAMILY MEDICINE

## 2023-12-13 PROCEDURE — G8420 CALC BMI NORM PARAMETERS: HCPCS | Performed by: FAMILY MEDICINE

## 2023-12-13 PROCEDURE — 3075F SYST BP GE 130 - 139MM HG: CPT | Performed by: FAMILY MEDICINE

## 2023-12-13 PROCEDURE — 1036F TOBACCO NON-USER: CPT | Performed by: FAMILY MEDICINE

## 2023-12-13 PROCEDURE — 3079F DIAST BP 80-89 MM HG: CPT | Performed by: FAMILY MEDICINE

## 2023-12-13 PROCEDURE — 1123F ACP DISCUSS/DSCN MKR DOCD: CPT | Performed by: FAMILY MEDICINE

## 2023-12-13 PROCEDURE — G8427 DOCREV CUR MEDS BY ELIG CLIN: HCPCS | Performed by: FAMILY MEDICINE

## 2023-12-13 RX ORDER — ALPRAZOLAM 0.25 MG/1
0.25 TABLET ORAL 3 TIMES DAILY PRN
Qty: 90 TABLET | Refills: 0 | Status: SHIPPED | OUTPATIENT
Start: 2023-12-13 | End: 2024-01-12

## 2023-12-13 ASSESSMENT — ENCOUNTER SYMPTOMS
SORE THROAT: 0
NAUSEA: 0
DIARRHEA: 0
VOMITING: 0
EYE DISCHARGE: 0
WHEEZING: 0
ABDOMINAL PAIN: 1
SHORTNESS OF BREATH: 0
TROUBLE SWALLOWING: 0
CONSTIPATION: 0
EYE REDNESS: 0
RHINORRHEA: 1
SINUS PRESSURE: 0
COUGH: 0

## 2023-12-13 NOTE — PROGRESS NOTES
Patient declines mammogram and cologuard screenings. Declines flu, covid, tdap, shingles, RSV, PCV vaccines. Declines AWV.
Efforts were made to edit the dictations but occasionally words are mis-transcribed.)      No follow-ups on file. An electronic signature was used to authenticate this note.     --Maria D Lucio DO on 12/13/2023 at 6:53 AM

## 2023-12-13 NOTE — PATIENT INSTRUCTIONS
is recommended, particularly when comparing to results   calculated using previous equations. The CKD-EPI equation is less accurate in patients with extremes of muscle mass, extra-renal   metabolism of creatine, excessive creatine ingestion, or following therapy that affects   renal tubular secretion.       Bun/Cre Ratio 12/11/2023 20  9 - 20 Final    Calcium 12/11/2023 10.0  8.6 - 10.4 mg/dL Final    Total Protein 12/11/2023 8.3  6.4 - 8.3 g/dL Final    Albumin 12/11/2023 4.9  3.5 - 5.2 g/dL Final    Albumin/Globulin Ratio 12/11/2023 1.4  1.0 - 2.5 Final    Total Bilirubin 12/11/2023 0.6  0.3 - 1.2 mg/dL Final    Alkaline Phosphatase 12/11/2023 81  35 - 104 U/L Final    ALT 12/11/2023 20  5 - 33 U/L Final    AST 12/11/2023 24  <32 U/L Final    WBC 12/11/2023 7.2  3.5 - 11.3 k/uL Final    RBC 12/11/2023 4.65  3.95 - 5.11 m/uL Final    Hemoglobin 12/11/2023 14.8  11.9 - 15.1 g/dL Final    Hematocrit 12/11/2023 44.3  36.3 - 47.1 % Final    MCV 12/11/2023 95.3  82.6 - 102.9 fL Final    MCH 12/11/2023 31.8  25.2 - 33.5 pg Final    MCHC 12/11/2023 33.4  25.2 - 33.5 g/dL Final    RDW 12/11/2023 12.0  11.8 - 14.4 % Final    Platelets 98/93/7469 289  138 - 453 k/uL Final    MPV 12/11/2023 9.0  8.1 - 13.5 fL Final    NRBC Automated 12/11/2023 0.0  0.0 per 100 WBC Final    Neutrophils % 12/11/2023 72 (H)  36 - 65 % Final    Lymphocytes % 12/11/2023 17 (L)  24 - 43 % Final    Monocytes % 12/11/2023 8  3 - 12 % Final    Eosinophils % 12/11/2023 1  1 - 4 % Final    Basophils % 12/11/2023 1  0 - 2 % Final    Immature Granulocytes 12/11/2023 1 (H)  0 % Final    Neutrophils Absolute 12/11/2023 5.20  1.50 - 8.10 k/uL Final    Lymphocytes Absolute 12/11/2023 1.25  1.10 - 3.70 k/uL Final    Monocytes Absolute 12/11/2023 0.59  0.10 - 1.20 k/uL Final    Eosinophils Absolute 12/11/2023 0.07  0.00 - 0.44 k/uL Final    Basophils Absolute 12/11/2023 0.05  0.00 - 0.20 k/uL Final    Absolute Immature Granulocyte 12/11/2023 0.04  0.00 - 0.30

## 2024-01-23 DIAGNOSIS — F41.9 ANXIETY: ICD-10-CM

## 2024-01-23 RX ORDER — FAMOTIDINE 20 MG/1
20 TABLET, FILM COATED ORAL 2 TIMES DAILY
Qty: 180 TABLET | Refills: 1 | Status: SHIPPED | OUTPATIENT
Start: 2024-01-23

## 2024-01-23 RX ORDER — ALPRAZOLAM 0.25 MG/1
TABLET ORAL
Qty: 90 TABLET | Refills: 0 | Status: SHIPPED | OUTPATIENT
Start: 2024-01-23 | End: 2024-02-22

## 2024-01-23 NOTE — TELEPHONE ENCOUNTER
Sheryl called requesting a refill of the below medication which has been pended for you:     Requested Prescriptions     Pending Prescriptions Disp Refills    famotidine (PEPCID) 20 MG tablet 180 tablet 1     Sig: Take 1 tablet by mouth 2 times daily       Last Appointment Date: 12/13/2023  Next Appointment Date: 6/13/2024    Allergies   Allergen Reactions    Amiodarone Shortness Of Breath     Authoring Clinician or Source System: Dharmesh Pelayo  Respiratory difficulty    Amoxicillin Anaphylaxis    Beta Adrenergic Blockers Shortness Of Breath    Metoprolol Shortness Of Breath     \"Beta Blockers    Penicillin V Potassium Anaphylaxis    Lisinopril Other (See Comments)     Eyes were beating along with her heart beat/HTN    Dronedarone Other (See Comments)     Irregular Heart Beats  Authoring Clinician or Source System: Kyara Schmitt  ([employer])  Edema and irregular heartbeats    Epinephrine Other (See Comments)     dental admin gave her afib      Statins     Morphine Nausea Only, Nausea And Vomiting and Other (See Comments)     Nausea  Other reaction(s): GI Upset  Nausea    Nexium [Esomeprazole Magnesium] Anxiety     Heart flutters more than normal.    Proton Pump Inhibitors Nausea And Vomiting and Anxiety     Other reaction(s): GI Distress, GI Upset  Heart flutters more than normal.

## 2024-01-23 NOTE — TELEPHONE ENCOUNTER
Per [facility], last fill 12/21/23, quantity 90 for 30 days.   Sheryl called requesting a refill of the below medication which has been pended for you:     Requested Prescriptions     Pending Prescriptions Disp Refills    ALPRAZolam (XANAX) 0.25 MG tablet [Pharmacy Med Name: ALPRAZolam Oral Tablet 0.25 MG] 90 tablet 0     Sig: Take 1 tablet by mouth 3 times daily as needed for Sleep       Last Appointment Date: 12/13/2023  Next Appointment Date: 6/13/2024    Allergies   Allergen Reactions    Amiodarone Shortness Of Breath     Authoring Clinician or Source System: Dharmesh Pelayo  Respiratory difficulty    Amoxicillin Anaphylaxis    Beta Adrenergic Blockers Shortness Of Breath    Metoprolol Shortness Of Breath     \"Beta Blockers    Penicillin V Potassium Anaphylaxis    Lisinopril Other (See Comments)     Eyes were beating along with her heart beat/HTN    Dronedarone Other (See Comments)     Irregular Heart Beats  Authoring Clinician or Source System: Kyara Schmitt  ([employer])  Edema and irregular heartbeats    Epinephrine Other (See Comments)     dental admin gave her afib      Statins     Morphine Nausea Only, Nausea And Vomiting and Other (See Comments)     Nausea  Other reaction(s): GI Upset  Nausea    Nexium [Esomeprazole Magnesium] Anxiety     Heart flutters more than normal.    Proton Pump Inhibitors Nausea And Vomiting and Anxiety     Other reaction(s): GI Distress, GI Upset  Heart flutters more than normal.

## 2024-03-17 DIAGNOSIS — F41.9 ANXIETY: ICD-10-CM

## 2024-03-19 RX ORDER — ALPRAZOLAM 0.25 MG/1
TABLET ORAL
Qty: 90 TABLET | Refills: 0 | Status: SHIPPED | OUTPATIENT
Start: 2024-03-19 | End: 2024-04-18

## 2024-04-09 ENCOUNTER — HOSPITAL ENCOUNTER (OUTPATIENT)
Dept: GENERAL RADIOLOGY | Age: 74
Discharge: HOME | End: 2024-04-11
Payer: MEDICARE

## 2024-04-09 ENCOUNTER — OFFICE VISIT (OUTPATIENT)
Dept: FAMILY MEDICINE CLINIC | Age: 74
End: 2024-04-09
Payer: MEDICARE

## 2024-04-09 VITALS
RESPIRATION RATE: 16 BRPM | BODY MASS INDEX: 22.26 KG/M2 | WEIGHT: 121 LBS | DIASTOLIC BLOOD PRESSURE: 78 MMHG | OXYGEN SATURATION: 94 % | HEIGHT: 62 IN | SYSTOLIC BLOOD PRESSURE: 138 MMHG | TEMPERATURE: 97.5 F | HEART RATE: 72 BPM

## 2024-04-09 DIAGNOSIS — Z87.81 HISTORY OF COMPRESSION FRACTURE OF SPINE: ICD-10-CM

## 2024-04-09 DIAGNOSIS — M54.50 CHRONIC MIDLINE LOW BACK PAIN WITHOUT SCIATICA: ICD-10-CM

## 2024-04-09 DIAGNOSIS — M54.6 ACUTE RIGHT-SIDED THORACIC BACK PAIN: Primary | ICD-10-CM

## 2024-04-09 DIAGNOSIS — G89.29 CHRONIC MIDLINE LOW BACK PAIN WITHOUT SCIATICA: ICD-10-CM

## 2024-04-09 DIAGNOSIS — M54.6 ACUTE RIGHT-SIDED THORACIC BACK PAIN: ICD-10-CM

## 2024-04-09 DIAGNOSIS — M54.2 NECK PAIN: ICD-10-CM

## 2024-04-09 PROCEDURE — 3017F COLORECTAL CA SCREEN DOC REV: CPT | Performed by: FAMILY MEDICINE

## 2024-04-09 PROCEDURE — 99214 OFFICE O/P EST MOD 30 MIN: CPT | Performed by: FAMILY MEDICINE

## 2024-04-09 PROCEDURE — G8420 CALC BMI NORM PARAMETERS: HCPCS | Performed by: FAMILY MEDICINE

## 2024-04-09 PROCEDURE — 1123F ACP DISCUSS/DSCN MKR DOCD: CPT | Performed by: FAMILY MEDICINE

## 2024-04-09 PROCEDURE — 3078F DIAST BP <80 MM HG: CPT | Performed by: FAMILY MEDICINE

## 2024-04-09 PROCEDURE — G8399 PT W/DXA RESULTS DOCUMENT: HCPCS | Performed by: FAMILY MEDICINE

## 2024-04-09 PROCEDURE — 3075F SYST BP GE 130 - 139MM HG: CPT | Performed by: FAMILY MEDICINE

## 2024-04-09 PROCEDURE — G8427 DOCREV CUR MEDS BY ELIG CLIN: HCPCS | Performed by: FAMILY MEDICINE

## 2024-04-09 PROCEDURE — 1090F PRES/ABSN URINE INCON ASSESS: CPT | Performed by: FAMILY MEDICINE

## 2024-04-09 PROCEDURE — 1036F TOBACCO NON-USER: CPT | Performed by: FAMILY MEDICINE

## 2024-04-09 PROCEDURE — 72072 X-RAY EXAM THORAC SPINE 3VWS: CPT

## 2024-04-09 RX ORDER — METAXALONE 800 MG/1
800 TABLET ORAL 3 TIMES DAILY
Qty: 30 TABLET | Refills: 0 | Status: SHIPPED | OUTPATIENT
Start: 2024-04-09 | End: 2024-04-19

## 2024-04-09 ASSESSMENT — PATIENT HEALTH QUESTIONNAIRE - PHQ9
SUM OF ALL RESPONSES TO PHQ QUESTIONS 1-9: 0
1. LITTLE INTEREST OR PLEASURE IN DOING THINGS: NOT AT ALL
2. FEELING DOWN, DEPRESSED OR HOPELESS: NOT AT ALL
SUM OF ALL RESPONSES TO PHQ QUESTIONS 1-9: 0
SUM OF ALL RESPONSES TO PHQ QUESTIONS 1-9: 0
SUM OF ALL RESPONSES TO PHQ9 QUESTIONS 1 & 2: 0
SUM OF ALL RESPONSES TO PHQ QUESTIONS 1-9: 0

## 2024-04-09 ASSESSMENT — ENCOUNTER SYMPTOMS: BACK PAIN: 1
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
## 2024-04-10 ENCOUNTER — TELEPHONE (OUTPATIENT)
Dept: FAMILY MEDICINE CLINIC | Age: 74
End: 2024-04-10

## 2024-04-16 ENCOUNTER — HOSPITAL ENCOUNTER (OUTPATIENT)
Dept: CT IMAGING | Age: 74
Discharge: HOME | End: 2024-04-18
Attending: FAMILY MEDICINE
Payer: MEDICARE

## 2024-04-16 DIAGNOSIS — Z87.81 HISTORY OF COMPRESSION FRACTURE OF SPINE: ICD-10-CM

## 2024-04-16 DIAGNOSIS — M54.6 ACUTE RIGHT-SIDED THORACIC BACK PAIN: ICD-10-CM

## 2024-04-16 PROCEDURE — 72128 CT CHEST SPINE W/O DYE: CPT

## 2024-04-19 ENCOUNTER — TELEPHONE (OUTPATIENT)
Dept: FAMILY MEDICINE CLINIC | Age: 74
End: 2024-04-19

## 2024-04-19 DIAGNOSIS — R91.8 GROUND GLASS OPACITY PRESENT ON IMAGING OF LUNG: Primary | ICD-10-CM

## 2024-04-19 RX ORDER — BACLOFEN 5 MG/1
5 TABLET ORAL 3 TIMES DAILY PRN
Qty: 30 TABLET | Refills: 1 | Status: SHIPPED | OUTPATIENT
Start: 2024-04-19

## 2024-04-19 NOTE — PROGRESS NOTES
Order placed for CT chest with contrast per instruction from Dr Purvis on CT thoracic done on 4/16/2024

## 2024-04-19 NOTE — TELEPHONE ENCOUNTER
Patient was unable to tolerate the Skelaxin as it gave her insomnia. She wonders if you could send something else in for her to try for the pain and muscle tightness to back. Patient declined pain management consult for hypoplasty at this time.

## 2024-04-19 NOTE — TELEPHONE ENCOUNTER
Has history of histoplasmosis and after review did have evidence of histoplasmosis and had similar findings on CT in 2020.  Did send in baclofen for her to try

## 2024-05-16 DIAGNOSIS — F41.9 ANXIETY: ICD-10-CM

## 2024-05-16 RX ORDER — ALPRAZOLAM 0.25 MG/1
0.25 TABLET ORAL 3 TIMES DAILY PRN
Qty: 90 TABLET | Refills: 0 | Status: SHIPPED | OUTPATIENT
Start: 2024-05-16 | End: 2024-06-15

## 2024-05-16 NOTE — TELEPHONE ENCOUNTER
Sheryl called requesting a refill of the below medication which has been pended for you: last filled 3/19/2024 #90    Requested Prescriptions     Pending Prescriptions Disp Refills    ALPRAZolam (XANAX) 0.25 MG tablet [Pharmacy Med Name: ALPRAZolam Oral Tablet 0.25 MG] 90 tablet 0     Sig: Take 1 tablet by mouth 3 times daily as needed for Sleep       Last Appointment Date: 4/9/2024  Next Appointment Date: 6/13/2024    Allergies   Allergen Reactions    Amiodarone Shortness Of Breath     Authoring Clinician or Source System: Dharmesh Pelayo  Respiratory difficulty    Amoxicillin Anaphylaxis    Beta Adrenergic Blockers Shortness Of Breath    Metoprolol Shortness Of Breath     \"Beta Blockers    Penicillin V Potassium Anaphylaxis    Lisinopril Other (See Comments)     Eyes were beating along with her heart beat/HTN    Dronedarone Other (See Comments)     Irregular Heart Beats  Authoring Clinician or Source System: Kyara Schmitt  ([employer])  Edema and irregular heartbeats    Epinephrine Other (See Comments)     dental admin gave her afib      Statins     Morphine Nausea Only, Nausea And Vomiting and Other (See Comments)     Nausea  Other reaction(s): GI Upset  Nausea    Nexium [Esomeprazole Magnesium] Anxiety     Heart flutters more than normal.    Proton Pump Inhibitors Nausea And Vomiting and Anxiety     Other reaction(s): GI Distress, GI Upset  Heart flutters more than normal.

## 2024-05-16 NOTE — TELEPHONE ENCOUNTER
Medication refilled    Controlled Substance Monitoring:    Acute and Chronic Pain Monitoring:   RX Monitoring Periodic Controlled Substance Monitoring   5/16/2024   3:14 PM No signs of potential drug abuse or diversion identified.;Obtaining appropriate analgesic effect of treatment.

## 2024-06-05 ENCOUNTER — HOSPITAL ENCOUNTER (OUTPATIENT)
Age: 74
Discharge: HOME | End: 2024-06-05
Payer: MEDICARE

## 2024-06-05 DIAGNOSIS — E55.9 VITAMIN D DEFICIENCY: ICD-10-CM

## 2024-06-05 DIAGNOSIS — I10 ESSENTIAL HYPERTENSION: ICD-10-CM

## 2024-06-05 DIAGNOSIS — R73.01 IMPAIRED FASTING GLUCOSE: ICD-10-CM

## 2024-06-05 DIAGNOSIS — E78.2 MIXED HYPERLIPIDEMIA: ICD-10-CM

## 2024-06-05 DIAGNOSIS — E83.42 HYPOMAGNESEMIA: ICD-10-CM

## 2024-06-05 LAB
25(OH)D3 SERPL-MCNC: 25.9 NG/ML (ref 30–100)
ALBUMIN SERPL-MCNC: 4.7 G/DL (ref 3.5–5.2)
ALBUMIN/GLOB SERPL: 1.5 {RATIO} (ref 1–2.5)
ALP SERPL-CCNC: 117 U/L (ref 35–104)
ALT SERPL-CCNC: 16 U/L (ref 5–33)
ANION GAP SERPL CALCULATED.3IONS-SCNC: 9 MMOL/L (ref 9–17)
AST SERPL-CCNC: 21 U/L
BASOPHILS # BLD: 0.06 K/UL (ref 0–0.2)
BASOPHILS NFR BLD: 1 % (ref 0–2)
BILIRUB SERPL-MCNC: 0.5 MG/DL (ref 0.3–1.2)
BUN SERPL-MCNC: 10 MG/DL (ref 8–23)
BUN/CREAT SERPL: 17 (ref 9–20)
CALCIUM SERPL-MCNC: 9.4 MG/DL (ref 8.6–10.4)
CHLORIDE SERPL-SCNC: 95 MMOL/L (ref 98–107)
CHOLEST SERPL-MCNC: 165 MG/DL (ref 0–199)
CHOLESTEROL/HDL RATIO: 3
CO2 SERPL-SCNC: 27 MMOL/L (ref 20–31)
CREAT SERPL-MCNC: 0.6 MG/DL (ref 0.5–0.9)
EOSINOPHIL # BLD: 0.1 K/UL (ref 0–0.44)
EOSINOPHILS RELATIVE PERCENT: 1 % (ref 1–4)
ERYTHROCYTE [DISTWIDTH] IN BLOOD BY AUTOMATED COUNT: 12.9 % (ref 11.8–14.4)
EST. AVERAGE GLUCOSE BLD GHB EST-MCNC: 120 MG/DL
GFR, ESTIMATED: >90 ML/MIN/1.73M2
GLUCOSE SERPL-MCNC: 103 MG/DL (ref 70–99)
HBA1C MFR BLD: 5.8 % (ref 4–6)
HCT VFR BLD AUTO: 41.6 % (ref 36.3–47.1)
HDLC SERPL-MCNC: 63 MG/DL
HGB BLD-MCNC: 14 G/DL (ref 11.9–15.1)
IMM GRANULOCYTES # BLD AUTO: 0.06 K/UL (ref 0–0.3)
IMM GRANULOCYTES NFR BLD: 1 %
LDLC SERPL CALC-MCNC: 84 MG/DL (ref 0–100)
LYMPHOCYTES NFR BLD: 1.78 K/UL (ref 1.1–3.7)
LYMPHOCYTES RELATIVE PERCENT: 19 % (ref 24–43)
MAGNESIUM SERPL-MCNC: 2.1 MG/DL (ref 1.6–2.6)
MCH RBC QN AUTO: 30.8 PG (ref 25.2–33.5)
MCHC RBC AUTO-ENTMCNC: 33.7 G/DL (ref 25.2–33.5)
MCV RBC AUTO: 91.6 FL (ref 82.6–102.9)
MONOCYTES NFR BLD: 0.79 K/UL (ref 0.1–1.2)
MONOCYTES NFR BLD: 9 % (ref 3–12)
NEUTROPHILS NFR BLD: 69 % (ref 36–65)
NEUTS SEG NFR BLD: 6.49 K/UL (ref 1.5–8.1)
NRBC BLD-RTO: 0 PER 100 WBC
PLATELET # BLD AUTO: 252 K/UL (ref 138–453)
PMV BLD AUTO: 8.6 FL (ref 8.1–13.5)
POTASSIUM SERPL-SCNC: 4.6 MMOL/L (ref 3.7–5.3)
PROT SERPL-MCNC: 7.9 G/DL (ref 6.4–8.3)
RBC # BLD AUTO: 4.54 M/UL (ref 3.95–5.11)
SODIUM SERPL-SCNC: 131 MMOL/L (ref 135–144)
TRIGL SERPL-MCNC: 90 MG/DL
VLDLC SERPL CALC-MCNC: 18 MG/DL
WBC OTHER # BLD: 9.3 K/UL (ref 3.5–11.3)

## 2024-06-05 PROCEDURE — 80053 COMPREHEN METABOLIC PANEL: CPT

## 2024-06-05 PROCEDURE — 83036 HEMOGLOBIN GLYCOSYLATED A1C: CPT

## 2024-06-05 PROCEDURE — 85025 COMPLETE CBC W/AUTO DIFF WBC: CPT

## 2024-06-05 PROCEDURE — 83735 ASSAY OF MAGNESIUM: CPT

## 2024-06-05 PROCEDURE — 82306 VITAMIN D 25 HYDROXY: CPT

## 2024-06-05 PROCEDURE — 36415 COLL VENOUS BLD VENIPUNCTURE: CPT

## 2024-06-05 PROCEDURE — 80061 LIPID PANEL: CPT

## 2024-06-08 NOTE — TELEPHONE ENCOUNTER
Per [facility], last filled 7/9/2020, #90/30 days      Raven Nevarez called requesting a refill of the below medication which has been pended for you:     Requested Prescriptions      No prescriptions requested or ordered in this encounter       Last Appointment Date: 6/15/2020  Next Appointment Date: 12/16/2020    Allergies   Allergen Reactions    Amiodarone Shortness Of Breath     Authoring Clinician or Source System: Abbie Parks  Respiratory difficulty    Amoxicillin Anaphylaxis    Beta Adrenergic Blockers Shortness Of Breath    Metoprolol Shortness Of Breath     \"Beta Blockers    Penicillin V Potassium Anaphylaxis    Dronedarone Other (See Comments)     Irregular Heart Beats  Authoring Clinician or Source System: Kyara Schmitt  ([employer])  Edema and irregular heartbeats    Epinephrine Other (See Comments)     dental admin gave her afib  Dental admin gave her AFIB    Lisinopril Other (See Comments)     Eyes were beating along with her heart beat  Eyes were beating along with her heart beat    Statins     Morphine Nausea Only and Nausea And Vomiting     Nausea    Nexium [Esomeprazole Magnesium] Anxiety     Heart flutters more than normal.    Proton Pump Inhibitors Nausea And Vomiting No

## 2024-06-13 ENCOUNTER — OFFICE VISIT (OUTPATIENT)
Dept: FAMILY MEDICINE CLINIC | Age: 74
End: 2024-06-13

## 2024-06-13 VITALS
HEART RATE: 50 BPM | DIASTOLIC BLOOD PRESSURE: 80 MMHG | BODY MASS INDEX: 21.71 KG/M2 | TEMPERATURE: 97.2 F | HEIGHT: 62 IN | WEIGHT: 118 LBS | SYSTOLIC BLOOD PRESSURE: 138 MMHG | OXYGEN SATURATION: 98 % | RESPIRATION RATE: 18 BRPM

## 2024-06-13 DIAGNOSIS — I48.0 PAROXYSMAL ATRIAL FIBRILLATION (HCC): ICD-10-CM

## 2024-06-13 DIAGNOSIS — E83.42 HYPOMAGNESEMIA: ICD-10-CM

## 2024-06-13 DIAGNOSIS — J84.10 LUNG GRANULOMA (HCC): ICD-10-CM

## 2024-06-13 DIAGNOSIS — F41.9 ANXIETY: ICD-10-CM

## 2024-06-13 DIAGNOSIS — E78.2 MIXED HYPERLIPIDEMIA: ICD-10-CM

## 2024-06-13 DIAGNOSIS — I10 ESSENTIAL HYPERTENSION: Primary | ICD-10-CM

## 2024-06-13 DIAGNOSIS — E55.9 VITAMIN D DEFICIENCY: ICD-10-CM

## 2024-06-13 DIAGNOSIS — R73.01 IMPAIRED FASTING GLUCOSE: ICD-10-CM

## 2024-06-13 RX ORDER — ALPRAZOLAM 0.25 MG/1
0.25 TABLET ORAL 3 TIMES DAILY PRN
Qty: 90 TABLET | Refills: 0 | Status: SHIPPED | OUTPATIENT
Start: 2024-06-13 | End: 2024-07-13

## 2024-06-13 ASSESSMENT — ENCOUNTER SYMPTOMS
SHORTNESS OF BREATH: 0
DIARRHEA: 0
EYE DISCHARGE: 0
WHEEZING: 0
COUGH: 0
ABDOMINAL PAIN: 0
RHINORRHEA: 0
VOMITING: 0
CONSTIPATION: 0
BACK PAIN: 1
NAUSEA: 0
SORE THROAT: 0
EYE REDNESS: 0
TROUBLE SWALLOWING: 0
SINUS PRESSURE: 0

## 2024-06-13 NOTE — PATIENT INSTRUCTIONS
Hospital Outpatient Visit on 06/05/2024   Component Date Value Ref Range Status    WBC 06/05/2024 9.3  3.5 - 11.3 k/uL Final    RBC 06/05/2024 4.54  3.95 - 5.11 m/uL Final    Hemoglobin 06/05/2024 14.0  11.9 - 15.1 g/dL Final    Hematocrit 06/05/2024 41.6  36.3 - 47.1 % Final    MCV 06/05/2024 91.6  82.6 - 102.9 fL Final    MCH 06/05/2024 30.8  25.2 - 33.5 pg Final    MCHC 06/05/2024 33.7 (H)  25.2 - 33.5 g/dL Final    RDW 06/05/2024 12.9  11.8 - 14.4 % Final    Platelets 06/05/2024 252  138 - 453 k/uL Final    MPV 06/05/2024 8.6  8.1 - 13.5 fL Final    NRBC Automated 06/05/2024 0.0  0.0 per 100 WBC Final    Neutrophils % 06/05/2024 69 (H)  36 - 65 % Final    Lymphocytes % 06/05/2024 19 (L)  24 - 43 % Final    Monocytes % 06/05/2024 9  3 - 12 % Final    Eosinophils % 06/05/2024 1  1 - 4 % Final    Basophils % 06/05/2024 1  0 - 2 % Final    Immature Granulocytes % 06/05/2024 1 (H)  0 % Final    Neutrophils Absolute 06/05/2024 6.49  1.50 - 8.10 k/uL Final    Lymphocytes Absolute 06/05/2024 1.78  1.10 - 3.70 k/uL Final    Monocytes Absolute 06/05/2024 0.79  0.10 - 1.20 k/uL Final    Eosinophils Absolute 06/05/2024 0.10  0.00 - 0.44 k/uL Final    Basophils Absolute 06/05/2024 0.06  0.00 - 0.20 k/uL Final    Immature Granulocytes Absolute 06/05/2024 0.06  0.00 - 0.30 k/uL Final    Sodium 06/05/2024 131 (L)  135 - 144 mmol/L Final    Potassium 06/05/2024 4.6  3.7 - 5.3 mmol/L Final    Chloride 06/05/2024 95 (L)  98 - 107 mmol/L Final    CO2 06/05/2024 27  20 - 31 mmol/L Final    Anion Gap 06/05/2024 9  9 - 17 mmol/L Final    Glucose 06/05/2024 103 (H)  70 - 99 mg/dL Final    BUN 06/05/2024 10  8 - 23 mg/dL Final    Creatinine 06/05/2024 0.6  0.5 - 0.9 mg/dL Final    Est, Glom Filt Rate 06/05/2024 >90  >60 mL/min/1.73m2 Final    Comment:       These results are not intended for use in patients <18 years of age.        eGFR results are calculated without a race factor using the 2021 CKD-EPI equation.  Careful clinical

## 2024-06-13 NOTE — PROGRESS NOTES
Patient declines covid, RSV, pneumonia, tdap, and shingles vaccines. Declines cologuard screening. Declines mammogram screening. Declines MAWV.   
reactive to light.   Neck:      Thyroid: No thyromegaly.   Cardiovascular:      Rate and Rhythm: Normal rate. Rhythm irregular.      Heart sounds: Murmur heard.   Pulmonary:      Effort: Pulmonary effort is normal.      Breath sounds: Normal breath sounds. No wheezing or rales.   Abdominal:      General: Bowel sounds are normal. There is no distension.      Palpations: Abdomen is soft.      Tenderness: There is no abdominal tenderness.   Musculoskeletal:      Cervical back: Normal range of motion and neck supple.   Lymphadenopathy:      Cervical: No cervical adenopathy.   Skin:     General: Skin is warm and dry.      Findings: No rash.   Neurological:      Mental Status: She is alert and oriented to person, place, and time.   Psychiatric:         Behavior: Behavior normal.         Thought Content: Thought content normal.         Judgment: Judgment normal.           ASSESSMENT/PLAN:  Encounter Diagnoses   Name Primary?    Essential hypertension Yes    Mixed hyperlipidemia     Impaired fasting glucose     Hypomagnesemia     Anxiety     Paroxysmal atrial fibrillation (HCC)     Lung granuloma (HCC)     Vitamin D deficiency      Orders Placed This Encounter   Medications    ALPRAZolam (XANAX) 0.25 MG tablet     Sig: Take 1 tablet by mouth 3 times daily as needed for Anxiety for up to 30 days. Max Daily Amount: 0.75 mg     Dispense:  90 tablet     Refill:  0     Orders Placed This Encounter   Procedures    CBC with Auto Differential     Standing Status:   Future     Standing Expiration Date:   6/13/2025    Comprehensive Metabolic Panel     Standing Status:   Future     Standing Expiration Date:   6/13/2025    Hemoglobin A1C     Standing Status:   Future     Standing Expiration Date:   6/13/2025    Magnesium     Standing Status:   Future     Standing Expiration Date:   6/13/2025    Lipid Panel     Standing Status:   Future     Standing Expiration Date:   6/13/2025     Order Specific Question:   Is Patient Fasting?/# of

## 2024-07-10 DIAGNOSIS — F41.9 ANXIETY: ICD-10-CM

## 2024-07-10 RX ORDER — CLINDAMYCIN HYDROCHLORIDE 300 MG/1
CAPSULE ORAL
Qty: 8 CAPSULE | Refills: 2 | Status: SHIPPED | OUTPATIENT
Start: 2024-07-10

## 2024-07-10 RX ORDER — FUROSEMIDE 20 MG/1
TABLET ORAL
Qty: 90 TABLET | Refills: 1 | Status: SHIPPED | OUTPATIENT
Start: 2024-07-10

## 2024-07-10 RX ORDER — ALPRAZOLAM 0.25 MG/1
0.25 TABLET ORAL 3 TIMES DAILY PRN
Qty: 90 TABLET | Refills: 0 | Status: SHIPPED | OUTPATIENT
Start: 2024-07-12 | End: 2024-08-11

## 2024-07-10 NOTE — TELEPHONE ENCOUNTER
Sheryl called requesting a refill of the below medication which has been pended for you: last filled 6/13/2024 #90    Requested Prescriptions     Pending Prescriptions Disp Refills    furosemide (LASIX) 20 MG tablet 90 tablet 1     Sig: take 1 tablet by mouth as needed for weight gain of 3-4 pounds overnight    clindamycin (CLEOCIN) 300 MG capsule 8 capsule 2     Sig: Take 600 mg 1 hr prior to dental procedures    ALPRAZolam (XANAX) 0.25 MG tablet 90 tablet 0     Sig: Take 1 tablet by mouth 3 times daily as needed for Anxiety for up to 30 days. Max Daily Amount: 0.75 mg       Last Appointment Date: 6/13/2024  Next Appointment Date: 12/13/2024    Allergies   Allergen Reactions    Amiodarone Shortness Of Breath     Authoring Clinician or Source System: Dharmesh Pelayo  Respiratory difficulty    Amoxicillin Anaphylaxis    Beta Adrenergic Blockers Shortness Of Breath    Metoprolol Shortness Of Breath     \"Beta Blockers    Penicillin V Potassium Anaphylaxis    Lisinopril Other (See Comments)     Eyes were beating along with her heart beat/HTN    Dronedarone Other (See Comments)     Irregular Heart Beats  Authoring Clinician or Source System: Kyara Schmitt  ([employer])  Edema and irregular heartbeats    Epinephrine Other (See Comments)     dental admin gave her afib      Statins     Morphine Nausea Only, Nausea And Vomiting and Other (See Comments)     Nausea  Other reaction(s): GI Upset  Nausea    Nexium [Esomeprazole Magnesium] Anxiety     Heart flutters more than normal.    Proton Pump Inhibitors Nausea And Vomiting and Anxiety     Other reaction(s): GI Distress, GI Upset  Heart flutters more than normal.

## 2024-08-13 DIAGNOSIS — F41.9 ANXIETY: ICD-10-CM

## 2024-08-13 RX ORDER — ALPRAZOLAM 0.25 MG/1
TABLET ORAL
Qty: 90 TABLET | Refills: 0 | Status: SHIPPED | OUTPATIENT
Start: 2024-08-13 | End: 2024-09-12

## 2024-08-13 NOTE — TELEPHONE ENCOUNTER
Per [facility], last fill 7/12/24, quantity 90 for 30 days.   Sheryl called requesting a refill of the below medication which has been pended for you:     Requested Prescriptions     Pending Prescriptions Disp Refills    ALPRAZolam (XANAX) 0.25 MG tablet [Pharmacy Med Name: ALPRAZolam Oral Tablet 0.25 MG] 90 tablet 0     Sig: TAKE 1 TABLET BY MOUTH 3 TIMES A DAY AS NEEDED FOR ANXIETY       Last Appointment Date: 6/13/2024  Next Appointment Date: 12/13/2024    Allergies   Allergen Reactions    Amiodarone Shortness Of Breath     Authoring Clinician or Source System: Dharmesh Pelayo  Respiratory difficulty    Amoxicillin Anaphylaxis    Beta Adrenergic Blockers Shortness Of Breath    Metoprolol Shortness Of Breath     \"Beta Blockers    Penicillin V Potassium Anaphylaxis    Lisinopril Other (See Comments)     Eyes were beating along with her heart beat/HTN    Dronedarone Other (See Comments)     Irregular Heart Beats  Authoring Clinician or Source System: Kyara Schmitt  ([employer])  Edema and irregular heartbeats    Epinephrine Other (See Comments)     dental admin gave her afib      Statins     Morphine Nausea Only, Nausea And Vomiting and Other (See Comments)     Nausea  Other reaction(s): GI Upset  Nausea    Nexium [Esomeprazole Magnesium] Anxiety     Heart flutters more than normal.    Proton Pump Inhibitors Nausea And Vomiting and Anxiety     Other reaction(s): GI Distress, GI Upset  Heart flutters more than normal.

## 2024-09-16 DIAGNOSIS — F41.9 ANXIETY: ICD-10-CM

## 2024-09-16 RX ORDER — ALPRAZOLAM 0.25 MG
TABLET ORAL
Qty: 90 TABLET | Refills: 0 | Status: SHIPPED | OUTPATIENT
Start: 2024-09-16 | End: 2024-10-16

## 2024-10-16 DIAGNOSIS — F41.9 ANXIETY: ICD-10-CM

## 2024-10-16 RX ORDER — ALPRAZOLAM 0.25 MG
0.25 TABLET ORAL 3 TIMES DAILY PRN
Qty: 90 TABLET | Refills: 0 | Status: SHIPPED | OUTPATIENT
Start: 2024-10-16 | End: 2024-11-15

## 2024-10-16 NOTE — TELEPHONE ENCOUNTER
Sheryl called requesting a refill of the below medication which has been pended for you: last filled 9/16/2024 #90    Requested Prescriptions     Pending Prescriptions Disp Refills    ALPRAZolam (XANAX) 0.25 MG tablet 90 tablet 0       Last Appointment Date: 6/13/2024  Next Appointment Date: 12/13/2024    Allergies   Allergen Reactions    Amiodarone Shortness Of Breath     Authoring Clinician or Source System: Dharmesh Pelayo  Respiratory difficulty    Amoxicillin Anaphylaxis    Beta Adrenergic Blockers Shortness Of Breath    Metoprolol Shortness Of Breath     \"Beta Blockers    Penicillin V Potassium Anaphylaxis    Lisinopril Other (See Comments)     Eyes were beating along with her heart beat/HTN    Dronedarone Other (See Comments)     Irregular Heart Beats  Authoring Clinician or Source System: Kyara Schmitt  ([employer])  Edema and irregular heartbeats    Epinephrine Other (See Comments)     dental admin gave her afib      Statins     Morphine Nausea Only, Nausea And Vomiting and Other (See Comments)     Nausea  Other reaction(s): GI Upset  Nausea    Nexium [Esomeprazole Magnesium] Anxiety     Heart flutters more than normal.    Proton Pump Inhibitors Nausea And Vomiting and Anxiety     Other reaction(s): GI Distress, GI Upset  Heart flutters more than normal.

## 2024-11-15 RX ORDER — POTASSIUM CHLORIDE 750 MG/1
10 CAPSULE, EXTENDED RELEASE ORAL DAILY PRN
Qty: 90 CAPSULE | Refills: 0 | Status: SHIPPED | OUTPATIENT
Start: 2024-11-15

## 2024-11-15 NOTE — TELEPHONE ENCOUNTER
Sheryl called requesting a refill of the below medication which has been pended for you:     Requested Prescriptions     Pending Prescriptions Disp Refills    potassium chloride (MICRO-K) 10 MEQ extended release capsule 90 capsule 0     Sig: Take 1 capsule by mouth daily as needed (if taking Lasix)       Last Appointment Date: 6/13/2024  Next Appointment Date: 12/20/2024    Allergies   Allergen Reactions    Amiodarone Shortness Of Breath     Authoring Clinician or Source System: Dharmesh Pelayo  Respiratory difficulty    Amoxicillin Anaphylaxis    Beta Adrenergic Blockers Shortness Of Breath    Metoprolol Shortness Of Breath     \"Beta Blockers    Penicillin V Potassium Anaphylaxis    Lisinopril Other (See Comments)     Eyes were beating along with her heart beat/HTN    Dronedarone Other (See Comments)     Irregular Heart Beats  Authoring Clinician or Source System: Kyara Schmitt  ([employer])  Edema and irregular heartbeats    Epinephrine Other (See Comments)     dental admin gave her afib      Statins     Morphine Nausea Only, Nausea And Vomiting and Other (See Comments)     Nausea  Other reaction(s): GI Upset  Nausea    Nexium [Esomeprazole Magnesium] Anxiety     Heart flutters more than normal.    Proton Pump Inhibitors Nausea And Vomiting and Anxiety     Other reaction(s): GI Distress, GI Upset  Heart flutters more than normal.

## 2024-11-23 DIAGNOSIS — F41.9 ANXIETY: ICD-10-CM

## 2024-11-25 RX ORDER — ALPRAZOLAM 0.25 MG/1
TABLET ORAL
Qty: 90 TABLET | Refills: 0 | Status: SHIPPED | OUTPATIENT
Start: 2024-11-25 | End: 2024-12-25

## 2024-11-25 NOTE — TELEPHONE ENCOUNTER
Sheryl called requesting a refill of the below medication which has been pended for you:     [place] from [place], [place], and [place] reviewed. Alprazolam 0.25 mg last filled 10/16/24 #90/30 days    Requested Prescriptions     Pending Prescriptions Disp Refills    ALPRAZolam (XANAX) 0.25 MG tablet [Pharmacy Med Name: ALPRAZolam Oral Tablet 0.25 MG] 90 tablet 0     Sig: TAKE 1 TABLET BY MOUTH 3 TIMES A DAY AS NEEDED FOR ANXIETY       Last Appointment Date: 6/13/2024  Next Appointment Date: 12/20/2024    Allergies   Allergen Reactions    Amiodarone Shortness Of Breath     Authoring Clinician or Source System: Dharmesh Pelayo  Respiratory difficulty    Amoxicillin Anaphylaxis    Beta Adrenergic Blockers Shortness Of Breath    Metoprolol Shortness Of Breath     \"Beta Blockers    Penicillin V Potassium Anaphylaxis    Lisinopril Other (See Comments)     Eyes were beating along with her heart beat/HTN    Dronedarone Other (See Comments)     Irregular Heart Beats  Authoring Clinician or Source System: Kyara Schmitt  ([employer])  Edema and irregular heartbeats    Epinephrine Other (See Comments)     dental admin gave her afib      Statins     Morphine Nausea Only, Nausea And Vomiting and Other (See Comments)     Nausea  Other reaction(s): GI Upset  Nausea    Nexium [Esomeprazole Magnesium] Anxiety     Heart flutters more than normal.    Proton Pump Inhibitors Nausea And Vomiting and Anxiety     Other reaction(s): GI Distress, GI Upset  Heart flutters more than normal.

## 2024-12-14 ENCOUNTER — HOSPITAL ENCOUNTER (OUTPATIENT)
Age: 74
Discharge: HOME | End: 2024-12-14
Payer: MEDICARE

## 2024-12-14 DIAGNOSIS — E78.2 MIXED HYPERLIPIDEMIA: ICD-10-CM

## 2024-12-14 DIAGNOSIS — E55.9 VITAMIN D DEFICIENCY: ICD-10-CM

## 2024-12-14 DIAGNOSIS — R73.01 IMPAIRED FASTING GLUCOSE: ICD-10-CM

## 2024-12-14 DIAGNOSIS — E83.42 HYPOMAGNESEMIA: ICD-10-CM

## 2024-12-14 DIAGNOSIS — I10 ESSENTIAL HYPERTENSION: ICD-10-CM

## 2024-12-14 LAB
25(OH)D3 SERPL-MCNC: 24.5 NG/ML (ref 30–100)
ALBUMIN SERPL-MCNC: 4.4 G/DL (ref 3.5–5.2)
ALBUMIN/GLOB SERPL: 1.3 {RATIO} (ref 1–2.5)
ALP SERPL-CCNC: 91 U/L (ref 35–104)
ALT SERPL-CCNC: 17 U/L (ref 5–33)
ANION GAP SERPL CALCULATED.3IONS-SCNC: 12 MMOL/L (ref 9–17)
AST SERPL-CCNC: 21 U/L
BASOPHILS # BLD: 0.04 K/UL (ref 0–0.2)
BASOPHILS NFR BLD: 1 % (ref 0–2)
BILIRUB SERPL-MCNC: 0.6 MG/DL (ref 0.3–1.2)
BUN SERPL-MCNC: 14 MG/DL (ref 8–23)
BUN/CREAT SERPL: 23 (ref 9–20)
CALCIUM SERPL-MCNC: 9.3 MG/DL (ref 8.6–10.4)
CHLORIDE SERPL-SCNC: 93 MMOL/L (ref 98–107)
CHOLEST SERPL-MCNC: 161 MG/DL (ref 0–199)
CHOLESTEROL/HDL RATIO: 2.7
CO2 SERPL-SCNC: 27 MMOL/L (ref 20–31)
CREAT SERPL-MCNC: 0.6 MG/DL (ref 0.5–0.9)
EOSINOPHIL # BLD: 0.07 K/UL (ref 0–0.44)
EOSINOPHILS RELATIVE PERCENT: 1 % (ref 1–4)
ERYTHROCYTE [DISTWIDTH] IN BLOOD BY AUTOMATED COUNT: 12 % (ref 11.8–14.4)
EST. AVERAGE GLUCOSE BLD GHB EST-MCNC: 114 MG/DL
GFR, ESTIMATED: >90 ML/MIN/1.73M2
GLUCOSE SERPL-MCNC: 112 MG/DL (ref 70–99)
HBA1C MFR BLD: 5.6 % (ref 4–6)
HCT VFR BLD AUTO: 39.2 % (ref 36.3–47.1)
HDLC SERPL-MCNC: 60 MG/DL
HGB BLD-MCNC: 13.2 G/DL (ref 11.9–15.1)
IMM GRANULOCYTES # BLD AUTO: 0.04 K/UL (ref 0–0.3)
IMM GRANULOCYTES NFR BLD: 1 %
LDLC SERPL CALC-MCNC: 85 MG/DL (ref 0–100)
LYMPHOCYTES NFR BLD: 1.59 K/UL (ref 1.1–3.7)
LYMPHOCYTES RELATIVE PERCENT: 21 % (ref 24–43)
MAGNESIUM SERPL-MCNC: 2 MG/DL (ref 1.6–2.6)
MCH RBC QN AUTO: 32.3 PG (ref 25.2–33.5)
MCHC RBC AUTO-ENTMCNC: 33.7 G/DL (ref 25.2–33.5)
MCV RBC AUTO: 95.8 FL (ref 82.6–102.9)
MONOCYTES NFR BLD: 0.86 K/UL (ref 0.1–1.2)
MONOCYTES NFR BLD: 11 % (ref 3–12)
NEUTROPHILS NFR BLD: 65 % (ref 36–65)
NEUTS SEG NFR BLD: 5.09 K/UL (ref 1.5–8.1)
NRBC BLD-RTO: 0 PER 100 WBC
PLATELET # BLD AUTO: 266 K/UL (ref 138–453)
PMV BLD AUTO: 8.8 FL (ref 8.1–13.5)
POTASSIUM SERPL-SCNC: 5 MMOL/L (ref 3.7–5.3)
PROT SERPL-MCNC: 7.7 G/DL (ref 6.4–8.3)
RBC # BLD AUTO: 4.09 M/UL (ref 3.95–5.11)
SODIUM SERPL-SCNC: 132 MMOL/L (ref 135–144)
TRIGL SERPL-MCNC: 82 MG/DL
VLDLC SERPL CALC-MCNC: 16 MG/DL (ref 1–30)
WBC OTHER # BLD: 7.7 K/UL (ref 3.5–11.3)

## 2024-12-14 PROCEDURE — 83036 HEMOGLOBIN GLYCOSYLATED A1C: CPT

## 2024-12-14 PROCEDURE — 85025 COMPLETE CBC W/AUTO DIFF WBC: CPT

## 2024-12-14 PROCEDURE — 82306 VITAMIN D 25 HYDROXY: CPT

## 2024-12-14 PROCEDURE — 36415 COLL VENOUS BLD VENIPUNCTURE: CPT

## 2024-12-14 PROCEDURE — 80053 COMPREHEN METABOLIC PANEL: CPT

## 2024-12-14 PROCEDURE — 80061 LIPID PANEL: CPT

## 2024-12-14 PROCEDURE — 83735 ASSAY OF MAGNESIUM: CPT

## 2024-12-17 SDOH — ECONOMIC STABILITY: FOOD INSECURITY: WITHIN THE PAST 12 MONTHS, THE FOOD YOU BOUGHT JUST DIDN'T LAST AND YOU DIDN'T HAVE MONEY TO GET MORE.: NEVER TRUE

## 2024-12-17 SDOH — ECONOMIC STABILITY: FOOD INSECURITY: WITHIN THE PAST 12 MONTHS, YOU WORRIED THAT YOUR FOOD WOULD RUN OUT BEFORE YOU GOT MONEY TO BUY MORE.: NEVER TRUE

## 2024-12-17 SDOH — ECONOMIC STABILITY: INCOME INSECURITY: HOW HARD IS IT FOR YOU TO PAY FOR THE VERY BASICS LIKE FOOD, HOUSING, MEDICAL CARE, AND HEATING?: NOT HARD AT ALL

## 2024-12-20 ENCOUNTER — OFFICE VISIT (OUTPATIENT)
Dept: FAMILY MEDICINE CLINIC | Age: 74
End: 2024-12-20
Payer: MEDICARE

## 2024-12-20 VITALS
OXYGEN SATURATION: 95 % | HEART RATE: 56 BPM | HEIGHT: 62 IN | BODY MASS INDEX: 21.35 KG/M2 | WEIGHT: 116 LBS | DIASTOLIC BLOOD PRESSURE: 82 MMHG | SYSTOLIC BLOOD PRESSURE: 138 MMHG | RESPIRATION RATE: 18 BRPM | TEMPERATURE: 97.8 F

## 2024-12-20 DIAGNOSIS — E78.2 MIXED HYPERLIPIDEMIA: ICD-10-CM

## 2024-12-20 DIAGNOSIS — F41.9 ANXIETY: ICD-10-CM

## 2024-12-20 DIAGNOSIS — L57.0 ACTINIC KERATOSES: ICD-10-CM

## 2024-12-20 DIAGNOSIS — E55.9 VITAMIN D DEFICIENCY: ICD-10-CM

## 2024-12-20 DIAGNOSIS — I10 ESSENTIAL HYPERTENSION: Primary | ICD-10-CM

## 2024-12-20 DIAGNOSIS — E83.42 HYPOMAGNESEMIA: ICD-10-CM

## 2024-12-20 DIAGNOSIS — I48.0 PAROXYSMAL ATRIAL FIBRILLATION (HCC): ICD-10-CM

## 2024-12-20 DIAGNOSIS — R73.01 IMPAIRED FASTING GLUCOSE: ICD-10-CM

## 2024-12-20 PROCEDURE — 17000 DESTRUCT PREMALG LESION: CPT | Performed by: FAMILY MEDICINE

## 2024-12-20 RX ORDER — FUROSEMIDE 20 MG/1
TABLET ORAL
Qty: 90 TABLET | Refills: 1 | Status: SHIPPED | OUTPATIENT
Start: 2024-12-20

## 2024-12-20 RX ORDER — FAMOTIDINE 20 MG/1
20 TABLET, FILM COATED ORAL 2 TIMES DAILY
Qty: 180 TABLET | Refills: 1 | Status: SHIPPED | OUTPATIENT
Start: 2024-12-20

## 2024-12-20 ASSESSMENT — ENCOUNTER SYMPTOMS
TROUBLE SWALLOWING: 0
COUGH: 0
EYE DISCHARGE: 0
SORE THROAT: 0
CONSTIPATION: 0
DIARRHEA: 0
SHORTNESS OF BREATH: 0
EYE REDNESS: 0
WHEEZING: 0
RHINORRHEA: 0
ABDOMINAL PAIN: 0
NAUSEA: 0
SINUS PRESSURE: 0
VOMITING: 0

## 2024-12-20 NOTE — PATIENT INSTRUCTIONS
Hospital Outpatient Visit on 12/14/2024   Component Date Value Ref Range Status    Vit D, 25-Hydroxy 12/14/2024 24.5 (L)  30.0 - 100.0 ng/mL Final    Comment:    Reference Range:  Vitamin D status         Range   Deficiency              <20 ng/mL   Mild Deficiency       20-30 ng/mL   Sufficiency           ng/mL   Toxicity               >100 ng/mL      Cholesterol, Total 12/14/2024 161  0 - 199 mg/dL Final    Comment:    Cholesterol Guidelines:      <200  Desirable   200-240  Borderline      >240  Undesirable         HDL 12/14/2024 60  >40 mg/dL Final    Comment:    HDL Guidelines:    <40     Undesirable   40-59    Borderline    >59     Desirable         LDL Cholesterol 12/14/2024 85  0 - 100 mg/dL Final    Comment:    LDL Guidelines:     <100    Desirable   100-129   Near to/above Desirable   130-159   Borderline      >159   Undesirable     Direct (measured) LDL and calculated LDL are not interchangeable tests.      Chol/HDL Ratio 12/14/2024 2.7   Final    Triglycerides 12/14/2024 82  <150 mg/dL Final    Comment:    Triglyceride Guidelines:     <150   Desirable   150-199  Borderline   200-499  High     >499   Very high   Based on AHA Guidelines for fasting triglyceride, October 2012.         VLDL 12/14/2024 16  1 - 30 mg/dL Final    Magnesium 12/14/2024 2.0  1.6 - 2.6 mg/dL Final    Hemoglobin A1C 12/14/2024 5.6  4.0 - 6.0 % Final    Estimated Avg Glucose 12/14/2024 114  mg/dL Final    Comment: The ADA and AACC recommend providing the estimated average glucose result to permit better   patient understanding of their HBA1c result.      Sodium 12/14/2024 132 (L)  135 - 144 mmol/L Final    Potassium 12/14/2024 5.0  3.7 - 5.3 mmol/L Final    Chloride 12/14/2024 93 (L)  98 - 107 mmol/L Final    CO2 12/14/2024 27  20 - 31 mmol/L Final    Anion Gap 12/14/2024 12  9 - 17 mmol/L Final    Glucose 12/14/2024 112 (H)  70 - 99 mg/dL Final    BUN 12/14/2024 14  8 - 23 mg/dL Final    Creatinine 12/14/2024 0.6  0.5 - 0.9

## 2024-12-20 NOTE — PROGRESS NOTES
2024     Sheryl Meier (:  1950) is a 74 y.o. female, here for evaluation of the following medical concerns:    HPI    History of Present Illness  The patient is a 74-year-old female here for a 6-month follow-up of hypertension, hyperlipidemia, impaired fasting glucose, anxiety, hypomagnesemia, paroxysmal atrial fibrillation, and vitamin D deficiency.    She reports intermittent sleep disturbances, often waking up around 1:00 AM after going to bed at 10:30 PM. Despite these episodes, she generally feels well.    She underwent an echocardiogram in the , which initially suggested severe left ventricular thickening, prompting a recommendation for a contrast-enhanced MRI. However, the subsequent MRI revealed only minimal ventricular thickening, leading to the conclusion that the echocardiogram results were inaccurate. She has a scheduled appointment with her cardiologist on Monday. 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    She received a card in the mail for a hearing aid for $ 395 at [facility]. She reports difficulty hearing certain tones and was advised that she had wax buildup in her ears, which was causing her hearing issues. She was told that she could develop dementia if she did not get hearing aids. She was also advised to bring someone with her to the appointment, but she does not have anyone to accompany her.    She has a skin lesion on her right ear that she keeps picking at and is requesting to have it frozen off.    MEDICATIONS  Current: furosemide, famotidine          Patient's recent lab reports are as follows:    Results for orders placed or performed during the hospital encounter of 24   Vitamin D 25 Hydroxy   Result Value Ref Range    Vit D, 25-Hydroxy 24.5 (L) 30.0 - 100.0 ng/mL   Lipid Panel   Result Value Ref Range    Cholesterol, Total 161 0 - 199 mg/dL    HDL 60

## 2024-12-20 NOTE — PROGRESS NOTES
Patient declines flu, covid, pneumonia, RSV, tdap, and shingles vaccines. Declines cologuard/colon screening. Declines mammogram screening. Declines AWV.

## 2025-01-03 ENCOUNTER — PATIENT MESSAGE (OUTPATIENT)
Dept: FAMILY MEDICINE CLINIC | Age: 75
End: 2025-01-03

## 2025-01-03 DIAGNOSIS — M54.50 ACUTE LOW BACK PAIN, UNSPECIFIED BACK PAIN LATERALITY, UNSPECIFIED WHETHER SCIATICA PRESENT: Primary | ICD-10-CM

## 2025-01-03 RX ORDER — TRAMADOL HYDROCHLORIDE 50 MG/1
50 TABLET ORAL EVERY 4 HOURS PRN
Qty: 18 TABLET | Refills: 0 | Status: SHIPPED | OUTPATIENT
Start: 2025-01-03 | End: 2025-01-06

## 2025-01-10 ENCOUNTER — OFFICE VISIT (OUTPATIENT)
Dept: FAMILY MEDICINE CLINIC | Age: 75
End: 2025-01-10

## 2025-01-10 ENCOUNTER — TELEPHONE (OUTPATIENT)
Dept: FAMILY MEDICINE CLINIC | Age: 75
End: 2025-01-10

## 2025-01-10 VITALS
HEIGHT: 62 IN | SYSTOLIC BLOOD PRESSURE: 136 MMHG | TEMPERATURE: 97.7 F | OXYGEN SATURATION: 94 % | BODY MASS INDEX: 21.16 KG/M2 | RESPIRATION RATE: 20 BRPM | DIASTOLIC BLOOD PRESSURE: 70 MMHG | WEIGHT: 115 LBS | HEART RATE: 68 BPM

## 2025-01-10 DIAGNOSIS — M25.552 BILATERAL HIP PAIN: ICD-10-CM

## 2025-01-10 DIAGNOSIS — M54.6 ACUTE MIDLINE THORACIC BACK PAIN: Primary | ICD-10-CM

## 2025-01-10 DIAGNOSIS — M25.551 BILATERAL HIP PAIN: ICD-10-CM

## 2025-01-10 DIAGNOSIS — M54.42 ACUTE MIDLINE LOW BACK PAIN WITH LEFT-SIDED SCIATICA: ICD-10-CM

## 2025-01-10 DIAGNOSIS — R10.2 PELVIC PAIN: ICD-10-CM

## 2025-01-10 RX ORDER — ONDANSETRON 4 MG/1
4 TABLET, ORALLY DISINTEGRATING ORAL 3 TIMES DAILY PRN
Qty: 30 TABLET | Refills: 2 | Status: SHIPPED | OUTPATIENT
Start: 2025-01-10

## 2025-01-10 SDOH — ECONOMIC STABILITY: FOOD INSECURITY: WITHIN THE PAST 12 MONTHS, YOU WORRIED THAT YOUR FOOD WOULD RUN OUT BEFORE YOU GOT MONEY TO BUY MORE.: NEVER TRUE

## 2025-01-10 SDOH — ECONOMIC STABILITY: FOOD INSECURITY: WITHIN THE PAST 12 MONTHS, THE FOOD YOU BOUGHT JUST DIDN'T LAST AND YOU DIDN'T HAVE MONEY TO GET MORE.: NEVER TRUE

## 2025-01-10 ASSESSMENT — PATIENT HEALTH QUESTIONNAIRE - PHQ9
SUM OF ALL RESPONSES TO PHQ QUESTIONS 1-9: 0
2. FEELING DOWN, DEPRESSED OR HOPELESS: NOT AT ALL
SUM OF ALL RESPONSES TO PHQ QUESTIONS 1-9: 0
1. LITTLE INTEREST OR PLEASURE IN DOING THINGS: NOT AT ALL
SUM OF ALL RESPONSES TO PHQ QUESTIONS 1-9: 0
SUM OF ALL RESPONSES TO PHQ9 QUESTIONS 1 & 2: 0
SUM OF ALL RESPONSES TO PHQ QUESTIONS 1-9: 0

## 2025-01-10 ASSESSMENT — ENCOUNTER SYMPTOMS
BACK PAIN: 1
COLOR CHANGE: 1

## 2025-01-10 NOTE — PROGRESS NOTES
[facility] called and said MRI orders for lumbar,pelvis,l and thoracic can't be done until Feb 26 at [facility]. She has to go to this facility due to her pacemaker. If we change the orders to stat they can fit her in sooner. Spoke with Dr Purvis and she is in agreement to change orders to stat. Faxed orders to Kristina at [facility] Scheduling fax [phone]  [phone]

## 2025-01-10 NOTE — PROGRESS NOTES
1/10/2025     Sheryl Meier (:  1950) is a 74 y.o. female, here for evaluation of the following medical concerns:    HPI  History of Present Illness  The patient is a 74-year-old female who presents for ongoing back pain after a fall on 2024.    She reports persistent back pain following a fall on 2024, which occurred while ascending stairs to the bathroom at approximately 4:00 AM. She describes the incident as a sudden drop from the third or fourth step, resulting in a landing on her back. She experiences pain throughout her back, with a particular focus on her neck and the area between her shoulders. She suspects an issue with one of her vertebrae. Additionally, she reports pain radiating from her hip down to her calf and ankle, suggesting possible sciatic nerve involvement. She has been informed that she can not undergo an MRI due to her pacemaker but recalls having had 2 or 3 MRIs a few years prior. She also mentions a previous diagnosis of degenerative disc disease. She expresses concern about the need for a hip replacement within 72 hours of a fracture, as she has heard this is necessary for a successful outcome. She is seeking a prescription for physical therapy to further investigate her back issues. She reports that her pain is constant, regardless of her position, and is only alleviated by the application of moist heat during showers. She has been advised against the use of Motrin or Aleve due to her cardiac condition and is considering the use of Voltaren gel. She has a history of kyphoplasty at T8, T9, T10, and T12 and is questioning whether it is possible for these areas to fracture again.    She was prescribed tramadol for pain management but found it ineffective. She experienced nausea after taking the medication and is requesting a prescription for an antiemetic that is not an opioid or controlled substance.    She reports a recent injury to her hand caused by a snapping

## 2025-01-10 NOTE — TELEPHONE ENCOUNTER
Patient in office today and had MRI's ordered. Has to do them in [place]. Patient called to schedule and they advised her it would be a 3 month wait unless they were changed to stat. Can orders be changed? Please call and make patient aware.

## 2025-01-30 ENCOUNTER — PATIENT MESSAGE (OUTPATIENT)
Dept: FAMILY MEDICINE CLINIC | Age: 75
End: 2025-01-30

## 2025-02-16 DIAGNOSIS — F41.9 ANXIETY: ICD-10-CM

## 2025-02-17 RX ORDER — ALPRAZOLAM 0.25 MG
TABLET ORAL
Qty: 90 TABLET | Refills: 0 | Status: SHIPPED | OUTPATIENT
Start: 2025-02-17 | End: 2025-03-19

## 2025-03-20 DIAGNOSIS — F41.9 ANXIETY: ICD-10-CM

## 2025-03-20 NOTE — TELEPHONE ENCOUNTER
Sheryl called requesting a refill of the below medication which has been pended for you: Per [facility] last alprazolam fill 2/17/25 #90    Requested Prescriptions     Pending Prescriptions Disp Refills    ALPRAZolam (XANAX) 0.25 MG tablet [Pharmacy Med Name: ALPRAZolam Oral Tablet 0.25 MG] 90 tablet 0     Sig: TAKE 1 TABLET BY MOUTH 3 TIMES A DAY AS NEEDED FOR ANXIETY       Last Appointment Date: 1/10/2025  Next Appointment Date: 6/25/2025    Allergies   Allergen Reactions    Amiodarone Shortness Of Breath     Authoring Clinician or Source System: Dharmesh Pelayo  Respiratory difficulty    Amoxicillin Anaphylaxis    Beta Adrenergic Blockers Shortness Of Breath    Metoprolol Shortness Of Breath     \"Beta Blockers    Penicillin V Potassium Anaphylaxis    Lisinopril Other (See Comments)     Eyes were beating along with her heart beat/HTN    Dronedarone Other (See Comments)     Irregular Heart Beats  Authoring Clinician or Source System: Kyara Schmitt  ([employer])  Edema and irregular heartbeats    Epinephrine Other (See Comments)     dental admin gave her afib      Statins     Morphine Nausea Only, Nausea And Vomiting and Other (See Comments)     Nausea  Other reaction(s): GI Upset  Nausea    Nexium [Esomeprazole Magnesium] Anxiety     Heart flutters more than normal.    Proton Pump Inhibitors Nausea And Vomiting and Anxiety     Other reaction(s): GI Distress, GI Upset  Heart flutters more than normal.

## 2025-03-21 RX ORDER — ALPRAZOLAM 0.25 MG
TABLET ORAL
Qty: 90 TABLET | Refills: 0 | Status: SHIPPED | OUTPATIENT
Start: 2025-03-21 | End: 2025-04-20

## 2025-04-01 ENCOUNTER — PATIENT MESSAGE (OUTPATIENT)
Dept: FAMILY MEDICINE CLINIC | Age: 75
End: 2025-04-01

## 2025-04-29 RX ORDER — DILTIAZEM HCL 60 MG
TABLET ORAL
Qty: 270 TABLET | Refills: 1 | Status: SHIPPED | OUTPATIENT
Start: 2025-04-29

## 2025-04-29 NOTE — TELEPHONE ENCOUNTER
Sheryl called requesting a refill of the below medication which has been pended for you:     Requested Prescriptions     Pending Prescriptions Disp Refills    dilTIAZem (CARDIZEM) 60 MG immediate release tablet 270 tablet 1     Sig: TAKE 1 TABLET BY MOUTH UP TO THREE TIMES DAILY AS NEEDED FOR A-FIB       Last Appointment Date: 1/10/2025  Next Appointment Date: 6/25/2025    Allergies   Allergen Reactions    Amiodarone Shortness Of Breath     Authoring Clinician or Source System: Dharmesh Pelayo  Respiratory difficulty    Amoxicillin Anaphylaxis    Beta Adrenergic Blockers Shortness Of Breath    Metoprolol Shortness Of Breath     \"Beta Blockers    Penicillin V Potassium Anaphylaxis    Lisinopril Other (See Comments)     Eyes were beating along with her heart beat/HTN    Dronedarone Other (See Comments)     Irregular Heart Beats  Authoring Clinician or Source System: Kyara Schmitt  ([employer])  Edema and irregular heartbeats    Epinephrine Other (See Comments)     dental admin gave her afib      Statins     Morphine Nausea Only, Nausea And Vomiting and Other (See Comments)     Nausea  Other reaction(s): GI Upset  Nausea    Nexium [Esomeprazole Magnesium] Anxiety     Heart flutters more than normal.    Proton Pump Inhibitors Nausea And Vomiting and Anxiety     Other reaction(s): GI Distress, GI Upset  Heart flutters more than normal.

## 2025-05-15 ENCOUNTER — PATIENT MESSAGE (OUTPATIENT)
Dept: FAMILY MEDICINE CLINIC | Age: 75
End: 2025-05-15

## 2025-05-15 DIAGNOSIS — I48.0 PAROXYSMAL ATRIAL FIBRILLATION (HCC): ICD-10-CM

## 2025-05-15 DIAGNOSIS — E78.2 MIXED HYPERLIPIDEMIA: Primary | ICD-10-CM

## 2025-05-15 DIAGNOSIS — I20.0 UNSTABLE ANGINA (HCC): ICD-10-CM

## 2025-05-16 ENCOUNTER — HOSPITAL ENCOUNTER (OUTPATIENT)
Age: 75
Discharge: HOME | End: 2025-05-16
Payer: MEDICARE

## 2025-05-16 DIAGNOSIS — R73.01 IMPAIRED FASTING GLUCOSE: ICD-10-CM

## 2025-05-16 DIAGNOSIS — I48.0 PAROXYSMAL ATRIAL FIBRILLATION (HCC): ICD-10-CM

## 2025-05-16 DIAGNOSIS — I20.0 UNSTABLE ANGINA (HCC): ICD-10-CM

## 2025-05-16 DIAGNOSIS — E83.42 HYPOMAGNESEMIA: ICD-10-CM

## 2025-05-16 DIAGNOSIS — E78.2 MIXED HYPERLIPIDEMIA: ICD-10-CM

## 2025-05-16 DIAGNOSIS — E55.9 VITAMIN D DEFICIENCY: ICD-10-CM

## 2025-05-16 DIAGNOSIS — I10 ESSENTIAL HYPERTENSION: ICD-10-CM

## 2025-05-16 LAB
25(OH)D3 SERPL-MCNC: 18.2 NG/ML (ref 30–100)
ALBUMIN SERPL-MCNC: 4.2 G/DL (ref 3.5–5.2)
ALBUMIN/GLOB SERPL: 1.3 {RATIO} (ref 1–2.5)
ALP SERPL-CCNC: 122 U/L (ref 35–104)
ALT SERPL-CCNC: 23 U/L (ref 10–35)
ANION GAP SERPL CALCULATED.3IONS-SCNC: 11 MMOL/L (ref 9–16)
AST SERPL-CCNC: 27 U/L (ref 10–35)
BASOPHILS # BLD: 0.05 K/UL (ref 0–0.2)
BASOPHILS NFR BLD: 1 % (ref 0–2)
BILIRUB SERPL-MCNC: 0.5 MG/DL (ref 0–1.2)
BNP SERPL-MCNC: 3637 PG/ML (ref 0–125)
BUN SERPL-MCNC: 8 MG/DL (ref 8–23)
BUN/CREAT SERPL: 13 (ref 9–20)
CALCIUM SERPL-MCNC: 9.3 MG/DL (ref 8.6–10.4)
CHLORIDE SERPL-SCNC: 93 MMOL/L (ref 98–107)
CHOLEST SERPL-MCNC: 112 MG/DL (ref 0–199)
CHOLESTEROL/HDL RATIO: 2.6
CO2 SERPL-SCNC: 27 MMOL/L (ref 20–31)
CREAT SERPL-MCNC: 0.6 MG/DL (ref 0.6–0.9)
EOSINOPHIL # BLD: 0.14 K/UL (ref 0–0.44)
EOSINOPHILS RELATIVE PERCENT: 2 % (ref 1–4)
ERYTHROCYTE [DISTWIDTH] IN BLOOD BY AUTOMATED COUNT: 12.6 % (ref 11.8–14.4)
GFR, ESTIMATED: >90 ML/MIN/1.73M2
GLUCOSE SERPL-MCNC: 102 MG/DL (ref 74–99)
HCT VFR BLD AUTO: 39.7 % (ref 36.3–47.1)
HDLC SERPL-MCNC: 43 MG/DL
HGB BLD-MCNC: 13.2 G/DL (ref 11.9–15.1)
IMM GRANULOCYTES # BLD AUTO: 0.07 K/UL (ref 0–0.3)
IMM GRANULOCYTES NFR BLD: 1 %
LDLC SERPL CALC-MCNC: 58 MG/DL (ref 0–100)
LYMPHOCYTES NFR BLD: 0.92 K/UL (ref 1.1–3.7)
LYMPHOCYTES RELATIVE PERCENT: 11 % (ref 24–43)
MAGNESIUM SERPL-MCNC: 2.1 MG/DL (ref 1.6–2.4)
MCH RBC QN AUTO: 30.4 PG (ref 25.2–33.5)
MCHC RBC AUTO-ENTMCNC: 33.2 G/DL (ref 25.2–33.5)
MCV RBC AUTO: 91.5 FL (ref 82.6–102.9)
MONOCYTES NFR BLD: 0.88 K/UL (ref 0.1–1.2)
MONOCYTES NFR BLD: 11 % (ref 3–12)
NEUTROPHILS NFR BLD: 74 % (ref 36–65)
NEUTS SEG NFR BLD: 6.27 K/UL (ref 1.5–8.1)
NRBC BLD-RTO: 0 PER 100 WBC
PLATELET # BLD AUTO: 208 K/UL (ref 138–453)
PMV BLD AUTO: 9.6 FL (ref 8.1–13.5)
POTASSIUM SERPL-SCNC: 4.5 MMOL/L (ref 3.7–5.3)
PROT SERPL-MCNC: 7.5 G/DL (ref 6.6–8.7)
RBC # BLD AUTO: 4.34 M/UL (ref 3.95–5.11)
SODIUM SERPL-SCNC: 131 MMOL/L (ref 136–145)
TRIGL SERPL-MCNC: 56 MG/DL
TSH SERPL DL<=0.05 MIU/L-ACNC: 1.97 UIU/ML (ref 0.27–4.2)
VLDLC SERPL CALC-MCNC: 11 MG/DL (ref 1–30)
WBC OTHER # BLD: 8.3 K/UL (ref 3.5–11.3)

## 2025-05-16 PROCEDURE — 83735 ASSAY OF MAGNESIUM: CPT

## 2025-05-16 PROCEDURE — 83036 HEMOGLOBIN GLYCOSYLATED A1C: CPT

## 2025-05-16 PROCEDURE — 80053 COMPREHEN METABOLIC PANEL: CPT

## 2025-05-16 PROCEDURE — 36415 COLL VENOUS BLD VENIPUNCTURE: CPT

## 2025-05-16 PROCEDURE — 82306 VITAMIN D 25 HYDROXY: CPT

## 2025-05-16 PROCEDURE — 80061 LIPID PANEL: CPT

## 2025-05-16 PROCEDURE — 85025 COMPLETE CBC W/AUTO DIFF WBC: CPT

## 2025-05-16 PROCEDURE — 84443 ASSAY THYROID STIM HORMONE: CPT

## 2025-05-16 PROCEDURE — 83880 ASSAY OF NATRIURETIC PEPTIDE: CPT

## 2025-05-17 ENCOUNTER — RESULTS FOLLOW-UP (OUTPATIENT)
Dept: PRIMARY CARE CLINIC | Age: 75
End: 2025-05-17

## 2025-05-17 LAB
EST. AVERAGE GLUCOSE BLD GHB EST-MCNC: 114 MG/DL
HBA1C MFR BLD: 5.6 % (ref 4–6)

## 2025-05-19 RX ORDER — DILTIAZEM HYDROCHLORIDE 240 MG/1
240 CAPSULE, COATED, EXTENDED RELEASE ORAL EVERY MORNING
COMMUNITY
Start: 2025-03-02

## 2025-05-20 ENCOUNTER — HOSPITAL ENCOUNTER (EMERGENCY)
Age: 75
Discharge: HOME | End: 2025-05-20
Attending: EMERGENCY MEDICINE
Payer: MEDICARE

## 2025-05-20 ENCOUNTER — APPOINTMENT (OUTPATIENT)
Dept: CT IMAGING | Age: 75
End: 2025-05-20
Payer: MEDICARE

## 2025-05-20 VITALS
RESPIRATION RATE: 24 BRPM | HEART RATE: 64 BPM | DIASTOLIC BLOOD PRESSURE: 89 MMHG | TEMPERATURE: 97.5 F | SYSTOLIC BLOOD PRESSURE: 196 MMHG | OXYGEN SATURATION: 95 %

## 2025-05-20 DIAGNOSIS — I50.9 ACUTE ON CHRONIC CONGESTIVE HEART FAILURE, UNSPECIFIED HEART FAILURE TYPE (HCC): ICD-10-CM

## 2025-05-20 DIAGNOSIS — R91.1 PULMONARY NODULE 1 CM OR GREATER IN DIAMETER: Primary | ICD-10-CM

## 2025-05-20 LAB
ALBUMIN SERPL-MCNC: 4.4 G/DL (ref 3.5–5.2)
ALBUMIN/GLOB SERPL: 1.2 {RATIO} (ref 1–2.5)
ALP SERPL-CCNC: 133 U/L (ref 35–104)
ALT SERPL-CCNC: 21 U/L (ref 10–35)
ANION GAP SERPL CALCULATED.3IONS-SCNC: 14 MMOL/L (ref 9–16)
AST SERPL-CCNC: 27 U/L (ref 10–35)
BASOPHILS # BLD: 0.06 K/UL (ref 0–0.2)
BASOPHILS NFR BLD: 1 % (ref 0–2)
BILIRUB SERPL-MCNC: 0.4 MG/DL (ref 0–1.2)
BNP SERPL-MCNC: 3615 PG/ML (ref 0–125)
BUN SERPL-MCNC: 11 MG/DL (ref 8–23)
BUN/CREAT SERPL: 18 (ref 9–20)
CALCIUM SERPL-MCNC: 9.4 MG/DL (ref 8.6–10.4)
CHLORIDE SERPL-SCNC: 94 MMOL/L (ref 98–107)
CO2 SERPL-SCNC: 25 MMOL/L (ref 20–31)
CREAT SERPL-MCNC: 0.6 MG/DL (ref 0.6–0.9)
EOSINOPHIL # BLD: 0.09 K/UL (ref 0–0.44)
EOSINOPHILS RELATIVE PERCENT: 1 % (ref 1–4)
ERYTHROCYTE [DISTWIDTH] IN BLOOD BY AUTOMATED COUNT: 12.6 % (ref 11.8–14.4)
GFR, ESTIMATED: >90 ML/MIN/1.73M2
GLUCOSE SERPL-MCNC: 124 MG/DL (ref 74–99)
HCT VFR BLD AUTO: 40.5 % (ref 36.3–47.1)
HGB BLD-MCNC: 13.5 G/DL (ref 11.9–15.1)
IMM GRANULOCYTES # BLD AUTO: 0.07 K/UL (ref 0–0.3)
IMM GRANULOCYTES NFR BLD: 1 %
LYMPHOCYTES NFR BLD: 1.44 K/UL (ref 1.1–3.7)
LYMPHOCYTES RELATIVE PERCENT: 16 % (ref 24–43)
MCH RBC QN AUTO: 30.2 PG (ref 25.2–33.5)
MCHC RBC AUTO-ENTMCNC: 33.3 G/DL (ref 25.2–33.5)
MCV RBC AUTO: 90.6 FL (ref 82.6–102.9)
MONOCYTES NFR BLD: 0.84 K/UL (ref 0.1–1.2)
MONOCYTES NFR BLD: 9 % (ref 3–12)
NEUTROPHILS NFR BLD: 72 % (ref 36–65)
NEUTS SEG NFR BLD: 6.41 K/UL (ref 1.5–8.1)
NRBC BLD-RTO: 0 PER 100 WBC
PLATELET # BLD AUTO: 285 K/UL (ref 138–453)
PMV BLD AUTO: 9.3 FL (ref 8.1–13.5)
POTASSIUM SERPL-SCNC: 3.9 MMOL/L (ref 3.7–5.3)
PROT SERPL-MCNC: 8.1 G/DL (ref 6.6–8.7)
RBC # BLD AUTO: 4.47 M/UL (ref 3.95–5.11)
SODIUM SERPL-SCNC: 133 MMOL/L (ref 136–145)
TROPONIN I SERPL HS-MCNC: 9 NG/L (ref 0–14)
TROPONIN I SERPL HS-MCNC: 9 NG/L (ref 0–14)
WBC OTHER # BLD: 8.9 K/UL (ref 3.5–11.3)

## 2025-05-20 PROCEDURE — 93005 ELECTROCARDIOGRAM TRACING: CPT | Performed by: EMERGENCY MEDICINE

## 2025-05-20 PROCEDURE — 84484 ASSAY OF TROPONIN QUANT: CPT

## 2025-05-20 PROCEDURE — 36415 COLL VENOUS BLD VENIPUNCTURE: CPT

## 2025-05-20 PROCEDURE — 80053 COMPREHEN METABOLIC PANEL: CPT

## 2025-05-20 PROCEDURE — 6360000004 HC RX CONTRAST MEDICATION: Performed by: EMERGENCY MEDICINE

## 2025-05-20 PROCEDURE — 85025 COMPLETE CBC W/AUTO DIFF WBC: CPT

## 2025-05-20 PROCEDURE — 83880 ASSAY OF NATRIURETIC PEPTIDE: CPT

## 2025-05-20 PROCEDURE — 71260 CT THORAX DX C+: CPT

## 2025-05-20 PROCEDURE — 99285 EMERGENCY DEPT VISIT HI MDM: CPT

## 2025-05-20 RX ORDER — IOPAMIDOL 755 MG/ML
80 INJECTION, SOLUTION INTRAVASCULAR
Status: COMPLETED | OUTPATIENT
Start: 2025-05-20 | End: 2025-05-20

## 2025-05-20 RX ADMIN — IOPAMIDOL 80 ML: 755 INJECTION, SOLUTION INTRAVENOUS at 12:40

## 2025-05-20 ASSESSMENT — ENCOUNTER SYMPTOMS
COUGH: 0
EYE PAIN: 0
VOMITING: 0
BLOOD IN STOOL: 0
DIARRHEA: 0
CONSTIPATION: 0
NAUSEA: 0
BACK PAIN: 1
ABDOMINAL PAIN: 0
SHORTNESS OF BREATH: 1

## 2025-05-20 ASSESSMENT — PAIN DESCRIPTION - LOCATION: LOCATION: BACK

## 2025-05-20 ASSESSMENT — LIFESTYLE VARIABLES: HOW OFTEN DO YOU HAVE A DRINK CONTAINING ALCOHOL: NEVER

## 2025-05-20 ASSESSMENT — PAIN SCALES - GENERAL: PAINLEVEL_OUTOF10: 4

## 2025-05-20 ASSESSMENT — PAIN DESCRIPTION - ORIENTATION: ORIENTATION: UPPER

## 2025-05-20 ASSESSMENT — PAIN DESCRIPTION - DESCRIPTORS: DESCRIPTORS: PRESSURE

## 2025-05-20 ASSESSMENT — PAIN DESCRIPTION - ONSET: ONSET: ON-GOING

## 2025-05-20 ASSESSMENT — PAIN DESCRIPTION - PAIN TYPE: TYPE: CHRONIC PAIN

## 2025-05-20 ASSESSMENT — PAIN DESCRIPTION - FREQUENCY: FREQUENCY: INTERMITTENT

## 2025-05-20 ASSESSMENT — PAIN - FUNCTIONAL ASSESSMENT: PAIN_FUNCTIONAL_ASSESSMENT: PREVENTS OR INTERFERES SOME ACTIVE ACTIVITIES AND ADLS

## 2025-05-20 NOTE — ED PROVIDER NOTES
[facility]  eMERGENCY dEPARTMENT eNCOUnter      Pt Name: Sheryl Meier  MRN: [id number]  Birthdate 1950  Date of evaluation: 5/20/2025      CHIEF COMPLAINT       Chief Complaint   Patient presents with    Shortness of Breath         HISTORY OF PRESENT ILLNESS    Sheryl Meier is a 75 y.o. female who presents with a chief complaint of shortness of breath she says is mostly with activity with some chest tightness she has some leg swelling right more than left she said that is normal her right leg always swells more has been no recent travel or immobilization there is been no fevers or chills or cough she does have a prior history of atrial fibrillation and an enlarged heart  Patient states she takes an occasional Lasix and that seems to help she did have recent laboratory studies that she said showed a BNP over 3000    REVIEW OF SYSTEMS         Review of Systems   Constitutional:  Positive for fatigue. Negative for chills and fever.   HENT:  Negative for congestion and ear pain.    Eyes:  Negative for pain and visual disturbance.   Respiratory:  Positive for shortness of breath. Negative for cough.    Cardiovascular:  Positive for leg swelling. Negative for chest pain and palpitations.   Gastrointestinal:  Negative for abdominal pain, blood in stool, constipation, diarrhea, nausea and vomiting.   Endocrine: Negative for polydipsia and polyuria.   Genitourinary:  Negative for difficulty urinating, dysuria, frequency, vaginal bleeding and vaginal discharge.   Musculoskeletal:  Positive for back pain. Negative for joint swelling, myalgias, neck pain and neck stiffness.        Chronic back pain from compression fractures in both the thoracic and lumbar region this is unchanged   Skin:  Negative for rash.   Neurological:  Negative for dizziness, weakness and headaches.   Hematological:  Negative for adenopathy. Does not bruise/bleed easily.   Psychiatric/Behavioral:  Negative for confusion, self-injury and 
no

## 2025-05-20 NOTE — DISCHARGE INSTRUCTIONS
Take additional dose of your Lasix today and tomorrow follow-up with your doctor scheduled this week for your shortness of breath also for the nodule seen on your CAT scan this nodules in your left lung appears to have grown

## 2025-05-20 NOTE — TELEPHONE ENCOUNTER
Patient reports intermittent SOB but notes that this is due to her back problems and is not new. She reports feeling pressure on her shoulders when she walks. She reports she doesn't think the lasix is working because she is not urinating like she used to. She is still drinking like she used to. She says her feet are swollen more than usual. When she takes off her compression stockings in the evening the swell back up. Pt took a PRN cardizem 30mg (half a 60mg) last at 0230am. Usually takes one when she wakes up in the middle of the night when her heart races to help her get back to sleep. Patient gets out of breath easily when putting her stockings on. Reports just not feeling right. Advised ER for cardiac workup. Scheduled to see [facility] on 5/22/25. Encouraged Cardiology follow-up as well.   
Yes, agree with cardiology follow up or ER follow up if severe symptoms.  I will see her on 5/22/25 for assessment.  
Pt offered admission by MD [facility] but repeatedly declined./Family/Patient

## 2025-05-22 ENCOUNTER — OFFICE VISIT (OUTPATIENT)
Dept: FAMILY MEDICINE CLINIC | Age: 75
End: 2025-05-22

## 2025-05-22 VITALS
WEIGHT: 110.1 LBS | HEART RATE: 58 BPM | TEMPERATURE: 97.5 F | RESPIRATION RATE: 18 BRPM | HEIGHT: 62 IN | OXYGEN SATURATION: 97 % | BODY MASS INDEX: 20.26 KG/M2 | DIASTOLIC BLOOD PRESSURE: 80 MMHG | SYSTOLIC BLOOD PRESSURE: 130 MMHG

## 2025-05-22 DIAGNOSIS — J84.10 LUNG GRANULOMA (HCC): ICD-10-CM

## 2025-05-22 DIAGNOSIS — I50.9 ACUTE ON CHRONIC CONGESTIVE HEART FAILURE, UNSPECIFIED HEART FAILURE TYPE (HCC): ICD-10-CM

## 2025-05-22 DIAGNOSIS — I48.0 PAROXYSMAL ATRIAL FIBRILLATION (HCC): ICD-10-CM

## 2025-05-22 DIAGNOSIS — R91.1 PULMONARY NODULE 1 CM OR GREATER IN DIAMETER: Primary | ICD-10-CM

## 2025-05-22 LAB
EKG ATRIAL RATE: 64 BPM
EKG ATRIAL RATE: 69 BPM
EKG P AXIS: 33 DEGREES
EKG P-R INTERVAL: 216 MS
EKG P-R INTERVAL: 224 MS
EKG Q-T INTERVAL: 438 MS
EKG Q-T INTERVAL: 470 MS
EKG QRS DURATION: 120 MS
EKG QRS DURATION: 120 MS
EKG QTC CALCULATION (BAZETT): 469 MS
EKG QTC CALCULATION (BAZETT): 484 MS
EKG R AXIS: -53 DEGREES
EKG R AXIS: -53 DEGREES
EKG T AXIS: 157 DEGREES
EKG T AXIS: 175 DEGREES
EKG VENTRICULAR RATE: 64 BPM
EKG VENTRICULAR RATE: 69 BPM

## 2025-05-22 RX ORDER — BUMETANIDE 2 MG/1
TABLET ORAL
Qty: 60 TABLET | Refills: 2 | Status: SHIPPED | OUTPATIENT
Start: 2025-05-22

## 2025-05-22 ASSESSMENT — ENCOUNTER SYMPTOMS
VOMITING: 0
NAUSEA: 0
SHORTNESS OF BREATH: 1
DIARRHEA: 0
SORE THROAT: 0
SINUS PRESSURE: 0
RHINORRHEA: 0
CONSTIPATION: 0
WHEEZING: 0
COUGH: 0
EYE DISCHARGE: 0
TROUBLE SWALLOWING: 0
EYE REDNESS: 0
ABDOMINAL PAIN: 0

## 2025-05-22 NOTE — PROGRESS NOTES
Tobacco comments:     Tobacco has nothing to do with rheumatic heart disease   Substance Use Topics    Alcohol use: Yes     Comment: rarely        There were no vitals filed for this visit.  Estimated body mass index is 21.03 kg/m² as calculated from the following:    Height as of 1/10/25: 1.575 m (5' 2\").    Weight as of 1/10/25: 52.2 kg (115 lb).    Physical Exam  Vitals and nursing note reviewed.   Constitutional:       General: She is not in acute distress.     Appearance: Normal appearance. She is well-developed. She is not diaphoretic.   HENT:      Head: Normocephalic and atraumatic.      Right Ear: Tympanic membrane, ear canal and external ear normal.      Left Ear: Tympanic membrane, ear canal and external ear normal.      Nose: Nose normal.      Mouth/Throat:      Mouth: Mucous membranes are moist.      Pharynx: Oropharynx is clear. No oropharyngeal exudate.   Eyes:      General:         Right eye: No discharge.         Left eye: No discharge.      Conjunctiva/sclera: Conjunctivae normal.      Pupils: Pupils are equal, round, and reactive to light.   Neck:      Thyroid: No thyromegaly.   Cardiovascular:      Rate and Rhythm: Normal rate and regular rhythm.      Heart sounds: Murmur heard.   Pulmonary:      Effort: Pulmonary effort is normal.      Breath sounds: Normal breath sounds. No wheezing or rales.   Abdominal:      General: Bowel sounds are normal. There is no distension.      Palpations: Abdomen is soft.      Tenderness: There is no abdominal tenderness.   Musculoskeletal:      Cervical back: Normal range of motion and neck supple.      Right lower leg: Edema present.      Left lower leg: Edema present.      Comments: 1-2+ pitting edema to the bilateral lower extremities with negative Homans sign bilaterally.   Lymphadenopathy:      Cervical: No cervical adenopathy.   Skin:     General: Skin is warm and dry.      Findings: No rash.   Neurological:      Mental Status: She is alert and oriented to

## 2025-05-29 DIAGNOSIS — F41.9 ANXIETY: ICD-10-CM

## 2025-05-29 RX ORDER — ALPRAZOLAM 0.25 MG
TABLET ORAL
Qty: 90 TABLET | Refills: 0 | Status: SHIPPED | OUTPATIENT
Start: 2025-05-29 | End: 2025-06-28

## 2025-05-29 NOTE — TELEPHONE ENCOUNTER
Sheryl called requesting a refill of the below medication which has been pended for you: last filled 3/21/2025 #90    Requested Prescriptions     Pending Prescriptions Disp Refills    ALPRAZolam (XANAX) 0.25 MG tablet [Pharmacy Med Name: ALPRAZolam Oral Tablet 0.25 MG] 90 tablet 0     Sig: TAKE 1 TABLET BY MOUTH 3 TIMES A DAY AS NEEDED FOR ANXIETY       Last Appointment Date: 5/22/2025  Next Appointment Date: 6/25/2025    Allergies   Allergen Reactions    Amiodarone Shortness Of Breath     Authoring Clinician or Source System: Dharmesh Pelayo  Respiratory difficulty    Amoxicillin Anaphylaxis    Beta Adrenergic Blockers Shortness Of Breath    Metoprolol Shortness Of Breath     \"Beta Blockers    Penicillin V Potassium Anaphylaxis    Lisinopril Other (See Comments)     Eyes were beating along with her heart beat/HTN    Dronedarone Other (See Comments)     Irregular Heart Beats  Authoring Clinician or Source System: Kyara Schmitt  ([employer])  Edema and irregular heartbeats    Epinephrine Other (See Comments)     dental admin gave her afib      Statins     Morphine Nausea Only, Nausea And Vomiting and Other (See Comments)     Nausea  Other reaction(s): GI Upset  Nausea    Nexium [Esomeprazole Magnesium] Anxiety     Heart flutters more than normal.    Proton Pump Inhibitors Nausea And Vomiting and Anxiety     Other reaction(s): GI Distress, GI Upset  Heart flutters more than normal.

## 2025-05-30 ENCOUNTER — HOSPITAL ENCOUNTER (OUTPATIENT)
Dept: NUCLEAR MEDICINE | Age: 75
End: 2025-05-30
Payer: MEDICARE

## 2025-05-30 ENCOUNTER — HOSPITAL ENCOUNTER (OUTPATIENT)
Dept: NUCLEAR MEDICINE | Age: 75
Discharge: HOME | End: 2025-05-30
Payer: MEDICARE

## 2025-05-30 DIAGNOSIS — R91.1 PULMONARY NODULE 1 CM OR GREATER IN DIAMETER: ICD-10-CM

## 2025-05-30 LAB — GLUCOSE BLD-MCNC: 116 MG/DL (ref 65–105)

## 2025-05-30 PROCEDURE — 82947 ASSAY GLUCOSE BLOOD QUANT: CPT

## 2025-05-30 PROCEDURE — A9609 HC RX DIAGNOSTIC RADIOPHARMACEUTICAL: HCPCS | Performed by: FAMILY MEDICINE

## 2025-05-30 PROCEDURE — 78815 PET IMAGE W/CT SKULL-THIGH: CPT

## 2025-05-30 PROCEDURE — 2500000003 HC RX 250 WO HCPCS: Performed by: FAMILY MEDICINE

## 2025-05-30 PROCEDURE — 3430000000 HC RX DIAGNOSTIC RADIOPHARMACEUTICAL: Performed by: FAMILY MEDICINE

## 2025-05-30 RX ORDER — SODIUM CHLORIDE 0.9 % (FLUSH) 0.9 %
10 SYRINGE (ML) INJECTION PRN
Status: ACTIVE | OUTPATIENT
Start: 2025-05-30

## 2025-05-30 RX ORDER — FLUDEOXYGLUCOSE F 18 200 MCI/ML
10 INJECTION, SOLUTION INTRAVENOUS
Status: COMPLETED | OUTPATIENT
Start: 2025-05-30 | End: 2025-05-30

## 2025-05-30 RX ADMIN — FLUDEOXYGLUCOSE F 18 9.26 MILLICURIE: 200 INJECTION, SOLUTION INTRAVENOUS at 14:25

## 2025-05-30 RX ADMIN — SODIUM CHLORIDE, PRESERVATIVE FREE 10 ML: 5 INJECTION INTRAVENOUS at 14:25

## 2025-06-03 ENCOUNTER — RESULTS FOLLOW-UP (OUTPATIENT)
Dept: FAMILY MEDICINE CLINIC | Age: 75
End: 2025-06-03

## 2025-06-03 DIAGNOSIS — R91.1 PULMONARY NODULE: Primary | ICD-10-CM

## 2025-06-03 DIAGNOSIS — R91.1 LUNG NODULE SEEN ON IMAGING STUDY: Primary | ICD-10-CM

## 2025-06-12 NOTE — TELEPHONE ENCOUNTER
From: Bartolo Frances  To: Yaw Womack DO  Sent: 1/25/2021 1:15 PM EST  Subject: Non-Urgent Berenice Varela,   I had an appt with an orthopedic NP today for my spine issues. I am having pain in between my rib cage usually after I eat. I started getting it after surgeon repaired T10 & T12. She doesn't think it has anything to do with that and suggested I contact you for blood tests to see if it could be spleen, pancreas, liver, whatever else is in that area. I am getting/have kyphosis and it just seems funny I started getting this pain after the kyphoplasty, but she said it wouldn't hurt to get it checked out.   Thank you,  Frankey Countess
2

## 2025-06-25 ENCOUNTER — OFFICE VISIT (OUTPATIENT)
Dept: FAMILY MEDICINE CLINIC | Age: 75
End: 2025-06-25

## 2025-06-25 VITALS
BODY MASS INDEX: 18.95 KG/M2 | TEMPERATURE: 97.4 F | OXYGEN SATURATION: 96 % | RESPIRATION RATE: 16 BRPM | WEIGHT: 103 LBS | HEIGHT: 62 IN | SYSTOLIC BLOOD PRESSURE: 126 MMHG | HEART RATE: 58 BPM | DIASTOLIC BLOOD PRESSURE: 64 MMHG

## 2025-06-25 DIAGNOSIS — F41.9 ANXIETY: ICD-10-CM

## 2025-06-25 DIAGNOSIS — R73.01 IMPAIRED FASTING GLUCOSE: ICD-10-CM

## 2025-06-25 DIAGNOSIS — E87.1 HYPONATREMIA: ICD-10-CM

## 2025-06-25 DIAGNOSIS — E83.42 HYPOMAGNESEMIA: ICD-10-CM

## 2025-06-25 DIAGNOSIS — E78.2 MIXED HYPERLIPIDEMIA: ICD-10-CM

## 2025-06-25 DIAGNOSIS — I10 PRIMARY HYPERTENSION: ICD-10-CM

## 2025-06-25 DIAGNOSIS — R91.1 PULMONARY NODULE 1 CM OR GREATER IN DIAMETER: Primary | ICD-10-CM

## 2025-06-25 DIAGNOSIS — E55.9 VITAMIN D DEFICIENCY: ICD-10-CM

## 2025-06-25 DIAGNOSIS — E87.6 HYPOKALEMIA: ICD-10-CM

## 2025-06-25 ASSESSMENT — ENCOUNTER SYMPTOMS
TROUBLE SWALLOWING: 0
CONSTIPATION: 0
WHEEZING: 0
SORE THROAT: 0
SINUS PRESSURE: 0
ABDOMINAL PAIN: 0
EYE DISCHARGE: 0
EYE REDNESS: 0
VOMITING: 0
RHINORRHEA: 0
DIARRHEA: 0
SHORTNESS OF BREATH: 0
NAUSEA: 0
COUGH: 0

## 2025-06-25 NOTE — PROGRESS NOTES
Patient declines covid, pneumonia, and RSV vaccines. Declines mammogram and cologuard/colonoscopy screening. Declines MAWV.  
blood pressure noted.  - Continue current antihypertensive medications.  - Regular follow-up to monitor blood pressure.    5. Anxiety.  - Reports increased anxiety due to ongoing workup for pulmonary nodule.  - Anxiety is impacting daily activities.  - Support from family and friends is encouraged.  - Consider counseling or therapy if anxiety persists.    6. History of hyponatremia.  - Sodium levels have improved.  - Recent lab results show sodium levels are within the normal range.  - Continue monitoring sodium levels.  - Adjust treatment as necessary based on future lab results.    7. History of hypokalemia.  - Potassium levels are stable.  - Recent lab results show potassium levels are within the normal range.  - Continue monitoring potassium levels.  - Adjust treatment as necessary based on future lab results.    8. History of hypomagnesemia.  - Magnesium level is 2.1, within the normal range.  - Recent lab results confirm stable magnesium levels.  - Continue monitoring magnesium levels.  - Adjust treatment as necessary based on future lab results.    9. Vitamin D deficiency.  - Started taking vitamin D3 and K2 supplements sporadically.  - Recent lab results indicate continued low vitamin D levels.  - Continue taking vitamin D3 and K2 supplements regularly.  - Recheck vitamin D levels at the next follow-up.    Patient is to return to my office in 6 months duration or sooner if any further problems or symptoms arise.      The patient (or guardian, if applicable) and other individuals in attendance with the patient were advised that Artificial Intelligence will be utilized during this visit to record, process the conversation to generate a clinical note, and support improvement of the AI technology. The patient (or guardian, if applicable) and other individuals in attendance at the appointment consented to the use of AI, including the recording.        (Please note that portions of this note were completed with a

## 2025-07-07 DIAGNOSIS — F41.9 ANXIETY: ICD-10-CM

## 2025-07-07 RX ORDER — ALPRAZOLAM 0.25 MG
TABLET ORAL
Qty: 90 TABLET | Refills: 0 | Status: SHIPPED | OUTPATIENT
Start: 2025-07-07 | End: 2025-08-06

## 2025-08-12 ENCOUNTER — TELEPHONE (OUTPATIENT)
Dept: FAMILY MEDICINE CLINIC | Age: 75
End: 2025-08-12

## 2025-08-12 DIAGNOSIS — E87.1 LOW SODIUM LEVELS: Primary | ICD-10-CM

## 2025-08-12 DIAGNOSIS — I10 PRIMARY HYPERTENSION: ICD-10-CM

## 2025-08-12 DIAGNOSIS — R04.2 COUGHING UP BLOOD: ICD-10-CM

## 2025-08-14 ENCOUNTER — CARE COORDINATION (OUTPATIENT)
Dept: CARE COORDINATION | Age: 75
End: 2025-08-14

## 2025-08-14 DIAGNOSIS — J84.10 LUNG GRANULOMA (HCC): Primary | ICD-10-CM

## 2025-08-14 PROCEDURE — 1111F DSCHRG MED/CURRENT MED MERGE: CPT

## 2025-08-15 ENCOUNTER — RESULTS FOLLOW-UP (OUTPATIENT)
Dept: FAMILY MEDICINE CLINIC | Age: 75
End: 2025-08-15

## 2025-08-15 ENCOUNTER — TELEPHONE (OUTPATIENT)
Dept: FAMILY MEDICINE CLINIC | Age: 75
End: 2025-08-15

## 2025-08-15 ENCOUNTER — HOSPITAL ENCOUNTER (OUTPATIENT)
Dept: LAB | Age: 75
Discharge: HOME | End: 2025-08-15
Payer: MEDICARE

## 2025-08-15 DIAGNOSIS — I10 PRIMARY HYPERTENSION: ICD-10-CM

## 2025-08-15 DIAGNOSIS — R04.2 COUGHING UP BLOOD: ICD-10-CM

## 2025-08-15 DIAGNOSIS — E87.1 LOW SODIUM LEVELS: Primary | ICD-10-CM

## 2025-08-15 DIAGNOSIS — E87.1 LOW SODIUM LEVELS: ICD-10-CM

## 2025-08-15 LAB
ANION GAP SERPL CALCULATED.3IONS-SCNC: 11 MMOL/L (ref 9–16)
BASOPHILS # BLD: 0.04 K/UL (ref 0–0.2)
BASOPHILS NFR BLD: 0 % (ref 0–2)
BUN SERPL-MCNC: 10 MG/DL (ref 8–23)
BUN/CREAT SERPL: 17 (ref 9–20)
CALCIUM SERPL-MCNC: 8.8 MG/DL (ref 8.6–10.4)
CHLORIDE SERPL-SCNC: 89 MMOL/L (ref 98–107)
CO2 SERPL-SCNC: 29 MMOL/L (ref 20–31)
CREAT SERPL-MCNC: 0.6 MG/DL (ref 0.6–0.9)
EOSINOPHIL # BLD: 0.04 K/UL (ref 0–0.44)
EOSINOPHILS RELATIVE PERCENT: 0 % (ref 1–4)
ERYTHROCYTE [DISTWIDTH] IN BLOOD BY AUTOMATED COUNT: 13 % (ref 11.8–14.4)
GFR, ESTIMATED: >90 ML/MIN/1.73M2
GLUCOSE SERPL-MCNC: 106 MG/DL (ref 74–99)
HCT VFR BLD AUTO: 36.5 % (ref 36.3–47.1)
HGB BLD-MCNC: 12.6 G/DL (ref 11.9–15.1)
IMM GRANULOCYTES # BLD AUTO: 0.08 K/UL (ref 0–0.3)
IMM GRANULOCYTES NFR BLD: 1 %
INR PPP: 1
LYMPHOCYTES NFR BLD: 1.13 K/UL (ref 1.1–3.7)
LYMPHOCYTES RELATIVE PERCENT: 12 % (ref 24–43)
MCH RBC QN AUTO: 31.4 PG (ref 25.2–33.5)
MCHC RBC AUTO-ENTMCNC: 34.5 G/DL (ref 25.2–33.5)
MCV RBC AUTO: 91 FL (ref 82.6–102.9)
MONOCYTES NFR BLD: 1.06 K/UL (ref 0.1–1.2)
MONOCYTES NFR BLD: 11 % (ref 3–12)
NEUTROPHILS NFR BLD: 76 % (ref 36–65)
NEUTS SEG NFR BLD: 7.32 K/UL (ref 1.5–8.1)
NRBC BLD-RTO: 0 PER 100 WBC
PLATELET # BLD AUTO: 249 K/UL (ref 138–453)
PMV BLD AUTO: 9.3 FL (ref 8.1–13.5)
POTASSIUM SERPL-SCNC: 3.7 MMOL/L (ref 3.7–5.3)
PROTHROMBIN TIME: 13 SEC (ref 11.5–14.2)
RBC # BLD AUTO: 4.01 M/UL (ref 3.95–5.11)
SODIUM SERPL-SCNC: 129 MMOL/L (ref 136–145)
WBC OTHER # BLD: 9.7 K/UL (ref 3.5–11.3)

## 2025-08-15 PROCEDURE — 80048 BASIC METABOLIC PNL TOTAL CA: CPT

## 2025-08-15 PROCEDURE — 85610 PROTHROMBIN TIME: CPT

## 2025-08-15 PROCEDURE — 85025 COMPLETE CBC W/AUTO DIFF WBC: CPT

## 2025-08-15 PROCEDURE — 36415 COLL VENOUS BLD VENIPUNCTURE: CPT

## 2025-08-15 RX ORDER — SODIUM CHLORIDE 1 G/1
1 TABLET ORAL DAILY
Qty: 30 TABLET | Refills: 0 | Status: SHIPPED | OUTPATIENT
Start: 2025-08-15

## 2025-08-18 ENCOUNTER — PATIENT MESSAGE (OUTPATIENT)
Dept: FAMILY MEDICINE CLINIC | Age: 75
End: 2025-08-18

## 2025-08-22 ENCOUNTER — HOSPITAL ENCOUNTER (OUTPATIENT)
Dept: LAB | Age: 75
Discharge: HOME | End: 2025-08-22
Payer: MEDICARE

## 2025-08-22 ENCOUNTER — OFFICE VISIT (OUTPATIENT)
Dept: FAMILY MEDICINE CLINIC | Age: 75
End: 2025-08-22

## 2025-08-22 VITALS
BODY MASS INDEX: 18.88 KG/M2 | RESPIRATION RATE: 16 BRPM | SYSTOLIC BLOOD PRESSURE: 154 MMHG | HEIGHT: 62 IN | OXYGEN SATURATION: 96 % | TEMPERATURE: 97.4 F | WEIGHT: 102.6 LBS | HEART RATE: 72 BPM | DIASTOLIC BLOOD PRESSURE: 80 MMHG

## 2025-08-22 DIAGNOSIS — E87.1 HYPONATREMIA: ICD-10-CM

## 2025-08-22 DIAGNOSIS — Z09 HOSPITAL DISCHARGE FOLLOW-UP: ICD-10-CM

## 2025-08-22 DIAGNOSIS — J95.811 POSTPROCEDURAL PNEUMOTHORAX: ICD-10-CM

## 2025-08-22 DIAGNOSIS — C34.92 ADENOCARCINOMA OF LEFT LUNG (HCC): Primary | ICD-10-CM

## 2025-08-22 DIAGNOSIS — J96.01 ACUTE HYPOXIC RESPIRATORY FAILURE (HCC): ICD-10-CM

## 2025-08-22 DIAGNOSIS — R04.2 HEMOPTYSIS: ICD-10-CM

## 2025-08-22 DIAGNOSIS — E87.1 LOW SODIUM LEVELS: ICD-10-CM

## 2025-08-22 LAB
ANION GAP SERPL CALCULATED.3IONS-SCNC: 11 MMOL/L (ref 9–16)
BUN SERPL-MCNC: 10 MG/DL (ref 8–23)
BUN/CREAT SERPL: 20 (ref 9–20)
CALCIUM SERPL-MCNC: 9.1 MG/DL (ref 8.6–10.4)
CHLORIDE SERPL-SCNC: 94 MMOL/L (ref 98–107)
CO2 SERPL-SCNC: 28 MMOL/L (ref 20–31)
CREAT SERPL-MCNC: 0.5 MG/DL (ref 0.6–0.9)
GFR, ESTIMATED: >90 ML/MIN/1.73M2
GLUCOSE SERPL-MCNC: 105 MG/DL (ref 74–99)
POTASSIUM SERPL-SCNC: 4.9 MMOL/L (ref 3.7–5.3)
SODIUM SERPL-SCNC: 133 MMOL/L (ref 136–145)

## 2025-08-22 PROCEDURE — 80048 BASIC METABOLIC PNL TOTAL CA: CPT

## 2025-08-22 PROCEDURE — 36415 COLL VENOUS BLD VENIPUNCTURE: CPT

## 2025-08-22 RX ORDER — DOXYCYCLINE 100 MG/1
CAPSULE ORAL
COMMUNITY
Start: 2025-08-13

## 2025-08-26 ENCOUNTER — CARE COORDINATION (OUTPATIENT)
Dept: CASE MANAGEMENT | Age: 75
End: 2025-08-26

## 2025-08-27 ASSESSMENT — ENCOUNTER SYMPTOMS
RHINORRHEA: 0
VOMITING: 0
COUGH: 0
DIARRHEA: 0
EYE REDNESS: 0
SORE THROAT: 0
WHEEZING: 0
TROUBLE SWALLOWING: 0
NAUSEA: 0
CONSTIPATION: 0
ABDOMINAL PAIN: 0
BACK PAIN: 1
EYE DISCHARGE: 0
SHORTNESS OF BREATH: 0
SINUS PRESSURE: 0

## 2025-08-31 DIAGNOSIS — F41.9 ANXIETY: ICD-10-CM

## 2025-09-02 ENCOUNTER — E-VISIT (OUTPATIENT)
Dept: FAMILY MEDICINE CLINIC | Age: 75
End: 2025-09-02
Payer: MEDICARE

## 2025-09-02 DIAGNOSIS — T14.8XXA WOUND INFECTION: Primary | ICD-10-CM

## 2025-09-02 DIAGNOSIS — L08.9 WOUND INFECTION: Primary | ICD-10-CM

## 2025-09-02 PROCEDURE — 99422 OL DIG E/M SVC 11-20 MIN: CPT | Performed by: FAMILY MEDICINE

## 2025-09-02 RX ORDER — CLINDAMYCIN HYDROCHLORIDE 300 MG/1
CAPSULE ORAL
Qty: 8 CAPSULE | Refills: 0 | Status: SHIPPED | OUTPATIENT
Start: 2025-09-02

## 2025-09-02 RX ORDER — CLINDAMYCIN HYDROCHLORIDE 300 MG/1
300 CAPSULE ORAL 3 TIMES DAILY
Qty: 21 CAPSULE | Refills: 0 | Status: SHIPPED | OUTPATIENT
Start: 2025-09-02 | End: 2025-09-09

## 2025-09-02 RX ORDER — ALPRAZOLAM 0.25 MG
TABLET ORAL
Qty: 90 TABLET | Refills: 0 | Status: SHIPPED | OUTPATIENT
Start: 2025-09-02 | End: 2025-10-02

## 2025-09-03 ENCOUNTER — OFFICE VISIT (OUTPATIENT)
Dept: PRIMARY CARE CLINIC | Age: 75
End: 2025-09-03
Payer: MEDICARE

## 2025-09-03 VITALS
DIASTOLIC BLOOD PRESSURE: 70 MMHG | WEIGHT: 102 LBS | OXYGEN SATURATION: 94 % | SYSTOLIC BLOOD PRESSURE: 140 MMHG | BODY MASS INDEX: 18.66 KG/M2 | HEART RATE: 79 BPM

## 2025-09-03 DIAGNOSIS — S81.801A OPEN WOUND OF RIGHT LOWER LEG, INITIAL ENCOUNTER: Primary | ICD-10-CM

## 2025-09-03 PROCEDURE — 99212 OFFICE O/P EST SF 10 MIN: CPT | Performed by: FAMILY MEDICINE

## 2025-09-03 ASSESSMENT — ENCOUNTER SYMPTOMS: COLOR CHANGE: 0

## 2025-09-04 ENCOUNTER — CARE COORDINATION (OUTPATIENT)
Dept: CARE COORDINATION | Age: 75
End: 2025-09-04